# Patient Record
Sex: MALE | Race: WHITE | Employment: OTHER | URBAN - METROPOLITAN AREA
[De-identification: names, ages, dates, MRNs, and addresses within clinical notes are randomized per-mention and may not be internally consistent; named-entity substitution may affect disease eponyms.]

---

## 2019-02-04 ENCOUNTER — HOSPITAL ENCOUNTER (INPATIENT)
Age: 74
LOS: 9 days | Discharge: HOME | DRG: 057 | End: 2019-02-13
Attending: PHYSICAL MEDICINE & REHABILITATION | Admitting: PHYSICAL MEDICINE & REHABILITATION
Payer: MEDICARE

## 2019-02-04 DIAGNOSIS — R26.9 GAIT DIFFICULTY: ICD-10-CM

## 2019-02-04 DIAGNOSIS — R44.9 SENSORY DEFICIT, LEFT: ICD-10-CM

## 2019-02-04 DIAGNOSIS — G81.94 LEFT HEMIPARESIS (HCC): ICD-10-CM

## 2019-02-04 DIAGNOSIS — I63.9 CEREBROVASCULAR ACCIDENT (CVA), UNSPECIFIED MECHANISM (HCC): ICD-10-CM

## 2019-02-04 PROCEDURE — 97161 PT EVAL LOW COMPLEX 20 MIN: CPT

## 2019-02-04 PROCEDURE — 74011250637 HC RX REV CODE- 250/637: Performed by: PHYSICAL MEDICINE & REHABILITATION

## 2019-02-04 PROCEDURE — 99223 1ST HOSP IP/OBS HIGH 75: CPT | Performed by: PHYSICAL MEDICINE & REHABILITATION

## 2019-02-04 PROCEDURE — 97116 GAIT TRAINING THERAPY: CPT

## 2019-02-04 PROCEDURE — 97530 THERAPEUTIC ACTIVITIES: CPT

## 2019-02-04 PROCEDURE — 65310000000 HC RM PRIVATE REHAB

## 2019-02-04 RX ORDER — AMLODIPINE BESYLATE 5 MG/1
5 TABLET ORAL DAILY
Status: DISCONTINUED | OUTPATIENT
Start: 2019-02-05 | End: 2019-02-13 | Stop reason: HOSPADM

## 2019-02-04 RX ORDER — PREDNISONE 5 MG/1
5 TABLET ORAL DAILY
COMMUNITY
End: 2019-02-13

## 2019-02-04 RX ORDER — ASPIRIN 81 MG/1
81 TABLET ORAL DAILY
Status: DISCONTINUED | OUTPATIENT
Start: 2019-02-05 | End: 2019-02-13 | Stop reason: HOSPADM

## 2019-02-04 RX ORDER — ATORVASTATIN CALCIUM 40 MG/1
80 TABLET, FILM COATED ORAL
Status: DISCONTINUED | OUTPATIENT
Start: 2019-02-04 | End: 2019-02-13 | Stop reason: HOSPADM

## 2019-02-04 RX ORDER — IBUPROFEN 200 MG
1 TABLET ORAL DAILY
Status: DISCONTINUED | OUTPATIENT
Start: 2019-02-05 | End: 2019-02-13 | Stop reason: HOSPADM

## 2019-02-04 RX ADMIN — ATORVASTATIN CALCIUM 80 MG: 40 TABLET, FILM COATED ORAL at 21:19

## 2019-02-04 NOTE — PROGRESS NOTES
02/04/19 1315 Dual Skin Pressure Injury Assessment Dual Skin Pressure Injury Assessment WDL Second Care Provider (Based on [address] [place] [address]) [place], RN Skin Integumentary Skin Integumentary (WDL) WDL Primary Nurse [address] [place] [address], RN performed a dual skin assessment on this patient No impairment noted. Oriented to room and call light. Instructed to call for assistance. No needs stated at this time. Will continue to monitor. Eduardo score is 18.

## 2019-02-04 NOTE — PROGRESS NOTES
Hourly rounds completed this shift. Patient resting in wheelchair with wife. No needs stated at this time. Will continue to monitor and give report to oncoming RN.

## 2019-02-04 NOTE — PROGRESS NOTES
PHYSICAL THERAPY EXAMINATION 
TIME IN 32 61 16 TIME OUT 1620 Patient Name: Subhash Vasquez Patient Age: 68 y.o. Past Medical History: No past medical history on file. Medical Diagnosis:  CVA (cerebral vascular accident) ([facility] [address] [I63.9] Left hemiparesis ([facility] [address] [G81.94] Sensory deficit, left [R41.89] Gait difficulty [R26.9] <principal problem not specified> Precautions at Admission: Other (comment)(fall precautions) Therapy Diagnosis:  
Difficulty with bed mobility  [x] Difficulty with functional transfers  [x] Difficulty with ambulation  [x] Difficulty with stair negotiations  [x] Problem List:   
Decreased strength B LE  [x] Decreased strength trunk/core  [x] Decreased AROM   [x] Decreased PROM  [] Decreased endurance  [x] Decreased balance sitting  [] Decreased balance standing  [x] Pain   [] Slow ambulation velocity  [x] Decreased coordination  [x] Decreased safety awareness  [] Functional Limitations:  
Decreased independence with bed mobility  [x] Decreased independence with functional transfers  [x] Decreased independence with ambulation  [x] Decreased independence with stair negotiation  [x] Previous Functional Level: Patient reporting he was functionally independent prior to CVA. Independent with ADLs and ambulation without a device inside and outside home. Driving Home Environment: Home Environment: Private residence # Steps to Enter: 1 Rails to Enter: No 
Wheelchair Ramp: No 
One/Two Story Residence: One story Living Alone: No 
Support Systems: Spouse/Significant Other/Partner Patient Expects to be Discharged to[de-identified] Private residence Current DME Used/Available at Home: None Tub or Shower Type: Shower(shower door, no grab bars) Outcome Measures: Vital Signs: 
Patient Vitals for the past 12 hrs: 
 Temp Pulse Resp BP SpO2  
02/04/19 1522 98.2 °F (36.8 °C) 89 16 126/80 100 % 19 1315 98.7 °F (37.1 °C) 79 17 121/73 99 % Pain level:No c/o pain during treatment Pain location:NA Pain interventions:NA Patient education:PT POC and goals, Bed mobility training,transfer training, gait training, fall precautions, activity pacing, body mechanics, w/c mobility and parts management, Patient verbalizing understanding and demonstrating partial understanding of patient education. Recommend follow up education. Interdisciplinary Communication:spoke with RN regarding functional status Cognition: Orientation:A+O x 4 Communication:able to express needs verbally, able to follow multi step commands Social Interaction:cooperating, appropriate with PT, participating, motivated to improve Problem Solving:difficulty with managing body mechanics during functional mobility secondary to left hemiparesis Memory:able to recall name, , address, PMH 
 
 
 MMT Initial Asssessment Right Lower Extremity Left Lower Extremity Hip Flexion 5 4 Knee Extension 5 5 Knee Flexion 5 4 Ankle Dorsiflexion 5 4-  
0/5 No palpable muscle contraction 1/5 Palpable muscle contraction, no joint movement 2-/5 Less than full range of motion in gravity eliminated position 2/5 Able to complete full range of motion in gravity eliminated position 2+/5 Able to initiate movement against gravity 3-/5 More than half but not full range of motion against gravity 3/5 Able to complete full range of motion against gravity 3+/5 Completes full range of motion against gravity with minimal resistance 4-/5 Completes full range of motion against gravity with minimal-moderate resistance 4/5 Completes full range of motion against gravity with moderate resistance 4+/5 Completes full range of motion against gravity with moderate-maximum resistance 5/5 Completes full range of motion against gravity with maximum resistance  AROM: RLE WFL, LLE decreased 10% with impaired coordination, flexor synergy with hyperreflexi FIM SCORES Initial Assessment Bed/Chair/Wheelchair Transfers 4 Wheelchair Mobility 3 Walking [place] 3 Steps/Stairs 2 PRIMARY MODE OF LOCOMOTION: ambulation Please see IRC Interdisciplinary Eval: Coordination/Balance Section for details regarding FIM score description. BED/CHAIR/WHEELCHAIR TRANSFERS Initial Assessment Rolling Right 6 (Modified independent) Rolling Left 6 (Modified independent) Supine to Sit 5 (Supervision) Sit to Stand Contact guard assistance Sit to Supine 6 (Modified independent) Transfer Assist Score 4 Transfer Type SPT without device Comments Increased time and effort to complete bed mobility and transfers with tendency to neglect left UE during both. Decreased coordination of LLE during transfers and bed mobility Car Transfer Not tested Car Type WHEELCHAIR MOBILITY/MANAGEMENT Initial Assessment Able to Propel 150 feet(using RUE and RLE) Functional Level 3 Curbs/ramps assistance required 0 (Not tested) Wheelchair set up assistance required 3 (Moderate assistance) Wheelchair management Manages left brake, Manages right brake WALKING INDEPENDENCE Initial Assessment Assistive device Gait belt Ambulation assistance - level surface 3 (Moderate assistance) Distance 150 Feet (ft) Functional Level 3 Comments slow cont ataxic gait with fluctuating width of base of support, occasional flexion synergy noted LLE with marching type step, occasional scissoring of LLE during swing phase and occasional decreased step clearance LLE with loss of balance x 4 and min to mod assist to regain balance Ambulation assistance - unlevel surface 0 (Not tested) STEPS/STAIRS Initial Assessment Steps/Stairs ambulated 4 Rail Use Both Functional Level 2 Comments slow reciprocating pattern going up step with decreased step clearance LLE and excessive hip and knee flexion LLE, single step at a time going down steps with impaired left foot placement on step Curbs/Ramps 0 (Not tested) QUALITY INDICATOR ASSIST COMMENTS Walk 10 feet mod Suing gait belt for all gait training Walk 50 feet with 2 turns mod Walk 150 feet mod Walk 10 feet on uneven  NT To be assessed 1 step/curb min With hand rails 4 steps min With hand rails 12 steps unable Due to left hemiparesis  object min Wheel 50' w/2 turns mod Wheel 150' mod PHYSICAL THERAPY PLAN OF CARE Therapy Diagnosis:   Please see table above Order received from MD for physical therapy services and chart reviewed. Pt to be seen at least 5 times per week for at least 1.5 hours of physical therapy per day for 2 weeks. Thank you for the referral. 
 
LTGs: LTG 1. Patient independent with bed mobility in 5 days STG 2. Patient transfer sit<>stand and perform stand pivot transfer with SBA assist in 1 week LTG 2. Patient transfer sit<>stand and perform stand pivot transfer with modified independence in 2 weeks STG 3. Patient ambulate 200 feet with LRAD and CGA  in 1 week LTG 3. Patient ambulate 400  feet with LRAD and supervision assist in 2 weeks STG 4. Patient ambulate up/down 4 steps with hand rails and SBA in 1 week LTG 4. Patient ambulate up/down 12 steps with hand rails and supervision assist in 2 weeks STG 5. Patient ambulate up/down 6 inch step with LRAD and min assist in 1 week LTG 5. Patient ambulate up/down 6 inch step with LRAD and SBA in 2 weeks Pt would benefit from skilled physical therapy in order to improve independent functional mobility within the home. Interventions may include range of motion (AROM, PROM B LE/trunk), motor function (B LE/trunk strengthening/coordination), activity tolerance (vitals, oxygen saturation levels), bed mobility training, balance activities, gait training (progressive ambulation program), and functional transfer training. Please see IRC; Interdisciplinary Eval, Care Plan, and Patient Education for further information regarding physical therapy examination and plan of care. Patient returned to room at end of treatment and remained up in recliner with LEs elevated and with needs in reach. Iliana Abraham, PT 
2/4/2019

## 2019-02-04 NOTE — H&P
HISTORY AND PHYSICAL C Admit Date: 2/4/2019 Primary Care Physician: Unknown, Provider Specialty Group: PMR Chief Complaint : Gait dysfunction secondary to below. Admit Diagnosis: CVA (cerebral vascular accident) ([facility] [address] [I63.9]; Left hemiparesis ([facility] [address] [G81.94]; Sensory deficit, left [R41.89]; Gait difficulty [R26.9] CVA 
left  hemiparesis Weakness Hypertension 
hypercholesterolemia Pain DVT risk Acute Rehab Dx: Left hemiparesis 
left steve sensory deficit 
ataxia Gait dysfunction Mobility and ambulation deficits Self Care/ADL deficits Date of Evaluation:  February 4, 2019 HPI: Edna Botello is a 68 y.o. male patient at [address] [place] [address] Road who was admitted on 2/4/2019  by Saida Guillermo MD with below mentioned medical history ,is being seen for Physical Medicine and Rehabilitation. Patient presented to [facility] January 28, 2019 with acute onset left-sided weakness difficulty standing. You also complained of mild numbness to his left side. He had no head injury fever and chills shortness of breath chest pain dysuria. He did not have any vision changes alterations in speech, Voice, or swallowing. Patient has history of hypertension hypercholesterolemia, Wegeners granulomatosis. Head CT was negative for acute hemorrhage. Tell her neurologist recommended TPA and was given in the ER. Patient was admitted And manage for acute CVA and secondary prevention. MRI confirmed a small acute early subacute infarction in the right thalamus. CTA showed moderate to significant stenosis noted in the right internal carotid siphon. Diseases also noted in the V segment of the right vertebral artery with occlusive to near occlusive narrowing. Physical therapy was initiated there and patient was starting to mobilize. However, Patient shows significant functional deficits due to left sided weakness and sensory deficit.   
Our service was consulted for rehab needs and we recommended inpatient hospital rehabilitation is reasonable and necessary due to ongoing acute medical issues which have not changed since initial pre-admission evaluation. and rehab needs still likely best managed in IRU setting. The patient was evaluated by [facility] admissions coordinators. I reviewed the preadmission screening and have approved for admission to the [facility]. The patient was cleared for transfer to rehab today. Patient continues to have ongoing  medical issues outlined above requiring physician medical supervision and functional deficits which would benefit from continued intensive therapies. Current Level of Function: 
Patient has mild left sided weakness, but more signifcantly poor control shania the left side. This is major barreir that needs to be addresses per therapy.  
  (evaluated by acute therapy staff, with bed mobility, transfers, balance personally observed post-admission in the IRF setting minutes prior to submission of document) bathing - mod A, lower body dressing - max A, toileting - max A, bed mobility - CGA, transfers- min A, poor balance , ambulation- short distances with RW , mod assist. Patient has slow cont ataxic gait with fluctuating width of base of support, occasional flexion synergy noted LLE with marching type step, occasional scissoring of LLE during swing phase and occasional decreased step clearance LLE with loss of balance x 4 and min to mod assist to regain balance. Prior Level of Function/Home Situation:  
 , lives with spouse in a two story home with 1 step to enter. atient normally ambulated with cane at modified independent level, without any other debilities. Allergies Allergen Reactions  Penicillins Not Reported This Time No family history on file. Social History Tobacco Use  Smoking status: Not on file Substance Use Topics  Alcohol use: Not on file Current Facility-Administered Medications Medication Dose Route Frequency  [START ON 2/5/2019] amLODIPine (NORVASC) tablet 5 mg  5 mg Oral DAILY  [START ON 2/5/2019] aspirin delayed-release tablet 81 mg  81 mg Oral DAILY  atorvastatin (LIPITOR) tablet 80 mg  80 mg Oral QHS  [START ON 2/5/2019] nicotine (NICODERM CQ) 21 mg/24 hr patch 1 Patch  1 Patch TransDERmal DAILY Review of Systems: 
Denies: fevers, chills, sweats, fatigue, malaise, anorexia, weight loss Denies: blurry vision, loss of vision, eye pain, photophobia Denies: hearing loss, ringing in the ears, earache, epistaxis Denies: chest pain, palpitations, syncope, orthopnea, paroxysmal nocturnal dyspnea, claudication Denies: dysphagia, odynophagia, nausea, vomiting, diarrhea, constipation, abdominal pain, jaundice, melena Denies: frequency, dysuria, nocturia, urinary incontinence, stones, hematuria Denies: polydipsia/polyuria, skin changes, temperature intolerance, unexpected weight gain Denies: back pain, joint pain, joint swelling, muscle pain Denies: bleeding problems, blood transfusions, bruising, pallor, swollen lymph nodes Denies: headache, dysarthria, blurred vision, diplopia,seizure Objective:  
 
Visit Vitals /73 Pulse 79 Temp 98.7 °F (37.1 °C) Resp 17 SpO2 99% Intake and Output: 
No intake/output data recorded. Physical Exam: Psych: Patient was oriented to person, place, and time. Without hallucinations, abnormal affect or abnormal behaviors during the examination. Patient is not suicidal.  
General:   Alert, appears stated age, No acute distress. HEENT:  Normocephalic, EOMI, facial symmetry  Intact. Oral mucosa pink and moist. No nasal discharge. Lungs:  CTA Bilaterally,Respiration even and unlabored No apparent use of accessory muscles for respiration. No nasal alar flaring. Heart:  Regular rate and rhythm, S1, S2, No obvious murmur, click, rub or gallop. Genitourinary: defered Abdomen:  Soft, non-tender to palpation in all four quadrants. Bowel sounds present. No obvious masses palpated. Neuromuscular:  PERRL, EOMI 
LUE     Shoulder abduction   4/5 Elbow flexion:  4 /5 Wrist extension:  4 /5 Finger flexion;  4 /5 
 ADMQ:   4/5 RUE    Shoulder abduction:  5/5 Elbow flexion:  5 /5 Wrist extension: 5 /5 Finger flexion:   5/5 
 ADMQ:  5 /5 LLE     Hip flexion:   4/5 Knee extension:  4 /5 Ankle dorsiflexion: 4  /5 Ankle plantarflexion:  4 /5       
RLE     Hip flexion:  5 /5 Knee extension:   5/5 Ankle dorsiflexion: 5  /5 Ankle plantarflexion:  5 /5 Sensory - decreased sensation to soft touch, pin prock, proprioception Left side. LUE>LLE limb ataxia. Skin:  Intact, dry, good skin turgor, age related changes present Edema: none Lab Review:  No results found for this or any previous visit (from the past 72 hour(s)). Condition on Admission: Good Assessment: The Post Assessment Physician Evaluation (SOHAN) found the current functional status to be comparable with the Pre-admission Screening. The Patient is a good candidate for acute inpatient rehabilitation. Nothing since the Pre-admission screen has changed that determination. Rehabilitation Plan The patient has shown the ability to tolerate and benefit from 3 hours of therapy daily and is being admitted to a comprehensive acute inpatient rehabilitation program consisting of at least 3 hours of combined physical and occupational therapies. Begin intensive Physical Therapy for a minimum of 1.5 hours a day, at least 5 out of 7 days per week to address bed mobility, transfers, ambulation, strengthening, balance, and endurance. Begin intensive Occupational Therapy for a minimum of 1.5 hours a day, at least 5 out of 7 days per week to address ADL ( bathing, LE dressing, toileting) and adaptive equipment as needed. - focus on left sided weakness, control deficit. The patient will also require 24-hour skilled rehabilitation nursing for bowel and bladder management, skin care for decubitus ulcer prevention , pain management and ongoing medication administration The patient may benefit from a psychology consult for depression, adjustment disorder. Continue ST for: dysarthria, impaired communication skills. Given the patient's complex neurologic/medical condition and the risk of further medical complications including: DVT, PE, skin breakdown, pneumonia due to decreased mobility, recurrent CVA, progression of CVA. MI, cardiac arrhythmias due to HTN, increased risk of thromboembolism could potentially impact the IRF program . For these ongoing medical issues, rehabilitation services could not be safely provided at a lower level of care such as a skilled nursing facility or nursing home. Patient has shown the ability to tolerate and benefit from 3 hours of therapy daily and is being admitted to a comprehensive acute inpatient rehabilitation program consisting of at least 3 hours of combined physical and occupational therapies. planning intensive Physical Therapy for a minimum of 1.5 hours a day, at least 5 out of 7 days per week to address bed mobility, transfers, ambulation, strengthening, balance, and endurance. Planning intensive Occupational Therapy for a minimum of 1.5 hours a day, at least 5 out of 7 days per week to address ADL ( bathing, LE dressing, toileting) and adaptive equipment as needed. 
  
Patient was independent at baseline with mobility and ADLs.   
Expect patient will improve to mod I level with mobility and gairt as pain and RLE strength improve. [id number] length of stay and patient is expected to return home with wife and continued rehabilitation with home health therapy, out pt PT. Continue daily physician medical management: CVA  - small acute early subacute infarction in the right thalamus. CTA  
- left  Hemiparesis 
- continue secondary prevention management. - aspirin, statin BP  Control, risk factor reduction. Hypertension - BP uncontrolled, fluctuating, managed medically. - norvasc 5 daily Hypercholesterolemia - lipitor 80 Smoker - continue nicoderm. - counceling. Pneumonia prophylaxis- Insentive spirometer every hour while awake DVT risk / DVT Prophylaxis- Will require daily physician exam to assess for signs and symptoms as patient is at increased risk for of thromboembolism. Mobilization as tolerated. Intermittent pneumatic compression devices when in bed Thigh-high or knee-high thromboembolic deterrent hose when out of bed. Potential urinary retention/ neurogenic bladder - schedule voids q6-8 hrs. Check post-void residual every shift; In and Out catheterize if post-void residual is more than 400 cc. 
 
bowel program - monitor.  
  
 
 
Signed By: Guerda Saleh MD   
 February 4, 2019

## 2019-02-05 LAB
ANION GAP SERPL CALC-SCNC: 7 MMOL/L (ref 7–16)
BASOPHILS # BLD: 0.1 K/UL (ref 0–0.2)
BASOPHILS NFR BLD: 1 % (ref 0–2)
BUN SERPL-MCNC: 13 MG/DL (ref 8–23)
CALCIUM SERPL-MCNC: 8.4 MG/DL (ref 8.3–10.4)
CHLORIDE SERPL-SCNC: 109 MMOL/L (ref 98–107)
CO2 SERPL-SCNC: 25 MMOL/L (ref 21–32)
CREAT SERPL-MCNC: 0.63 MG/DL (ref 0.8–1.5)
DIFFERENTIAL METHOD BLD: ABNORMAL
EOSINOPHIL # BLD: 0.3 K/UL (ref 0–0.8)
EOSINOPHIL NFR BLD: 5 % (ref 0.5–7.8)
ERYTHROCYTE [DISTWIDTH] IN BLOOD BY AUTOMATED COUNT: 15.9 % (ref 11.9–14.6)
GLUCOSE SERPL-MCNC: 86 MG/DL (ref 65–100)
HCT VFR BLD AUTO: 40.3 % (ref 41.1–50.3)
HGB BLD-MCNC: 12.4 G/DL (ref 13.6–17.2)
IMM GRANULOCYTES # BLD AUTO: 0 K/UL (ref 0–0.5)
IMM GRANULOCYTES NFR BLD AUTO: 0 % (ref 0–5)
LYMPHOCYTES # BLD: 1.8 K/UL (ref 0.5–4.6)
LYMPHOCYTES NFR BLD: 31 % (ref 13–44)
MAGNESIUM SERPL-MCNC: 2.1 MG/DL (ref 1.8–2.4)
MCH RBC QN AUTO: 26.8 PG (ref 26.1–32.9)
MCHC RBC AUTO-ENTMCNC: 30.8 G/DL (ref 31.4–35)
MCV RBC AUTO: 87 FL (ref 79.6–97.8)
MONOCYTES # BLD: 0.6 K/UL (ref 0.1–1.3)
MONOCYTES NFR BLD: 11 % (ref 4–12)
NEUTS SEG # BLD: 2.9 K/UL (ref 1.7–8.2)
NEUTS SEG NFR BLD: 51 % (ref 43–78)
NRBC # BLD: 0 K/UL (ref 0–0.2)
PLATELET # BLD AUTO: 230 K/UL (ref 150–450)
PMV BLD AUTO: 11.8 FL (ref 9.4–12.3)
POTASSIUM SERPL-SCNC: 3.6 MMOL/L (ref 3.5–5.1)
RBC # BLD AUTO: 4.63 M/UL (ref 4.23–5.6)
SODIUM SERPL-SCNC: 141 MMOL/L (ref 136–145)
WBC # BLD AUTO: 5.7 K/UL (ref 4.3–11.1)

## 2019-02-05 PROCEDURE — 65310000000 HC RM PRIVATE REHAB

## 2019-02-05 PROCEDURE — 97110 THERAPEUTIC EXERCISES: CPT

## 2019-02-05 PROCEDURE — 97530 THERAPEUTIC ACTIVITIES: CPT

## 2019-02-05 PROCEDURE — 97165 OT EVAL LOW COMPLEX 30 MIN: CPT

## 2019-02-05 PROCEDURE — 97116 GAIT TRAINING THERAPY: CPT

## 2019-02-05 PROCEDURE — 83735 ASSAY OF MAGNESIUM: CPT

## 2019-02-05 PROCEDURE — 92523 SPEECH SOUND LANG COMPREHEN: CPT

## 2019-02-05 PROCEDURE — 97112 NEUROMUSCULAR REEDUCATION: CPT

## 2019-02-05 PROCEDURE — 80048 BASIC METABOLIC PNL TOTAL CA: CPT

## 2019-02-05 PROCEDURE — 85025 COMPLETE CBC W/AUTO DIFF WBC: CPT

## 2019-02-05 PROCEDURE — 96150 PR HEAL & BEHAV ASSESS,EA 15 MIN,INIT: CPT | Performed by: PSYCHOLOGIST

## 2019-02-05 PROCEDURE — 36415 COLL VENOUS BLD VENIPUNCTURE: CPT

## 2019-02-05 PROCEDURE — 74011250637 HC RX REV CODE- 250/637: Performed by: PHYSICAL MEDICINE & REHABILITATION

## 2019-02-05 PROCEDURE — 99232 SBSQ HOSP IP/OBS MODERATE 35: CPT | Performed by: PHYSICAL MEDICINE & REHABILITATION

## 2019-02-05 PROCEDURE — 97535 SELF CARE MNGMENT TRAINING: CPT

## 2019-02-05 RX ADMIN — ATORVASTATIN CALCIUM 80 MG: 40 TABLET, FILM COATED ORAL at 21:16

## 2019-02-05 RX ADMIN — ASPIRIN 81 MG: 81 TABLET, COATED ORAL at 08:42

## 2019-02-05 RX ADMIN — AMLODIPINE BESYLATE 5 MG: 5 TABLET ORAL at 08:42

## 2019-02-05 NOTE — PROGRESS NOTES
Problem: Falls - Risk of 
Goal: *Absence of Falls Document Francesca Hallman Fall Risk and appropriate interventions in the flowsheet. Outcome: Progressing Towards Goal 
Fall Risk Interventions: 
Mobility Interventions: Utilize walker, cane, or other assistive device Medication Interventions: Patient to call before getting OOB Elimination Interventions: Bed/chair exit alarm, Call light in reach Problem: Pressure Injury - Risk of 
Goal: *Prevention of pressure injury Document Eduardo Scale and appropriate interventions in the flowsheet. Pressure Injury Interventions: 
Sensory Interventions: Assess changes in LOC Moisture Interventions: Apply protective barrier, creams and emollients Activity Interventions: Increase time out of bed Mobility Interventions: HOB 30 degrees or less Nutrition Interventions: Document food/fluid/supplement intake Friction and Shear Interventions: HOB 30 degrees or less

## 2019-02-05 NOTE — PROGRESS NOTES
OT Daily Note Time In 1300 Time Out 1353 Precautions: L Joesph; falls Home: one story house with spouse; walk-in shower Left Right Modified Box and Blocks 2/5/19 18 blocks/min 35 blocks/min  
 
9 Hole Peg Test Left Right  
2/5/19 101.72 seconds 37.65 seconds Pain: Patient had no complaint of pain. Functional Mobility Pt managed WB breaks with I. Pt ambulated 5 ft from [facility] to recliner with mod A for steadying after LOB. Neuro-Muscular Re-Education Pt was educated about the following exercises to address LUE function: 
 
Exercise Sets Reps Scapular Elevation/Depression 2 10 Protraction/Retraction 2 10 Abduction 2 10 Pt demonstrated good performance with these exercises and needed cues to take rest breaks. Neuro-Muscular Re-Education Pt completed prolonged shoulder stabilization task to promote UB strength and activity tolerance for integration into ADL. Pt used LUE to move 15 objects laterally and upward at multiple heights. Pt responded to verbal cues to take a rest break then repeated activity moving objects laterally and downward. Pt demonstrated good [address] and appropriate pincer grasp to accurately manipulate objects. The quality of pt's performance decreased as activity progressed and he became more fatigued. Education CIMT, safety awareness, energy conservation, expectations for recovery Interdisciplinary Communication: Team conference about POC. Plan: Continue with POC. Pt was left in recliner with all needs within reach. Sofia Zarate OTS 
2/5/2019

## 2019-02-05 NOTE — PROGRESS NOTES
Interdisciplinary Conference Notes Interdisciplinary conference completed to discuss plan of care with: Rafaela Lubin MD, Joaquin Gayle RN, Vamsi Alexander PT, Chanell Mazariegos PTA, Julia Blackburn OTR/L, Carlos Martinez OTR/L, Justin Moran CCC-SLP and Cristiana ESPINOZA Estimated D/C Date: 02/13/2019 Recommended Equipment:  
 
Recommended Follow-Up Therapy:    
 
Communication with family/caregivers:

## 2019-02-05 NOTE — PROGRESS NOTES
Met with patient,  [email or web address] answers all questions appropriately. Lives with his wife Kyle Araujo ([phone]) in a house single level with 1 CHANDANA, and walk in shower. Patient had a CVA with Left Hemiparesis and Left Sensory deficits. Patient was performing ADL's independently prior to admission, will be discharged with wife at home is the plan for the patient. No living will. Patient saw  2 months ago. [place]:  [place], does not utilize [employer] DME:  None Care Management Interventions PCP Verified by CM: Yes() Last Visit to PCP: 12/05/18 Mode of Transport at Discharge: Self Transition of Care Consult (CM Consult): Discharge Planning Physical Therapy Consult: Yes Occupational Therapy Consult: Yes Speech Therapy Consult: Yes Current Support Network: Lives with Spouse, Own Home(Lives in own home with wife Kyle Araujo) Confirm Follow Up Transport: Family Plan discussed with Pt/Family/Caregiver: Yes Freedom of Choice Offered: Yes [employer] Information Provided?: (Patient is a [place], does not utilize the [place] benefits) Discharge Location Discharge Placement: Unable to determine at this time(Patient discharge plan is to go home with wife)

## 2019-02-05 NOTE — PROGRESS NOTES
SFD PROGRESS NOTE Yadira Cha Admit Date: 2/4/2019 Admit Diagnosis: CVA (cerebral vascular accident) ([facility] [address] [I63.9]; Left hemiparesis ([facility] [address] [G81.94]; Sensory deficit, left [R41.89]; Gait difficulty [R26.9] Subjective Patient seen and examined. Vss. No acute complaints. PT, OT well tolerated. Steady gains made. No new barrier to progress noted. Objective:  
 
Current Facility-Administered Medications Medication Dose Route Frequency  amLODIPine (NORVASC) tablet 5 mg  5 mg Oral DAILY  aspirin delayed-release tablet 81 mg  81 mg Oral DAILY  atorvastatin (LIPITOR) tablet 80 mg  80 mg Oral QHS  nicotine (NICODERM CQ) 21 mg/24 hr patch 1 Patch  1 Patch TransDERmal DAILY Review of Systems:Denies chest pain, shortness of breath, cough, headache, visual problems, abdominal pain, dysurea, calf pain. Pertinent positives are as noted in the medical records and unremarkable otherwise. Visit Vitals /78 Pulse 78 Temp 97.8 °F (36.6 °C) Resp 18 SpO2 97% Physical Exam: Psych: Patient was oriented to person, place, and time. Without hallucinations, abnormal affect or abnormal behaviors during the examination. Patient is not suicidal.  
General:   Alert, appears stated age, No acute distress. HEENT:  Normocephalic, EOMI, facial symmetry  Intact. No JVD sitting. Lungs:  CTA Bilaterally,Respiration even and unlabored No apparent use of accessory muscles for respiration. No nasal alar flaring. Heart:  Regular rate and rhythm, S1, S2, No obvious murmur Genitourinary: defered Abdomen:  Soft, non-tender to palpation in all four quadrants. Bowel sounds present. Neuromuscular:  PERRL, EOMI 
LUE     Shoulder abduction   4/5 Elbow flexion:  4 /5 Wrist extension:  4 /5 Finger flexion;  4 /5 
            ADMQ:   4/5 RUE    Shoulder abduction:  5/5 Elbow flexion:  5 /5             Wrist extension: 5 /5 
 Finger flexion:   5/5 
            ADMQ:  5 /5 LLE     Hip flexion:   4/5 Knee extension:  4 /5 Ankle dorsiflexion: 4  /5 Ankle plantarflexion:  4 /5                                    
RLE     Hip flexion:  5 /5 Knee extension:   5/5 Ankle dorsiflexion: 5  /5 Ankle plantarflexion:  5 /5 Sensory - decreased sensation to soft touch, pin prock, proprioception Left side. LUE>LLE limb ataxia.  
   
Skin:  Intact, dry, good skin turgor, age related changes present Edema: none Functional Assessment: 
   
   
Balance Sitting - Static: Good (unsupported) (02/05/19 1000) Sitting - Dynamic: Fair (occasional) (02/05/19 1000) Standing - Static: Good (02/05/19 1000) Francheska Talbert Fall Risk Assessment: 
David Stanley Risk Mobility: Ambulates or transfers with assist devices or assistance (02/06/19 0720) Mobility Interventions: Communicate number of staff needed for ambulation/transfer;OT consult for ADLs; Patient to call before getting OOB;PT Consult for mobility concerns;PT Consult for assist device competence;Strengthening exercises (ROM-active/passive); Utilize walker, cane, or other assistive device (02/05/19 2205) Mentation: Alert, oriented x 3 (02/06/19 0720) Medication: Patient receiving anticonvulsants, sedatives(tranquilizers), psychotropics or hypnotics, hypoglycemics, narcotics, sleep aids, antihypertensives, laxatives, or diuretics (02/06/19 0720) Medication Interventions: Assess postural VS orthostatic hypotension; Evaluate medications/consider consulting pharmacy; Patient to call before getting OOB; Teach patient to arise slowly (02/05/19 2205) Elimination: Needs assistance with toileting (02/06/19 0720) Elimination Interventions: Call light in reach; Patient to call for help with toileting needs;Urinal in reach (02/05/19 2205) Prior Fall History: No (02/06/19 0720) Total Score: 3 (02/06/19 0720) Standard Fall Precautions: Yes (02/06/19 0720) High Fall Risk: Yes (02/06/19 0720) Speech Assessment: 
    
 
Ambulation: 
Gait Distance (ft): 100 Feet (ft) (02/05/19 1646) Assistive Device: Gait belt;Cane, straight (02/05/19 1646) Rail Use: Both (02/04/19 1700) Labs/Studies: 
Recent Results (from the past 72 hour(s)) CBC WITH AUTOMATED DIFF Collection Time: 02/05/19  5:42 AM  
Result Value Ref Range WBC 5.7 4.3 - 11.1 K/uL  
 RBC 4.63 4.23 - 5.6 M/uL  
 HGB 12.4 (L) 13.6 - 17.2 g/dL HCT 40.3 (L) 41.1 - 50.3 % MCV 87.0 79.6 - 97.8 FL  
 MCH 26.8 26.1 - 32.9 PG  
 MCHC 30.8 (L) 31.4 - 35.0 g/dL  
 RDW 15.9 (H) 11.9 - 14.6 % PLATELET 085 686 - 278 K/uL MPV 11.8 9.4 - 12.3 FL ABSOLUTE NRBC 0.00 0.0 - 0.2 K/uL  
 DF AUTOMATED NEUTROPHILS 51 43 - 78 % LYMPHOCYTES 31 13 - 44 % MONOCYTES 11 4.0 - 12.0 % EOSINOPHILS 5 0.5 - 7.8 % BASOPHILS 1 0.0 - 2.0 % IMMATURE GRANULOCYTES 0 0.0 - 5.0 %  
 ABS. NEUTROPHILS 2.9 1.7 - 8.2 K/UL  
 ABS. LYMPHOCYTES 1.8 0.5 - 4.6 K/UL  
 ABS. MONOCYTES 0.6 0.1 - 1.3 K/UL  
 ABS. EOSINOPHILS 0.3 0.0 - 0.8 K/UL  
 ABS. BASOPHILS 0.1 0.0 - 0.2 K/UL  
 ABS. IMM. GRANS. 0.0 0.0 - 0.5 K/UL METABOLIC PANEL, BASIC Collection Time: 02/05/19  5:42 AM  
Result Value Ref Range Sodium 141 136 - 145 mmol/L Potassium 3.6 3.5 - 5.1 mmol/L Chloride 109 (H) 98 - 107 mmol/L  
 CO2 25 21 - 32 mmol/L Anion gap 7 7 - 16 mmol/L Glucose 86 65 - 100 mg/dL BUN 13 8 - 23 MG/DL Creatinine 0.63 (L) 0.8 - 1.5 MG/DL  
 GFR est AA >60 >60 ml/min/1.73m2 GFR est non-AA >60 >60 ml/min/1.73m2 Calcium 8.4 8.3 - 10.4 MG/DL MAGNESIUM Collection Time: 02/05/19  5:42 AM  
Result Value Ref Range Magnesium 2.1 1.8 - 2.4 mg/dL Assessment:  
 
Problem List as of 2/6/2019 Never Reviewed Codes Class Noted - Resolved Gait difficulty ICD-10-CM: R26.9 ICD-9-CM: 781.2  2/4/2019 - Present CVA (cerebral vascular accident) [facility]) ICD-10-CM: I63.9 ICD-9-CM: 434.91  2/4/2019 - Present Left hemiparesis ([address] ICD-10-CM: G81.94 
ICD-9-CM: 342.90  2/4/2019 - Present Sensory deficit, left ICD-10-CM: R41.89 ICD-9-CM: 780.99  2/4/2019 - Present Assessment/plan:  
 
Continue intensive Physical Therapy for a minimum of 1.5 hours a day, at least 5 out of 7 days per week to address bed mobility, transfers, ambulation, strengthening, balance, and endurance. Begin intensive Occupational Therapy for a minimum of 1.5 hours a day, at least 5 out of 7 days per week to address ADL ( bathing, LE dressing, toileting) and adaptive equipment as needed. - focus on left sided weakness, control deficit.  
  
The patient will also require 24-hour skilled rehabilitation nursing for bowel and bladder management, skin care for decubitus ulcer prevention , pain management and ongoing medication administration Continue daily physician medical management: CVA  - small acute early subacute infarction in the right thalamus. CTA  
- left  Hemiparesis 
- continue secondary prevention management. - aspirin, statin BP  Control, risk factor reduction. - 2/5 LUE motor strength is fairly good, however he has poor control of his arm, more distally. PT using DF assist to improve RLE clearance, gait pattern.  
  
  
Hypertension - - BP fairly controlled. No new changes. Continue norvasc 5 daily 
  
Hypercholesterolemia - lipitor 80 
  
Smoker - continue nicoderm. - counceling.  
  
Pneumonia prophylaxis- Insentive spirometer every hour while awake 
  
DVT risk / DVT Prophylaxis- Will require daily physician exam to assess for signs and symptoms as patient is at increased risk for of thromboembolism. Mobilization as tolerated.  Intermittent pneumatic compression devices when in bed Thigh-high or knee-high thromboembolic deterrent hose when out of bed.   
- mobility is good. contiue SCDs,  
  
Potential urinary retention/ neurogenic bladder - schedule voids q6-8 hrs. Check post-void residual every shift; In and Out catheterize if post-void residual is more than 400 cc. 
  
bowel program - monitor.  
  
 
 
Time spent was 25 minutes with over 1/2 in direct patient care/examination, consultation and coordination of care. Signed By: Magy Heck MD   
 February 6, 2019

## 2019-02-05 NOTE — PROGRESS NOTES
Subjective \"I want to work as hard as I can to get back to normal.\"  
Activity Evaluation Strength/Endurance Patient with decreased coordination and strength with his LUE. .  
Balance Transfer assist:  Min (A) with RW  
Social Interaction Patient was friendly and conversational with this therapist during the session. Cognitive A&O X4 Comments Patient was assisted to his room and into the recliner. He was left with his lunch tray and all needs within reach. Patient needed a reminder to ask for help when getting up. He stood up and headed over to his recliner chair without this therapist by his side. He apologized. Patient appears motivated and willing to participate with recreational therapy. Patient's Recreational Therapy evaluation completed under the [facility] navigation tool on 2/5/19. Please see for specifics regarding plan of care and goals.  Patient prefers to be called \"Bryon.\" Thank you for the referral. 
Chey Us, CTRS

## 2019-02-05 NOTE — PROGRESS NOTES
Problem: Falls - Risk of 
Goal: *Absence of Falls Document Whitney Rank Fall Risk and appropriate interventions in the flowsheet. Outcome: Progressing Towards Goal 
Fall Risk Interventions: 
Mobility Interventions: Bed/chair exit alarm, Patient to call before getting OOB, Utilize walker, cane, or other assistive device Medication Interventions: Patient to call before getting OOB, Teach patient to arise slowly Elimination Interventions: Call light in reach, Patient to call for help with toileting needs, Toileting schedule/hourly rounds, Urinal in reach Problem: Pressure Injury - Risk of 
Goal: *Prevention of pressure injury Document Eduardo Scale and appropriate interventions in the flowsheet. Outcome: Progressing Towards Goal 
Pressure Injury Interventions: 
Sensory Interventions: Assess changes in LOC Moisture Interventions: Absorbent underpads Activity Interventions: Increase time out of bed, Chair cushion, Pressure redistribution bed/mattress(bed type) Mobility Interventions: Chair cushion, Pressure redistribution bed/mattress (bed type), HOB 30 degrees or less Nutrition Interventions: Offer support with meals,snacks and hydration Friction and Shear Interventions: Lift sheet

## 2019-02-05 NOTE — PROGRESS NOTES
PHYSICAL THERAPY DAILY NOTE Time In: 1519 Time Out: 4843 Patient Seen For: PM;Other (see progress notes); Transfer training;Gait training; Therapeutic exercise Subjective: \" I think I'm doing good now. \" 
 
  
 
Objective: Discussed with patient concerning his driving. He has asked multiple times about driving . I reported that would be up to his physician but it would be awhile. Other (comment)(fall precautions) GROSS ASSESSMENT Daily Assessment COGNITION Daily Assessment BED/MAT MOBILITY Daily Assessment Supine to Sit : 7 (Independent) Sit to Supine : 7 (Independent) TRANSFERS Daily Assessment Transfer Type: SPT without device Transfer Assistance : 4 (Minimal assistance) Sit to Stand Assistance: Contact guard assistance Car Transfers: Not tested GAIT Daily Assessment Left ankle turning at times with gait. Wrapped with ace wrap for stability. Amount of Assistance: 4 (Minimal assistance)(with ace wrap for df assist on left) Distance (ft): 100 Feet (ft) Assistive Device: Gait belt;Cane, straight STEPS or STAIRS Daily Assessment Level of Assist : 0 (Not tested) BALANCE Daily Assessment Sitting - Static: Good (unsupported) Sitting - Dynamic: Fair (occasional) Standing - Static: Good WHEELCHAIR MOBILITY Daily Assessment LOWER EXTREMITY EXERCISES Daily Assessment Extremity: Both Exercise Type #1: Other (comment)(standing  exs moving LE and UEs at the same time) Sets Performed: 5 Reps Performed: 0 Level of Assist: Moderate assistance Assessment: Patient making progress but still requires assistance for mobility. Left ankle turning at times with gait. Plan of Care: Continue with plan of care. Will start with quadriped exs in AM for balance. Kinsey Billings, PTA 
2/5/2019

## 2019-02-05 NOTE — PROGRESS NOTES
PHYSICAL THERAPY DAILY NOTE Time In: 7937 Time Out: 1118 Patient Seen For: AM;Other (see progress notes); Transfer training;Gait training Subjective: Patient had no complaints. Objective: No pain noted. Other (comment)(fall precautions) GROSS ASSESSMENT Daily Assessment COGNITION Daily Assessment BED/MAT MOBILITY Daily Assessment Supine to Sit : 7 (Independent) Sit to Supine : 7 (Independent) TRANSFERS Daily Assessment Transfer Type: SPT without device Transfer Assistance : 4 (Minimal assistance) Sit to Stand Assistance: Contact guard assistance Car Transfers: Not tested GAIT Daily Assessment Amount of Assistance: 4 (Minimal assistance)(used ace wrap for DF on left) Distance (ft): 100 Feet (ft) Assistive Device: Gait belt; Other (comment)(no device) STEPS or STAIRS Daily Assessment Level of Assist : 0 (Not tested) BALANCE Daily Assessment Sitting - Static: Good (unsupported) Sitting - Dynamic: Fair (occasional) Standing - Static: Good WHEELCHAIR MOBILITY Daily Assessment LOWER EXTREMITY EXERCISES Daily Assessment Extremity: Both Exercise Type #1: Other (comment)(nustep x 10 minutes) Sets Performed: 1 Reps Performed: 10 Level of Assist: Supervision Assessment: Patient making great progress and seems to understand his deficits. Plan of Care: Continue with plan of care to reach PT goals. To recreational therapy after treatment. Jozef Ndiaye, PTA 
2/5/2019

## 2019-02-05 NOTE — PROGRESS NOTES
Attempted to call wife Ashvin Kohler without success, unable to verify it was her phone. Left no message. Spoke with the patient aware of possible discharge date of 02/13/2019. Verbalized understanding.

## 2019-02-05 NOTE — PROGRESS NOTES
02/05/19 1002 Time Spent With Patient Time In 6646 Time Out 1000 Mental Status Neurologic State Alert Orientation Level Oriented X4 Cognition Follows commands; Appropriate for age attention/concentration Perception Appears intact Perseveration No perseveration noted Safety/Judgement Awareness of environment Psychosocial  
Patient Behaviors Calm; Cooperative Purposeful Interaction Yes Reading Comprehension Visual Impairment Glasses/contacts Pre-Morbid Reading Status Literate Scanning/Tracking  No impairment Sentence No Impairment Written Expression Pre-Morbid Dominant Hand Right Legibility  No impairment Formulation No impairment 02/05/19 1003 Verbal Expression Primary Mode of Expression Verbal  
Conversation No impairment FIM Score (Verbal Expression) 7 Oral-Motor Structure/Motor Speech Intelligibility No impairment Neuro-Linguistics/Cognitive Function FIM Score (Problem Solving) 6 FIM Score (Memory) 6 Pragmatics FIM Score (Pragmatics/Social Interaction) 7 Patient participated with cognitive assessment. Scored 27/30 on Abiel Cognitive Assessment placing him within normal limits. Patient recalls recent events in detail, names of staff he has worked with, and daily routines. Attests to no changes in cognitive function. Reports minimal long-term changes in short-term memory since \"retiring and getting older\". Patient was an  at [employer] and retired 9 years ago. He lives at home with his wife and is independent. Tolerating regular textures/thin liquids. No further ST appears indicated at this time.  
 
 
Ham Gastelum MS, CCC-SLP

## 2019-02-05 NOTE — PROGRESS NOTES
[facility] OCCUPATIONAL THERAPY INITIAL EVALUATION Time In: 3107 Time Out: 3222 Precautions: Falls Pain: Patient had no complaint of pain. History of Presenting Illness (per previous reports): HPI: Chris Zamudio is a 68 y.o. male patient at [address] [place] [address] Road who was admitted on 2/4/2019  by Scott Mccall MD with below mentioned medical history ,is being seen for Physical Medicine and Rehabilitation.   
  
Patient presented to [facility] January 28, 2019 with acute onset left-sided weakness difficulty standing. You also complained of mild numbness to his left side. He had no head injury fever and chills shortness of breath chest pain dysuria. He did not have any vision changes alterations in speech, Voice, or swallowing. Patient has history of hypertension hypercholesterolemia, Wegeners granulomatosis. Head CT was negative for acute hemorrhage. Tell her neurologist recommended TPA and was given in the ER. Patient was admitted And manage for acute CVA and secondary prevention. MRI confirmed a small acute early subacute infarction in the right thalamus. CTA showed moderate to significant stenosis noted in the right internal carotid siphon. Diseases also noted in the V segment of the right vertebral artery with occlusive to near occlusive narrowing. Past Medical History/ Co-morbidities: No past medical history on file. Patient's Goal: To get back to where I was Previous Level of Function: I with ADL and self care; Driving Home Situation Environment House Situation Private residence Lives Alone Yes Support Systems Spouse/Significant Other/Partner Shower Situation Shower Current DME None Stairs to Enter 1 W/C Ramp No  
Functional Mobility Performance Sitting: Static Good (unsupported) Sitting: Dynamic Fair (occasional) Standing: Static Good Standing: Dynamic Poor (constant support) Supine to Sit 5 (Supervision) Sit to Stand Assistance Contact guard assistance Transfer Assist Score 4 Transfer Type SPT without device Cognition Performance Comments Orientation Level Oriented X4 Comprehension Level 7 Mode: Auditory Hearing Aid:None Glasses:Reading glasses Expression (Native Language) 7 Mode: Verbal 
No issues noted Social Interaction/Pragmatics 7 Appropriate Problem Solving 7 No issues noted Memory 7 No issues noted Activities of Daily Living  Score Comments Grooming 5 Tasks completed by patient: Brushed hair, Brushed teeth, Washed face, Shaved/applied makeup (optional), Washed hands S/U Bathing 4 Body Parts Bathe: Abdomen, Arm, left, Arm, right, Buttocks, Chest, Lower leg and foot, left, Lower leg and foot, right, Samantha area, Thigh, left, Thigh, right A for steadying and correcting LOB when reaching Tub/Shower Transfer [place] 4 Type of Shower: Shower Adaptive  Equipment:Tub transfer bench Comments: Steadying A, grab bars Upper Body Dressing/Undressing 4 Items Applied:Pullover (4 steps), Button shirt (4 steps) S/U; A for one button Lower Body Dressing/Undressing 4 Items Applied:Elastic waist pants (3 steps), Fasten shoe (1 step), Shoe, left (1 step), Shoe, right (1 step), Sock, left (1 step), Sock, right (1 step), Underpants (3 steps) Steadying A when standing to pull up pants Toileting 5 SBA Toilet Transfer 4 Steadying A, grab bars; gait belt Vision/Perception: No deficits noted. Session: Pt was in bed and agreeable to tx. Pt was open to answering questions and engaged appropriately. Pt was I with ADL prior to CVA. Pt expressed awareness of LUE deficits emphasizing poor dexterity and difficulty grading pressure. Pt's performance with ADL is reflected in above chart. Pt stood from EOB with CGA and ambulated to toilet with Min A for steadying. Pt transferred onto toilet with steadying A and use of grab bars. Pt had 2 instances of LOB during shower when reaching down to grab soap and required therapist intervention to provide steadying A. Pt required steadying A when standing to don pants. Pt completed box and blocks assessment in the gym with results below. Box and Blocks Assessment UE Blocks Right 35 Left 18 Pt was left in R [facility] 23 in room with all needs within reach. Interdisciplinary Communication: RN Yanelis Priest on med management. Patient/Family Education: Patient was/were educated On the role of OT, On POC and On IRC expectations. Problem List: [facility], Activity Tolerance, Safety Awareness, Strength, Sitting Balance and Standing Balance Functional Limitations: ADL, IADL, Functional Transfers and Functional Mobility Goals: Please see Care Plan OT order received and chart reviewed. OT orders have been acknowledged. Patient will benefit from skilled OT services to address ADL, functional transfers, UE strength, UE coordination, balance and activity tolerance to maximize functional performance with daily self-care tasks and functional mobility. Treatment is likely to include ADL, Balance, Strength, Activity tolerance, Neuro-muscular re-education, Family  and Safety awareness training to increase independence with self-care. Patient will be seen for 1.5-2 hours of skilled OT services 5-6 days a week as appropriate. Initate POC. Sofia MAGANA 
2/5/2019

## 2019-02-05 NOTE — PROGRESS NOTES
PSYCHOLOGY PROGRESS NOTE Name:  Bright Hernandez Date of Service: 2/5/2019 Location of Service:   908/01 Type of Service: Health & Behavior - Assessment -03938 Duration:  15 Primary Diagnosis: None Summary of Service:  Engaged in screening with patient. Introduced psychological services in rehab, and, provided overview of the rehab process. Patient does not endorse any symptoms consistent with a clinically severe mood episode. Patient does not verbalize  need for psychological services at this time. Jihan Pena Psy.D.  
Psychologist

## 2019-02-06 PROCEDURE — 97112 NEUROMUSCULAR REEDUCATION: CPT

## 2019-02-06 PROCEDURE — 99232 SBSQ HOSP IP/OBS MODERATE 35: CPT | Performed by: PHYSICAL MEDICINE & REHABILITATION

## 2019-02-06 PROCEDURE — 97116 GAIT TRAINING THERAPY: CPT

## 2019-02-06 PROCEDURE — 97530 THERAPEUTIC ACTIVITIES: CPT

## 2019-02-06 PROCEDURE — 65310000000 HC RM PRIVATE REHAB

## 2019-02-06 PROCEDURE — 74011250637 HC RX REV CODE- 250/637: Performed by: PHYSICAL MEDICINE & REHABILITATION

## 2019-02-06 PROCEDURE — 97110 THERAPEUTIC EXERCISES: CPT

## 2019-02-06 PROCEDURE — 77030032490 HC SLV COMPR SCD KNE COVD -B

## 2019-02-06 RX ADMIN — AMLODIPINE BESYLATE 5 MG: 5 TABLET ORAL at 08:38

## 2019-02-06 RX ADMIN — ASPIRIN 81 MG: 81 TABLET, COATED ORAL at 08:37

## 2019-02-06 RX ADMIN — ATORVASTATIN CALCIUM 80 MG: 40 TABLET, FILM COATED ORAL at 21:00

## 2019-02-06 NOTE — PROGRESS NOTES
Problem: Falls - Risk of 
Goal: *Absence of Falls Document Sujatha Landaverde Fall Risk and appropriate interventions in the flowsheet. Outcome: Progressing Towards Goal 
Fall Risk Interventions: 
Mobility Interventions: Communicate number of staff needed for ambulation/transfer, OT consult for ADLs, Patient to call before getting OOB, PT Consult for mobility concerns, PT Consult for assist device competence, Strengthening exercises (ROM-active/passive), Utilize walker, cane, or other assistive device Medication Interventions: Assess postural VS orthostatic hypotension, Evaluate medications/consider consulting pharmacy, Patient to call before getting OOB, Teach patient to arise slowly Elimination Interventions: Call light in reach, Patient to call for help with toileting needs, Urinal in reach

## 2019-02-06 NOTE — PROGRESS NOTES
Spoke with wife Sonia Memory her phone number changed new number [phone]. She requested a referral for a new physician because his is in [place].   Sent email to Allison Rincon for referral.

## 2019-02-06 NOTE — PROGRESS NOTES
Spoke with wife Larkin Heimlich, and gave her the information on a new primary care physician when he is discharged. Dr Bola Medel internal medicine. Verbalized understanding.

## 2019-02-06 NOTE — PROGRESS NOTES
OT Daily Note Time In 1300 Time Out 1356 Subjective: \"You know, when I was out there I didn't even realize I had it on me. \" [referring to 2 pound weight] Pain: none reported Education: LUE coordination tasks, benefits of rehab, transfer techniques Interdisciplinary Communication: up at OT table by rehab tech Precautions: Other (comment)(falls) Location on arrival: up at OT table Neuro-Muscular Re-Education Daily Assessment Patient completed bilateral coordination and proprioception task using medium density theraputty to remove 20 beads using LUE, with 2 pound weight donned on LUE. Patient dropped 4/20 beads using LUE. Patient rolled putty out using BUE with focus on weightbearing through BUE. Patient then replaced all small beads using LUE with beads handed to patient, with focus on tip to tip grasp pattern. Would benefit from further neuro re-education efforts for LUE. Transfers Daily Assessment Patient transferred [facility] to [facility] using SPT with RW with min A and with one LOB to anterior when L foot dragged on floor, requiring min A to correct. Remains a high fall risk due to L foot dragging during mobility. Education Daily Assessment Patient reporting desire to return to driving soon; discussed driving rehab program at [facility] and waiting 3 months post stroke before attempting driving test. Patient reporting he drives with his right leg and can use his right arm; discussed safety implications of driving one handed and implications LUE ataxia with regarding to steering wheel management. Patient verbalized understanding. Would benefit from further education. Cognition Daily Assessment Orientation: alert and oriented x 4 Expression: able to express needs verbally Comprehension: understands basic instructions, no assist with complex instructions Social Interaction: cooperating, appropriate with OT, participating, motivated to improve Problem Solving: solves routine problems, minimal assist with complex problems Memory: recalls people frequently seen, able to complete 3/3 step commands Patient was left seated up in recliner with call light/all needs in reach and in no distress. Assessment: Making good progress. Remains with ataxia LUE. Plan: Continue OT POC with focus on ADL/IADL skills, functional transfers, neuro re-education, safety training, functional mobility, coordination, strength, static and dynamic balance, and activity tolerance to maximize safety and independence with ADLs and functional transfers. Radha Nugyen MS, OTR/L 
2/6/2019 Note may contain the following abbreviations:  
Abbreviation Explanation AROM Active range of motion PROM Passive range of motion SPT Stand pivot transfer LPT Lateral pivot transfer WC wheelchair RW Rolling walker BUE Bilateral upper extremities BLE Bilateral lower extremities WBAT Weight bearing as tolerated TTWB Toe-touch weight bearing AD Adaptive device AE Adaptive equipment NMES Neuro muscular electrical stimulation UBE Upper body ergometer

## 2019-02-06 NOTE — PROGRESS NOTES
PHYSICAL THERAPY DAILY NOTE Time In: 6745 Time Out: 6452 Patient Seen For: AM;Other (see progress notes); Transfer training;Gait training; Therapeutic exercise Subjective: Patient had no complaints. \" I feel like i'm getting stronger. \" 
 
  
 
Objective: No pain noted. Other (comment)(fall precautions) GROSS ASSESSMENT Daily Assessment COGNITION Daily Assessment BED/MAT MOBILITY Daily Assessment Rolling Right : 0 (Not tested) Rolling Left : 0 (Not tested) Supine to Sit : 0 (Not tested) Sit to Supine : 0 (Not tested) TRANSFERS Daily Assessment Transfer Type: SPT without device Transfer Assistance : 4 (Minimal assistance) Sit to Stand Assistance: Contact guard assistance Car Transfers: Not tested GAIT Daily Assessment Amount of Assistance: 4 (Contact guard assistance) Distance (ft): 120 Feet (ft) Assistive Device: Gait belt; Other (comment)(ace wrap to assist DF during gait) STEPS or STAIRS Daily Assessment Level of Assist : 0 (Not tested) BALANCE Daily Assessment WHEELCHAIR MOBILITY Daily Assessment LOWER EXTREMITY EXERCISES Daily Assessment Extremity: Both Exercise Type #1: Other (comment)(balance e xs for step length and gait sequence) Sets Performed: 5 Reps Performed: 0 Level of Assist: Contact guard assistance Assessment: Patient at times is impulsive and makes excuses for stumbling. He doesn't realize all his deficits from stroke. Plan of Care: Continue with plan of care to reach PT goals. Will work on stairs this PM and valentin Rowland, PTA 
2/6/2019

## 2019-02-06 NOTE — PROGRESS NOTES
OT Daily Note Time In 1000 Time Out 1100 Subjective: Patient stated that using L UE wrist weight helps decrease the shakiness of his L UE with tasks. Pain: No complaints of pain Education: Pt educated on technique with Neuro Re-education tasks Interdisciplinary Communication: PT 
Precautions: Other (comment)(falls) Neuro-Muscular Re-Education Patient performed reaching activity using different vertical heights and small rings with L UE at a time while donning 2lb pound wrist weights with fair quality and extra time, working on L UE proprioception, L UE reach/endurance/coordination, cognition, and visual motor skills. Patient performed reaching activity using different vertical heights placing wooden board with hole onto dowel with L UE at a time while donning 2 pound wrist weights with fair quality and extra time, working on L UE proprioception, L UE reach/endurance/coordination, cognition, and visual motor skills. Patient performed Rice Activity, finding and removing 18 small plastic animal objects and 18 marbles with L UE while donning 2 pound wrist weight, working on L UE coordination/endurance/reach, cognition, visual motor skills, and L UE proprioception. Assessment: The 2 lb wrist weight on L UE seemed to decrease L UE ataxia with tasks. Supplied patient with 2 lb wrist weight to use in his room with daily tasks. Plan: Cont OT per tx plan

## 2019-02-06 NOTE — PROGRESS NOTES
PHYSICAL THERAPY DAILY NOTE Time In: 2164 Time Out: 1610 Patient Seen For: PM;Other (see progress notes); Transfer training;Gait training; Therapeutic exercise Subjective: \" Im ready to work. \" 
 
  
 
Objective: No pain noted. Other (comment)(fall precautions) GROSS ASSESSMENT Daily Assessment COGNITION Daily Assessment  
 intact BED/MAT MOBILITY Daily Assessment Rolling Right : 7 (Independent) Rolling Left : 7 (Independent) Supine to Sit : 7 (Independent) Sit to Supine : 7 (Independent) TRANSFERS Daily Assessment Transfer Type: SPT without device Transfer Assistance : 4 (Minimal assistance) Sit to Stand Assistance: Contact guard assistance Car Transfers: Not tested GAIT Daily Assessment Ambulated 10 feet without ace wrap a. Left ankle rotated into excessive inverting at times. Patient will need a device to assist left ankle for safer gait. Amount of Assistance: 4 (Contact guard assistance) Distance (ft): 120 Feet (ft) Assistive Device: Gait belt;Cane, straight;Other (comment)(Ace wrap to assist DF on left) STEPS or STAIRS Daily Assessment Level of Assist : 0 (Not tested) BALANCE Daily Assessment WHEELCHAIR MOBILITY Daily Assessment LOWER EXTREMITY EXERCISES Daily Assessment Extremity: Both Exercise Type #1: Other (comment)(quadriped and kneeling exs for balance and coordination for gait pattern) Sets Performed: 2 Reps Performed: 0 Level of Assist: Minimal assistance Exercise Type #2: Other (comment)(standing balanace exs using ladder for step length and gait pattern) Sets Performed: 4 Reps Performed: 0 Level of Assist: Minimal assistance Assessment: Patient is very motivated but is impulsive. Plan of Care: Continue with plan of care to reach PT goals. Will work on stairs in [address] [place] [address] 
2/6/2019

## 2019-02-06 NOTE — PROGRESS NOTES
[facility] PROGRESS NOTE Hermelindo Woods Admit Date: 2/4/2019 Admit Diagnosis: CVA (cerebral vascular accident) ([facility] [address] [I63.9]; Left hemiparesis ([facility] [address] [G81.94]; Sensory deficit, left [R41.89]; Gait difficulty [R26.9] Subjective  
 
Vss. Afebrile. Po intake is good without signs of aspiration. Had a BM. PT/OT tolerated well. Requires minimum assistance for gait and transfers. Objective:  
 
Current Facility-Administered Medications Medication Dose Route Frequency  amLODIPine (NORVASC) tablet 5 mg  5 mg Oral DAILY  aspirin delayed-release tablet 81 mg  81 mg Oral DAILY  atorvastatin (LIPITOR) tablet 80 mg  80 mg Oral QHS  nicotine (NICODERM CQ) 21 mg/24 hr patch 1 Patch  1 Patch TransDERmal DAILY Review of Systems:Denies chest pain, shortness of breath, cough, headache, visual problems, abdominal pain, dysurea, calf pain. Pertinent positives are as noted in the medical records and unremarkable otherwise. Visit Vitals /78 Pulse 78 Temp 97.8 °F (36.6 °C) Resp 18 SpO2 97% Physical Exam: Psych: Patient was oriented to person, place, and time. Without hallucinations, abnormal affect or abnormal behaviors during the examination. General:   Alert, appears stated age, No acute distress. HEENT:  Normocephalic, EOMI, facial symmetry  Intact. No JVD sitting. Lungs:  CTA Bilaterally,Respiration even and unlabored No apparent use of accessory muscles for respiration. No nasal alar flaring. Heart:  Regular rate and rhythm, S1, S2, No obvious murmur Genitourinary: defered Abdomen:  Soft, non-tender to palpation in all four quadrants. Bowel sounds present. Neuromuscular:  PERRL, EOMI 
LUE     Shoulder abduction   4/5 Elbow flexion:  4 /5 Wrist extension:  4 /5 Finger flexion;  4 /5 
            ADMQ:   4/5 RUE    Shoulder abduction:  5/5 Elbow flexion:  5 /5             Wrist extension: 5 /5 
 Finger flexion:   5/5 
            ADMQ:  5 /5 LLE     Hip flexion:   4/5 Knee extension:  4 /5 Ankle dorsiflexion: 4  /5 Ankle plantarflexion:  4 /5                                    
RLE     Hip flexion:  5 /5 Knee extension:   5/5 Ankle dorsiflexion: 5  /5 Ankle plantarflexion:  5 /5 Sensory - decreased sensation to soft touch, pin prock, proprioception Left side. LUE>LLE limb ataxia.  
   
Skin:  Intact, dry, good skin turgor, age related changes present Edema: none Functional Assessment: 
   
   
Balance Sitting - Static: Good (unsupported) (02/05/19 1000) Sitting - Dynamic: Fair (occasional) (02/05/19 1000) Standing - Static: Good (02/05/19 1000) Jaqueline Merino Fall Risk Assessment: 
[facility] Mobility: Ambulates or transfers with assist devices or assistance (02/06/19 0720) Mobility Interventions: Communicate number of staff needed for ambulation/transfer;OT consult for ADLs; Patient to call before getting OOB;PT Consult for mobility concerns;PT Consult for assist device competence;Strengthening exercises (ROM-active/passive); Utilize walker, cane, or other assistive device (02/05/19 2205) Mentation: Alert, oriented x 3 (02/06/19 0720) Medication: Patient receiving anticonvulsants, sedatives(tranquilizers), psychotropics or hypnotics, hypoglycemics, narcotics, sleep aids, antihypertensives, laxatives, or diuretics (02/06/19 0720) Medication Interventions: Assess postural VS orthostatic hypotension; Evaluate medications/consider consulting pharmacy; Patient to call before getting OOB; Teach patient to arise slowly (02/05/19 2205) Elimination: Needs assistance with toileting (02/06/19 0720) Elimination Interventions: Call light in reach; Patient to call for help with toileting needs;Urinal in reach (02/05/19 2205) Prior Fall History: No (02/06/19 0720) Total Score: 3 (02/06/19 0720) Standard Fall Precautions: Yes (02/06/19 0720) High Fall Risk: Yes (02/06/19 0720) Speech Assessment: 
    
 
Ambulation: 
Gait Distance (ft): 120 Feet (ft) (02/06/19 1242) Assistive Device: Gait belt; Other (comment)(ace wrap to assist DF during gait) (02/06/19 1242) Rail Use: Both (02/04/19 1700) Labs/Studies: 
Recent Results (from the past 72 hour(s)) CBC WITH AUTOMATED DIFF Collection Time: 02/05/19  5:42 AM  
Result Value Ref Range WBC 5.7 4.3 - 11.1 K/uL  
 RBC 4.63 4.23 - 5.6 M/uL  
 HGB 12.4 (L) 13.6 - 17.2 g/dL HCT 40.3 (L) 41.1 - 50.3 % MCV 87.0 79.6 - 97.8 FL  
 MCH 26.8 26.1 - 32.9 PG  
 MCHC 30.8 (L) 31.4 - 35.0 g/dL  
 RDW 15.9 (H) 11.9 - 14.6 % PLATELET 375 352 - 157 K/uL MPV 11.8 9.4 - 12.3 FL ABSOLUTE NRBC 0.00 0.0 - 0.2 K/uL  
 DF AUTOMATED NEUTROPHILS 51 43 - 78 % LYMPHOCYTES 31 13 - 44 % MONOCYTES 11 4.0 - 12.0 % EOSINOPHILS 5 0.5 - 7.8 % BASOPHILS 1 0.0 - 2.0 % IMMATURE GRANULOCYTES 0 0.0 - 5.0 %  
 ABS. NEUTROPHILS 2.9 1.7 - 8.2 K/UL  
 ABS. LYMPHOCYTES 1.8 0.5 - 4.6 K/UL  
 ABS. MONOCYTES 0.6 0.1 - 1.3 K/UL  
 ABS. EOSINOPHILS 0.3 0.0 - 0.8 K/UL  
 ABS. BASOPHILS 0.1 0.0 - 0.2 K/UL  
 ABS. IMM. GRANS. 0.0 0.0 - 0.5 K/UL METABOLIC PANEL, BASIC Collection Time: 02/05/19  5:42 AM  
Result Value Ref Range Sodium 141 136 - 145 mmol/L Potassium 3.6 3.5 - 5.1 mmol/L Chloride 109 (H) 98 - 107 mmol/L  
 CO2 25 21 - 32 mmol/L Anion gap 7 7 - 16 mmol/L Glucose 86 65 - 100 mg/dL BUN 13 8 - 23 MG/DL Creatinine 0.63 (L) 0.8 - 1.5 MG/DL  
 GFR est AA >60 >60 ml/min/1.73m2 GFR est non-AA >60 >60 ml/min/1.73m2 Calcium 8.4 8.3 - 10.4 MG/DL MAGNESIUM Collection Time: 02/05/19  5:42 AM  
Result Value Ref Range Magnesium 2.1 1.8 - 2.4 mg/dL Assessment: Problem List as of 2/6/2019 Never Reviewed Codes Class Noted - Resolved Gait difficulty ICD-10-CM: R26.9 ICD-9-CM: 781.2  2/4/2019 - Present CVA (cerebral vascular accident) [facility]) ICD-10-CM: I63.9 ICD-9-CM: 434.91  2/4/2019 - Present Left hemiparesis ([facility] [address] ICD-10-CM: G81.94 
ICD-9-CM: 342.90  2/4/2019 - Present Sensory deficit, left ICD-10-CM: R41.89 ICD-9-CM: 780.99  2/4/2019 - Present Assessment/plan:  
 
Continue intensive Physical Therapy for a minimum of 1.5 hours a day, at least 5 out of 7 days per week to address bed mobility, transfers, ambulation, strengthening, balance, and endurance. Begin intensive Occupational Therapy for a minimum of 1.5 hours a day, at least 5 out of 7 days per week to address ADL ( bathing, LE dressing, toileting) and adaptive equipment as needed. - focus on left sided weakness, control deficit.  
  
The patient will also require 24-hour skilled rehabilitation nursing for bowel and bladder management, skin care for decubitus ulcer prevention , pain management and ongoing medication administration Continue daily physician medical management: CVA  - small acute early subacute infarction in the right thalamus. CTA  
- left  Hemiparesis 
- continue secondary prevention management. - aspirin, statin BP  Control, risk factor reduction. - 2/5 LUE motor strength is fairly good, however he has poor control of his arm, more distally. PT using DF assist to improve RLE clearance, gait pattern. 2/6 - continue PT/OT efforts. No new barriers. LUE ataxia, decerased proprioception, LLE decreased proprioception are major barriers.  
  
  
Hypertension - - BP fairly controlled. No new changes. Continue norvasc 5 daily 2/6 - -140s. Acceptable on current therapy. 
  
Hypercholesterolemia - lipitor 80 
  
Smoker - continue nicoderm.   
- counceling.  
  
Pneumonia prophylaxis- Insentive spirometer every hour while awake 
  
 DVT risk / DVT Prophylaxis- Will require daily physician exam to assess for signs and symptoms as patient is at increased risk for of thromboembolism. Mobilization as tolerated. Intermittent pneumatic compression devices when in bed Thigh-high or knee-high thromboembolic deterrent hose when out of bed.   
- mobility is good. contiue SCDs 
- no s/s of DVT.   
Potential urinary retention/ neurogenic bladder - schedule voids q6-8 hrs. Check post-void residual every shift; In and Out catheterize if post-void residual is more than 400 cc. 
- continent 
  
bowel program - monitor.  
- + BM. Continent. Time spent was 25 minutes with over 1/2 in direct patient care/examination, consultation and coordination of care. Signed By: Lissette Cruz MD   
 February 6, 2019

## 2019-02-07 PROCEDURE — 97535 SELF CARE MNGMENT TRAINING: CPT

## 2019-02-07 PROCEDURE — 74011250637 HC RX REV CODE- 250/637: Performed by: PHYSICAL MEDICINE & REHABILITATION

## 2019-02-07 PROCEDURE — 97530 THERAPEUTIC ACTIVITIES: CPT

## 2019-02-07 PROCEDURE — 97110 THERAPEUTIC EXERCISES: CPT

## 2019-02-07 PROCEDURE — 97116 GAIT TRAINING THERAPY: CPT

## 2019-02-07 PROCEDURE — 65310000000 HC RM PRIVATE REHAB

## 2019-02-07 PROCEDURE — 99232 SBSQ HOSP IP/OBS MODERATE 35: CPT | Performed by: PHYSICAL MEDICINE & REHABILITATION

## 2019-02-07 RX ADMIN — AMLODIPINE BESYLATE 5 MG: 5 TABLET ORAL at 08:29

## 2019-02-07 RX ADMIN — ASPIRIN 81 MG: 81 TABLET, COATED ORAL at 08:29

## 2019-02-07 RX ADMIN — ATORVASTATIN CALCIUM 80 MG: 40 TABLET, FILM COATED ORAL at 20:40

## 2019-02-07 NOTE — PROGRESS NOTES
02/07/19 1255 Time Spent With Patient Time In 1255 Time Out 1340 OT Daily Note Time In 1255 Time Out 1340 Subjective:\"I am ready. \" Agreeable to therapy. Pain:denied during session. Education:On LUE neuro recovery. Interdisciplinary Communication: Collaborated with PTA, Carlos Parent and patient is making progress towards goals. Precautions: Other (comment)(falls) Balance/functional mobility Daily Assessment Able to transfer from recliner to w/c with CGA. [facility] LUE Daily Assessment [facility]  pegs used for 25 minutes to increase LUE sensation, coordination, and functional use, this was challenging for patient, but good patience. Coins used with 2 point pincer LUE to transport and push through slot, completed 40 times in 10 minutes. Ended session:In w/c in therapy gym with PTA, Carlos Parent Dorian Orozco OTR/CHIRAG

## 2019-02-07 NOTE — PROGRESS NOTES
SFD PROGRESS NOTE Pawan Limon Admit Date: 2/4/2019 Admit Diagnosis: CVA (cerebral vascular accident) ([facility] [address] [I63.9]; Left hemiparesis ([facility] [address] [G81.94]; Sensory deficit, left [R41.89]; Gait difficulty [R26.9] Subjective  
 
Vss. Afebrile. Still demonstrates significant LUE, LLE dysmetria. L foot excessive inverting at times. Patient will need a device to assist left ankle for safer gait. Objective:  
 
Current Facility-Administered Medications Medication Dose Route Frequency  amLODIPine (NORVASC) tablet 5 mg  5 mg Oral DAILY  aspirin delayed-release tablet 81 mg  81 mg Oral DAILY  atorvastatin (LIPITOR) tablet 80 mg  80 mg Oral QHS  nicotine (NICODERM CQ) 21 mg/24 hr patch 1 Patch  1 Patch TransDERmal DAILY Review of Systems:Denies chest pain, shortness of breath, cough, headache, visual problems, abdominal pain, dysurea, calf pain. Pertinent positives are as noted in the medical records and unremarkable otherwise. Visit Vitals /76 (BP 1 Location: Right arm, BP Patient Position: At rest) Pulse 84 Temp 98.1 °F (36.7 °C) Resp 17 SpO2 97% Physical Exam:  
    
General:   Alert, appears stated age. HEENT:  Normocephalic, EOMI, facial symmetry  Intact. No JVD sitting. Lungs:  CTA Bilaterally, no wheezing. Heart:  Regular rate and rhythm, S1, S2, No obvious murmur Genitourinary: defered Abdomen:  Soft, non-tender to palpation in all four quadrants. Neuromuscular:  PERRL, EOMI 
LUE     Shoulder abduction   4/5 Elbow flexion:  4 /5 Wrist extension:  4 /5 Finger flexion;  4 /5 
            ADMQ:   4/5 RUE    Shoulder abduction:  5/5 Elbow flexion:  5 /5 Wrist extension: 5 /5 Finger flexion:   5/5 
            ADMQ:  5 /5 LLE     Hip flexion:   4/5 Knee extension:  4 /5             Ankle dorsiflexion: 4  /5 
 Ankle plantarflexion:  4 /5                                    
RLE     Hip flexion:  5 /5 Knee extension:   5/5 Ankle dorsiflexion: 5  /5 Ankle plantarflexion:  5 /5 Sensory - decreased sensation to soft touch, pin prock, proprioception Left side. LUE>LLE limb ataxia.  
   
Skin:  Intact, dry, good skin turgor, age related changes present Edema: none Functional Assessment: 
   
   
Balance Sitting - Static: Good (unsupported) (02/05/19 1000) Sitting - Dynamic: Fair (occasional) (02/05/19 1000) Standing - Static: Good (02/05/19 1000) Em End Fall Risk Assessment: 
Stephanie Trimble Risk Mobility: Ambulates or transfers with assist devices or assistance (02/07/19 0720) Mobility Interventions: Communicate number of staff needed for ambulation/transfer;OT consult for ADLs; Patient to call before getting OOB;PT Consult for mobility concerns;PT Consult for assist device competence;Strengthening exercises (ROM-active/passive); Utilize walker, cane, or other assistive device (02/06/19 2202) Mentation: Alert, oriented x 3 (02/07/19 0720) Medication: Patient receiving anticonvulsants, sedatives(tranquilizers), psychotropics or hypnotics, hypoglycemics, narcotics, sleep aids, antihypertensives, laxatives, or diuretics (02/07/19 0720) Medication Interventions: Assess postural VS orthostatic hypotension; Evaluate medications/consider consulting pharmacy; Patient to call before getting OOB; Teach patient to arise slowly (02/06/19 2202) Elimination: Needs assistance with toileting (02/07/19 0720) Elimination Interventions: Call light in reach;Urinal in reach; Patient to call for help with toileting needs (02/06/19 2202) Prior Fall History: No (02/07/19 0720) Total Score: 3 (02/07/19 0720) Standard Fall Precautions: Yes (02/07/19 0720) High Fall Risk: Yes (02/07/19 0799) Speech Assessment: 
    
 
Ambulation: 
Gait Distance (ft): 120 Feet (ft) (02/06/19 1639) Assistive Device: Gait belt;Cane, straight;Other (comment)(Ace wrap to assist DF on left) (02/06/19 1639) Rail Use: Both (02/04/19 1700) Labs/Studies: 
Recent Results (from the past 72 hour(s)) CBC WITH AUTOMATED DIFF Collection Time: 02/05/19  5:42 AM  
Result Value Ref Range WBC 5.7 4.3 - 11.1 K/uL  
 RBC 4.63 4.23 - 5.6 M/uL  
 HGB 12.4 (L) 13.6 - 17.2 g/dL HCT 40.3 (L) 41.1 - 50.3 % MCV 87.0 79.6 - 97.8 FL  
 MCH 26.8 26.1 - 32.9 PG  
 MCHC 30.8 (L) 31.4 - 35.0 g/dL  
 RDW 15.9 (H) 11.9 - 14.6 % PLATELET 080 411 - 836 K/uL MPV 11.8 9.4 - 12.3 FL ABSOLUTE NRBC 0.00 0.0 - 0.2 K/uL  
 DF AUTOMATED NEUTROPHILS 51 43 - 78 % LYMPHOCYTES 31 13 - 44 % MONOCYTES 11 4.0 - 12.0 % EOSINOPHILS 5 0.5 - 7.8 % BASOPHILS 1 0.0 - 2.0 % IMMATURE GRANULOCYTES 0 0.0 - 5.0 %  
 ABS. NEUTROPHILS 2.9 1.7 - 8.2 K/UL  
 ABS. LYMPHOCYTES 1.8 0.5 - 4.6 K/UL  
 ABS. MONOCYTES 0.6 0.1 - 1.3 K/UL  
 ABS. EOSINOPHILS 0.3 0.0 - 0.8 K/UL  
 ABS. BASOPHILS 0.1 0.0 - 0.2 K/UL  
 ABS. IMM. GRANS. 0.0 0.0 - 0.5 K/UL METABOLIC PANEL, BASIC Collection Time: 02/05/19  5:42 AM  
Result Value Ref Range Sodium 141 136 - 145 mmol/L Potassium 3.6 3.5 - 5.1 mmol/L Chloride 109 (H) 98 - 107 mmol/L  
 CO2 25 21 - 32 mmol/L Anion gap 7 7 - 16 mmol/L Glucose 86 65 - 100 mg/dL BUN 13 8 - 23 MG/DL Creatinine 0.63 (L) 0.8 - 1.5 MG/DL  
 GFR est AA >60 >60 ml/min/1.73m2 GFR est non-AA >60 >60 ml/min/1.73m2 Calcium 8.4 8.3 - 10.4 MG/DL MAGNESIUM Collection Time: 02/05/19  5:42 AM  
Result Value Ref Range Magnesium 2.1 1.8 - 2.4 mg/dL Assessment:  
 
Problem List as of 2/7/2019 Never Reviewed Codes Class Noted - Resolved Gait difficulty ICD-10-CM: R26.9 ICD-9-CM: 781.2  2/4/2019 - Present CVA (cerebral vascular accident) [facility]) ICD-10-CM: I63.9 ICD-9-CM: 434.91  2/4/2019 - Present Left hemiparesis ([address] ICD-10-CM: G81.94 
ICD-9-CM: 342.90  2/4/2019 - Present Sensory deficit, left ICD-10-CM: R41.89 ICD-9-CM: 780.99  2/4/2019 - Present Assessment/plan:  
 
Continue intensive Physical Therapy for a minimum of 1.5 hours a day, at least 5 out of 7 days per week to address bed mobility, transfers, ambulation, strengthening, balance, and endurance. Begin intensive Occupational Therapy for a minimum of 1.5 hours a day, at least 5 out of 7 days per week to address ADL ( bathing, LE dressing, toileting) and adaptive equipment as needed. - focus on left sided weakness, control deficit. - patient is fairly strong, but more limited by LUE, LLE dysmetria.  
  
The patient will also require 24-hour skilled rehabilitation nursing for bowel and bladder management, skin care for decubitus ulcer prevention , pain management and ongoing medication administration Continue daily physician medical management: CVA  - small acute early subacute infarction in the right thalamus. CTA  
- left  Hemiparesis 
- aspirin, statin BP  Control, risk factor reduction. - 2/5 LUE motor strength is fairly good, however he has poor control of his arm, more distally. PT using DF assist to improve RLE clearance, gait pattern. 2/6 - continue PT/OT efforts. No new barriers. LUE ataxia, decerased proprioception, LLE decreased proprioception are major barriers.  
 2/7 - L foot excessive inverting at times. Patient will need a device to assist left ankle for safer gait. continue medical management for CVA, secondary prevention. 
  
Hypertension - - BP fairly controlled. No new changes. Continue norvasc 5 daily 2/6 - -140s. Acceptable on current therapy. 2/7 - continue norvasc 5.  
  
Hypercholesterolemia - lipitor 80 
  
Smoker - continue nicoderm.   
- counceling.  
  
 Pneumonia prophylaxis- Insentive spirometer every hour while awake 
  
DVT risk / DVT Prophylaxis-   
- mobility is good. contiue SCDs 
- no s/s of DVT.   
Potential urinary retention/ neurogenic bladder - schedule voids q6-8 hrs. Check post-void residual every shift; In and Out catheterize if post-void residual is more than 400 cc. 
-2/7  continent. No signs of retention.  
  
bowel program - monitor.  
- + BM. Continent. Time spent was 25 minutes with over 1/2 in direct patient care/examination, consultation and coordination of care. Signed By: Christina Pickens MD   
 February 7, 2019

## 2019-02-07 NOTE — PROGRESS NOTES
PHYSICAL THERAPY DAILY NOTE Time In: 8576 Time Out: 1004 Patient Seen For: AM;Other (see progress notes); Transfer training;Gait training; Therapeutic exercise Subjective: Patient had no complaints. Objective: No pain noted. Other (comment)(fall precautions) GROSS ASSESSMENT Daily Assessment COGNITION Daily Assessment BED/MAT MOBILITY Daily Assessment Rolling Right : 0 (Not tested) Rolling Left : 0 (Not tested) Supine to Sit : 0 (Not tested) Sit to Supine : 0 (Not tested) TRANSFERS Daily Assessment Transfer Type: SPT without device Other: SPT with straight cane Transfer Assistance : 4 (Contact guard assistance) Sit to Stand Assistance: Contact guard assistance Car Transfers: Not tested GAIT Daily Assessment Used ace wrap to assist DF. Constant cues to slow down for correct gait sequence. Amount of Assistance: 4 (Contact guard assistance) Distance (ft): 100 Feet (ft) Assistive Device: Cane, straight;Other (comment)(ace wrap to assist DF) STEPS or STAIRS Daily Assessment Level of Assist : 0 (Not tested) BALANCE Daily Assessment WHEELCHAIR MOBILITY Daily Assessment LOWER EXTREMITY EXERCISES Daily Assessment Extremity: Both Exercise Type #1: Other (comment)(motomed x 10 minutes) Sets Performed: 1 Reps Performed: 10 Level of Assist: Supervision Exercise Type #2: Other (comment)(standing balance exs) Sets Performed: 4 Reps Performed: 0 Level of Assist: Minimal assistance Assessment: Patient is very motivated . Plan of Care: Continue with plan of care . Will try lite gait this PM.  
 
Ghazala Shore, PTA 
2/7/2019

## 2019-02-07 NOTE — PROGRESS NOTES
OT Daily Note Time In 1113 Time Out 1203 Precautions: L Joesph; falls Home: one story house with spouse; walk-in shower 
 
 Modified Box and Blocks Left Right  
2/5/19 18 blocks/min 35 blocks/min  
  
9 Hole Peg Test Left Right  
2/5/19 101.72 seconds 37.65 seconds  
  
Pain: Patient had no complaint of pain. Functional Mobility Pt ambulated down the hallway 200 ft with gait belt and CGA. Activity Tolerance Pt completed the following exercises with his LUE to promote AROM, activity tolerance, and shoulder stabilization for integration into functional tasks involving LUE. Exercise Sets Reps Comments Scapular Elevation/Depression 1 10 Scapular Protraction/Retraction 1 10 Shoulder abduction with elbow flexed 1 10 Uses R hand to hold L hand in place on chest  
 
Pt completed 4 minutes on the ergometer frontwards with mod resistance and 4 minutes backwards with min resistance in order to increase UB strength and activity tolerance for integration into ADL. Neuro-Muscular Re-Education Pt stood at standing frame for 10 min using his RUE to WB and his LUE to complete activity. Pt used LUE to reach forward and upward in the anterior plane to obtain various bolts in order to place them in in a board. Pt placed 10 bolts in the board with LUE. Pt removed bolts using LUE to push them up and RUE to grab them and place them in a box. Pt demonstrated good quality during this activity and maintained his dynamic standing balance with S. Pt sat down and took a rest break after completing task. Pt completed another activity for 10 min while standing which addressed activity tolerance as well as [address], dexterity, and in-hand manipulation of LUE. Pt used RUE to stabilize pieces and used LUE to snap matching pieces into place. At the beginning of this activity, pt balanced with 1.5 inch standing board under his LUE using his RLE to WB and shift weight.  After 5 min, the standing board was placed under his RUE and pt shifted weight to LLE. Pt required min verbal cues to straighten hips to correct body position but overall maintained good dynamic standing balance. Pt used a turkey baster to transfer water from one container to another. This activity targeted his light touch sensation and ability to grade pressure while completing FM activities. Pt verbalized he found the sensory input from the activity \"helpful\" and he expressed difficulty with dropping objects after holding them for short periods. Will plan to target light touch sensation in future sessions. Education CIMT, expectations for recovery, prosthetics/AE Interdisciplinary Communication: Discussed AE with Prosthetics Representative. Plan: Continue with POC. Pt was left in recliner with all needs within reach. Sofia Zarate OTS 
2/7/2019

## 2019-02-07 NOTE — PROGRESS NOTES
PHYSICAL THERAPY DAILY NOTE Time In: 1347 Time Out: 1434 Patient Seen For: PM;Other (see progress notes); Transfer training; Therapeutic exercise Subjective: Patient had no complaints. Objective: No pain noted. Other (comment)(fall precautions) GROSS ASSESSMENT Daily Assessment COGNITION Daily Assessment BED/MAT MOBILITY Daily Assessment Rolling Right : 0 (Not tested) Rolling Left : 0 (Not tested) Supine to Sit : 0 (Not tested) Sit to Supine : 0 (Not tested) TRANSFERS Daily Assessment Transfer Type: SPT without device Other: SPT with straight cane Transfer Assistance : 4 (Contact guard assistance) Sit to Stand Assistance: Contact guard assistance Car Transfers: Not tested GAIT Daily Assessment Amount of Assistance: 0 (Not tested) Distance (ft): 100 Feet (ft) Assistive Device: Cane, straight;Other (comment)(ace wrap to assist DF) STEPS or STAIRS Daily Assessment Level of Assist : 0 (Not tested) BALANCE Daily Assessment WHEELCHAIR MOBILITY Daily Assessment LOWER EXTREMITY EXERCISES Daily Assessment Worked on lite gait using mirror and standing on trampoline. Patient pushing with strong side leaning to the left. With mirror in front of patient really helped him see how balance was a big barrier for mobility. Extremity: Both Exercise Type #1: Other (comment)(lite gait on trampoline working on balance ) Sets Performed: 1 Reps Performed: 0 Level of Assist: Total assistance(used ace wrpa to assist DF on left) Exercise Type #2: Other (comment)(standing balance exs) Sets Performed: 4 Reps Performed: 0 Level of Assist: Minimal assistance Assessment: Patient doesn't realize all his deficits and needs to be reminded not to get up by himself . Plan of Care: Continue with plan of care to continue to work on balance to make him more independent with transfers and gait. Alcides Dickerson, PTA 
2/7/2019

## 2019-02-07 NOTE — PROGRESS NOTES
Time In 40-45-11-94 Time Out 3255 Mobility Score Comments Supine to Sit 6 (Modified independent) Hospital Bed Transfer Assist 4 Transfer Type: SPT without device Comments: Steadying A Activities of Daily Living  Score Comments Grooming 5 Tasks completed by patient: Brushed hair, Brushed teeth, Washed face, Washed hands Comments: S while standing to brush teeth Bathing 5 Body Parts Bathe: Abdomen, Arm, left, Buttocks, Arm, right, Chest, Lower leg and foot, left, Lower leg and foot, right, Samantha area, Thigh, right, Thigh, left Comments: CGA Tub/Shower Transfer [place] 4 Type of Shower: Shower Adaptive  Equipment:Tub transfer bench and Grab bars Comments: Steadying A and verbal cues for safety when sitting Upper Body Dressing/ 
Undressing 5 Items Applied:Pullover (4 steps) Comments: S/U Lower Body Dressing/ 
Undressing 5 Items Applied:Elastic waist pants (3 steps), Fasten shoe (1 step), Shoe, right (1 step), Shoe, left (1 step), Sock, left (1 step), Sock, right (1 step), Underpants (3 steps) Adaptive Equipment: SP [id number] [facility] [place] Comments: S/U Education  Safety awareness, hand placement during transfers, CIMT Precautions: L Joesph; falls Home: one story house with spouse; walk-in shower 
 
 Modified Box and Blocks Left Right  
2/5/19 18 blocks/min 35 blocks/min  
  
9 Hole Peg Test Left Right  
2/5/19 101.72 seconds 37.65 seconds  
  
Pt was in bed and agreeable to tx. Pt's performance with ADL is reflected in above chart. Pt ambulated 10' from EOB to shower bench with CGA. When transferring to the shower, pt required Steadying A and verbal redirection because pt initially sat on edge of bench. Pt corrected body position and showered with S. Pt reported using a SP Cane yesterday so one was obtained and used to ambulate from shower to bed. Pt stood at sink and completed grooming tasks with CGA and using LUE to WB. Pt was left in recliner with SEA Jj in room. Continue with POC. Sofia Zarate [facility] 
2/7/2019

## 2019-02-08 PROCEDURE — 97112 NEUROMUSCULAR REEDUCATION: CPT

## 2019-02-08 PROCEDURE — 99232 SBSQ HOSP IP/OBS MODERATE 35: CPT | Performed by: PHYSICAL MEDICINE & REHABILITATION

## 2019-02-08 PROCEDURE — 65310000000 HC RM PRIVATE REHAB

## 2019-02-08 PROCEDURE — 97116 GAIT TRAINING THERAPY: CPT

## 2019-02-08 PROCEDURE — 97110 THERAPEUTIC EXERCISES: CPT

## 2019-02-08 PROCEDURE — 97535 SELF CARE MNGMENT TRAINING: CPT

## 2019-02-08 PROCEDURE — 74011250637 HC RX REV CODE- 250/637: Performed by: PHYSICAL MEDICINE & REHABILITATION

## 2019-02-08 RX ADMIN — ASPIRIN 81 MG: 81 TABLET, COATED ORAL at 09:35

## 2019-02-08 RX ADMIN — AMLODIPINE BESYLATE 5 MG: 5 TABLET ORAL at 09:35

## 2019-02-08 RX ADMIN — ATORVASTATIN CALCIUM 80 MG: 40 TABLET, FILM COATED ORAL at 20:41

## 2019-02-08 NOTE — PROGRESS NOTES
SFD PROGRESS NOTE Ligia Jang Admit Date: 2/4/2019 Admit Diagnosis: CVA (cerebral vascular accident) ([facility] [address] [I63.9]; Left hemiparesis ([facility] [address] [G81.94]; Sensory deficit, left [R41.89]; Gait difficulty [R26.9] Subjective  
 
Vss. Afebrile. Progressing well in PT/OT. No new barriers noted. Objective:  
 
Current Facility-Administered Medications Medication Dose Route Frequency  amLODIPine (NORVASC) tablet 5 mg  5 mg Oral DAILY  aspirin delayed-release tablet 81 mg  81 mg Oral DAILY  atorvastatin (LIPITOR) tablet 80 mg  80 mg Oral QHS  nicotine (NICODERM CQ) 21 mg/24 hr patch 1 Patch  1 Patch TransDERmal DAILY Review of Systems:   Denies chest pain, shortness of breath, cough, headache, visual problems, abdominal pain, dysurea, calf pain. Pertinent positives are as noted in the medical records and unremarkable otherwise. Visit Vitals /77 Pulse 90 Temp 98.5 °F (36.9 °C) Resp 18 SpO2 97% Physical Exam:  
    
General:   Alert, appears stated age. NAD. HEENT:  Normocephalic No JVD sitting. Lungs:  CTA Bilaterally, no wheezing. Heart:  Regular rate and rhythm, S1, S2, No murmur Genitourinary: defered Abdomen:  Soft, non-tender to palpation in all four quadrants. Neuromuscular:  PERRL, EOMI 
LUE     Shoulder abduction   4+/5 Elbow flexion:  4 +/5 Wrist extension:  4+/5 Finger flexion;  4 /5 
            ADMQ:   4/5 RUE    Shoulder abduction:  5/5 Elbow flexion:  5 /5 Wrist extension: 5 /5 Finger flexion:   5/5 
            ADMQ:  5 /5 LLE     Hip flexion:   4+/5 Knee extension:  4+ /5 Ankle dorsiflexion: 4+  /5 Ankle plantarflexion:  4+/5 RLE     Hip flexion:  5 /5 Knee extension:   5/5 Ankle dorsiflexion: 5  /5             Ankle plantarflexion:  5 /5 
 Sensory - decreased sensation to soft touch, pin prock, proprioception Left side. LUE>LLE limb ataxia.  
   
Skin:  Intact, dry, good skin turgor, age related changes present Edema: none Functional Assessment: 
   
   
Balance Sitting - Static: Good (unsupported) (02/05/19 1000) Sitting - Dynamic: Fair (occasional) (02/05/19 1000) Standing - Static: Good (02/05/19 1000) Cherie Deluca Fall Risk Assessment: 
Kathie Esparzanell Risk Mobility: Ambulates or transfers with assist devices or assistance (02/08/19 0720) Mobility Interventions: Communicate number of staff needed for ambulation/transfer;OT consult for ADLs; Patient to call before getting OOB;PT Consult for mobility concerns;PT Consult for assist device competence;Utilize walker, cane, or other assistive device;Strengthening exercises (ROM-active/passive) (02/07/19 2128) Mentation: Alert, oriented x 3 (02/08/19 0720) Medication: Patient receiving anticonvulsants, sedatives(tranquilizers), psychotropics or hypnotics, hypoglycemics, narcotics, sleep aids, antihypertensives, laxatives, or diuretics (02/08/19 0720) Medication Interventions: Evaluate medications/consider consulting pharmacy; Patient to call before getting OOB; Teach patient to arise slowly (02/07/19 2128) Elimination: Needs assistance with toileting (02/08/19 0720) Elimination Interventions: Call light in reach; Patient to call for help with toileting needs;Urinal in reach (02/07/19 2128) Prior Fall History: No (02/08/19 0720) Total Score: 3 (02/08/19 0720) Standard Fall Precautions: Yes (02/08/19 0720) High Fall Risk: Yes (02/08/19 0720) Speech Assessment: 
    
 
Ambulation: 
Gait Distance (ft): 100 Feet (ft) (02/07/19 1111) Assistive Device: Cane, straight;Other (comment)(ace wrap to assist DF) (02/07/19 1111) Rail Use: Both (02/04/19 1700) Labs/Studies: No results found for this or any previous visit (from the past 72 hour(s)). Assessment:  
 
Problem List as of 2/8/2019 Never Reviewed Codes Class Noted - Resolved Gait difficulty ICD-10-CM: R26.9 ICD-9-CM: 781.2  2/4/2019 - Present CVA (cerebral vascular accident) [facility]) ICD-10-CM: I63.9 ICD-9-CM: 434.91  2/4/2019 - Present Left hemiparesis ([facility] [address] ICD-10-CM: G81.94 
ICD-9-CM: 342.90  2/4/2019 - Present Sensory deficit, left ICD-10-CM: R41.89 ICD-9-CM: 780.99  2/4/2019 - Present Assessment/plan:  
 
Continue intensive Physical Therapy for a minimum of 1.5 hours a day, at least 5 out of 7 days per week to address bed mobility, transfers, ambulation, strengthening, balance, and endurance. Begin intensive Occupational Therapy for a minimum of 1.5 hours a day, at least 5 out of 7 days per week to address ADL ( bathing, LE dressing, toileting) and adaptive equipment as needed. - focus on left sided weakness, control deficit. - patient is fairly strong, but more limited by LUE, LLE dysmetria.  
  
The patient will also require 24-hour skilled rehabilitation nursing for bowel and bladder management, skin care for decubitus ulcer prevention , pain management and ongoing medication administration Continue daily physician medical management: CVA  - small acute early subacute infarction in the right thalamus. CTA  
- left  Hemiparesis 
- aspirin, statin BP  Control, risk factor reduction. - 2/5 LUE motor strength is fairly good, however he has poor control of his arm, more distally. PT using DF assist to improve RLE clearance, gait pattern. 2/6 - continue PT/OT efforts. No new barriers. LUE ataxia, decerased proprioception, LLE decreased proprioception are major barriers.  
 2/7 - L foot excessive inverting at times. Patient will need a device to assist left ankle for safer gait. continue medical management for CVA, secondary prevention. 2/8 - feels stronger per pt. Still significant LUE dysmetria, sensory deficit. Continue aspirin.  
  
Hypertension -  Acceptable control.  
-  Continue norvasc 5 daily 
  
  
Hypercholesterolemia - lipitor 80 
  
Smoker - continue nicoderm. - counceling.  
  
Pneumonia prophylaxis- Insentive spirometer every hour while awake 
  
DVT risk / DVT Prophylaxis-   
- no s/s of DVT. Contiue SCDs 
  
Potential urinary retention/ neurogenic bladder -   
-  continent. No signs of retention.  
  
bowel program - monitor.  
- + BM. Continent. Time spent was 25 minutes with over 1/2 in direct patient care/examination, consultation and coordination of care. Signed By: Herb Thornton MD   
 February 8, 2019

## 2019-02-08 NOTE — PROGRESS NOTES
PHYSICAL THERAPY DAILY NOTE Time In: 5406 Time Out: 1115 Patient Seen For: AM;Balance activities;Gait training;Patient education; Therapeutic exercise;Transfer training; Other (see progress notes) Subjective: patient reporting he feels better ambulating without the cane. Reports he knows he needs to control how fast he does things but has tendency to get in a hurry. Reports he was having problems \"dragging\" his left toes before his CVA Objective:Vital Signs: 
Patient Vitals for the past 12 hrs: 
 Temp Pulse Resp BP SpO2  
02/08/19 0740 98.5 °F (36.9 °C) 90 18 125/77 97 % Pain level: No c/o pain during treatment Pain location:NA Pain interventions:NA Patient education:Balance training,transfer training, gait training, fall precautions, activity pacing, body mechanics,Patient verbalizing understanding and demonstrating partial understanding of patient education. Recommend follow up education. Interdisciplinary Communication:NA Other (comment)(fall precautions) GROSS ASSESSMENT Daily Assessment NA  
 
 
COGNITION Daily Assessment Orientation:A+O x 4 Communication:able to express needs verbally, able to follow multi step commands Social Interaction:cooperating, appropriate with PT, participating, motivated to improve Problem Solving:posey alarm with decreased safety awareness during functional mobility, increased risk for falling Memory:difficulty recalling safe body mechanics during transfers BED/MAT MOBILITY Daily Assessment Rolling Right : 0 (Not tested) Rolling Left : 0 (Not tested) Supine to Sit : 0 (Not tested) Sit to Supine : 0 (Not tested) TRANSFERS Daily Assessment Increased time and effort to complete with cues for body mechanics and to control speed of movement Transfer Type: SPT without device Transfer Assistance : 5 (Stand-by assistance) Sit to Stand Assistance: Stand-by assistance Car Transfers: Not tested GAIT Daily Assessment Gait training with cues for improved ankle DF from midswing to initial contact with improved knee ext at initial contact. Cues for symmetrical step length and cues to control gait speed Amount of Assistance: 4 (Minimal assistance) Distance (ft): 200 Feet (ft)(200ft x 2) Assistive Device: (Ace wrap for DF assist and to inhibit ankle inversion, assessed strap system for DF assist and to inhibit ankle inversion issued by orthotist. Improved DF assist with strap system but limited ability to control excessive ankle inversion Gait training in parallel bars x 40 ft x 3 facilitating cont step through gait with symmetrical step length, improved ankle DF and knee ext at initial contact with SBA and cues, single UE support on rail with RUE 
STEPS or STAIRS Daily Assessment Steps/Stairs Ambulated (#): 0 Level of Assist : 0 (Not tested) BALANCE Daily Assessment Static standing on 3 inch airex foam facilitating ankle strategies for balance control with min assist and cues Sitting - Static: Good (unsupported) Sitting - Dynamic: Good (unsupported) Standing - Static: Fair Standing - Dynamic : Impaired WHEELCHAIR MOBILITY Daily Assessment NA  
 
 
LOWER EXTREMITY EXERCISES Daily Assessment Cues for upright posture and symmetrical weight bearing through LEs inhibiting wt shift towards fore foot Extremity: Both Exercise Type #1: Standing lower extremity strengthening(Mini squats on 3 inch Airex foam) Sets Performed: 3 Reps Performed: 10 Level of Assist: Minimal assistance Assessment: patient progressing towards goals. Cont to require cues for safety during transfers and gait secondary to tendency to be impulsive with increased risk for falling during both activities. Able to control balance and body mechanics during transfers and gait better by decreasing speed of activity Patient to recreational therapy at end of treatment Plan of Care:Continue with POC and progress as tolerated. Rahul Pace, PT 
2/8/2019

## 2019-02-08 NOTE — PROGRESS NOTES
Subjective \"This left hand is weak but getting better. Thank you for helping me. \" Activity Standing corn hole toss. Strength/Endurance Patient with decreased left sided coordination and , dropping the bean bag 2 times when participating. He was without c/o tiredness or fatigue and eager to participate. Balance Sit<->stand:  CGA without AD Social Interaction Patient was friendly and conversational during the session. Cognitive A&O X4 Comments Patient was assisted to his room and into his recliner chair. He was left with all needs within reach.   
 
Myrna Barton, WENDYS

## 2019-02-08 NOTE — PROGRESS NOTES
Time In 7631 Time Out 1002 Precautions: L Joesph; falls Home: one story house with spouse; walk-in shower 
 
 Modified Box and Blocks Left Right  
2/5/19 18 blocks/min 35 blocks/min  
  
9 Hole Peg Test Left Right  
2/5/19 101.72 seconds 37.65 seconds  
  
Mobility Score Comments Supine to Sit 6 (Modified independent) Hospital Bed Transfer Assist 4 Transfer Type: SPT with Angie Shanda Comments: Steadying A Activities of Daily Living  Score Comments Grooming 5 Tasks completed by patient: Brushed hair, Brushed teeth, Washed face, Washed hands Comments: S/U Bathing 4 Body Parts Bathe: Abdomen, Arm, left, Arm, right, Buttocks, Chest, Lower leg and foot, right, Lower leg and foot, left, Samantha area, Thigh, left, Thigh, right Comments: A for Steadying and correcting LOB when standing Tub/Shower Transfer Centre 4 Type of Shower: Shower Adaptive  Equipment:Tub transfer bench, Grab bars and Angie Shanda Comments: CGA; Steadying A Upper Body Dressing/ 
Undressing 5 Items Applied:Pullover (4 steps) Comments: S/U Lower Body Dressing/ 
Undressing 5 Items Applied:Elastic waist pants (3 steps), Fasten shoe (1 step), Shoe, left (1 step), Shoe, right (1 step), Sock, left (1 step), Underpants (3 steps), Sock, right (1 step) Adaptive Equipment: N/A Comments: S/U; Min verbal cues to tie R shoe Education  Safety awareness, hand placement during transfers,  Stroke education about risk factors. Pt identified smoking and drinking as behaviors to be addressed. He expressed he hasn't smoked in 2 weeks and plans to decrease his daily alcohol consumption upon return home. Pain: Pt had no c/o pain. Pt was in bed and agreeable to tx. Pt's performance with ADL is reflected in above chart. Pt used single point cane to ambulate to shower with CGA. Pt showered with S and required Min A for steadying when standing to dry off.  Pt was encouraged to sit down to dry feet and responded appropriately to cues. Pt ambulated back to EOB with cane and CGA. Pt required min verbal cues to fasten R shoe. He was successful on 4th trial. Pt used LUE to WB on sink when brushing teeth. Pt was brought to gym in [place] [address]. Functional Mobility Pt stood at a raised table for 16 min and completed [employer] tree shoulder stabilization task. During this activity, pt used LUE to WB for a majority of the time but intermittently would engage with B hands. Pt stood with 1 inch block under his RLE to practice weight shifts and promote weightbearing onto LLE. Pt required mod verbal cues to correct hip alignment and distribute weight onto L side. Pt verbalized that shifting weight to his LLE felt \"uncomfortable and awkward\". Cues were given to keep L knee straight when standing. Neuro-Muscular Re-Education Pt completed the pipe tree activity in standing to promote LUE GMC, activity tolerance, and address ataxia for integration into ADL. Pt demonstrated good quality during this activity and completed 2 trials of task with 100% accuracy. Pt maintained his dynamic standing balance with Min A for steadying. Pt sat down and took a rest break after completing task. Pt completed the following exercises with his LUE to promote AROM, activity tolerance, and shoulder stabilization for integration into functional tasks involving LUE. Pt demonstrated good performance for all exercises. Exercise Sets Reps Comments Scapular Elevation/Depression 1 10    
Scapular Protraction/Retraction 1 10    
Shoulder abduction with elbow flexed 1 10 Uses R hand to hold L hand in place on chest  
 
Pt engaged in lacing activity to address [address], dexterity, and light touch sensation. Pt used LUE to remove lace from holes and RUE to stabilize. Pt demonstrated good performance during this activity. [facility] [place] [address] [facility]. Plan: Continue with POC. Pt was left in recliner with all needs within reach. Sofia Zarate OTS 2/8/2019

## 2019-02-08 NOTE — PROGRESS NOTES
PHYSICAL THERAPY DAILY NOTE Time In: 0661 Time Out: 1510 Patient Seen For: PM;Balance activities;Gait training;Patient education; Therapeutic exercise;Transfer training; Other (see progress notes) Subjective: patient reporting he feels like his walking is better this AM. Reports he is able to control his balance better when he slows down Objective:Vital Signs: 
Patient Vitals for the past 12 hrs: 
 Temp Pulse Resp BP SpO2  
02/08/19 0740 98.5 °F (36.9 °C) 90 18 125/77 97 % Pain level:No c/o pain during treatment Pain location:NA Pain interventions:NA Patient education:Balance training,transfer training, gait training, fall precautions, activity pacing, body mechanics, Patient verbalizing understanding and demonstrating partial understanding of patient education. Recommend follow up education. Interdisciplinary Communication:NA Other (comment)(fall precautions) GROSS ASSESSMENT Daily Assessment NA  
 
 
COGNITION Daily Assessment No change from AM  
 
 
BED/MAT MOBILITY Daily Assessment Rolling Right : 0 (Not tested) Rolling Left : 0 (Not tested) Supine to Sit : 0 (Not tested) Sit to Supine : 0 (Not tested) TRANSFERS Daily Assessment Increased time and effort to complete with cues for body mechanics and to control speed of movemtn Transfer Type: SPT without device Transfer Assistance : 5 (Stand-by assistance) Sit to Stand Assistance: Stand-by assistance Car Transfers: Not tested GAIT Daily Assessment Gait training with cues for improved ankle DF from midswing to initial contact with improved knee ext at initial contact. Cues for symmetrical step length and cues to control gait speed Amount of Assistance: 4 (Minimal assistance) Distance (ft): 400 Feet (ft) Assistive Device: (strap system for LLE DF assist and inhibit ankle inversion) Gait training x 20 ft x 4 in figure 8 pattern around bolsters with SBA to min assist for balance while making multiple turns. Gait training x 10 ft forward followed by 10 ft backward with SBA to min assist for balance with cues for improved width of base of support and step placement LLE when stepping bakcwards STEPS or STAIRS Daily Assessment Slow reciprocating pattern going up/down steps with cues for improved left foot clearance going up steps and improved left foot placement going down steps Steps/Stairs Ambulated (#): 4(4 steps x 3 with 1 min sitting rest break between) Level of Assist : 4 (Contact guard assistance) Rail Use: Right BALANCE Daily Assessment Sitting - Static: Good (unsupported) Sitting - Dynamic: Good (unsupported) Standing - Static: Fair Standing - Dynamic : Impaired WHEELCHAIR MOBILITY Daily Assessment NA  
 
 
LOWER EXTREMITY EXERCISES Daily Assessment Extremity: Left Exercise Type #1: Other (comment)(Standing LLE coordination exercises facilitating specific left foot placement on roverto on the floor in 3 different positions Sets Performed: 5 Reps Performed: 5 Level of Assist: Minimal assistance(for balance, cues for LLE foot placement) Assessment: Progressing towards goals. Improved gait pattern this PM with strap system for LLE. Improved ability to control gait pattern and balance during transfers and gait when controlling speed of movement Patient returned to room at end of treatment and remained up in recliner with LEs elevated and with needs in reach. Plan of Care: Continue with POC and progress as tolerated. Rafi Shaffer, PT 
2/8/2019

## 2019-02-08 NOTE — PROGRESS NOTES
Pt resting quietly without complaints of pain, respirations are even no distress observed. No change in assessment no needs voiced. Family at bedside.

## 2019-02-09 PROCEDURE — 97530 THERAPEUTIC ACTIVITIES: CPT

## 2019-02-09 PROCEDURE — 65310000000 HC RM PRIVATE REHAB

## 2019-02-09 PROCEDURE — 97112 NEUROMUSCULAR REEDUCATION: CPT

## 2019-02-09 PROCEDURE — 97110 THERAPEUTIC EXERCISES: CPT

## 2019-02-09 PROCEDURE — 74011250637 HC RX REV CODE- 250/637: Performed by: PHYSICAL MEDICINE & REHABILITATION

## 2019-02-09 PROCEDURE — 97116 GAIT TRAINING THERAPY: CPT

## 2019-02-09 RX ADMIN — ATORVASTATIN CALCIUM 80 MG: 40 TABLET, FILM COATED ORAL at 21:58

## 2019-02-09 RX ADMIN — ASPIRIN 81 MG: 81 TABLET, COATED ORAL at 08:24

## 2019-02-09 RX ADMIN — AMLODIPINE BESYLATE 5 MG: 5 TABLET ORAL at 08:24

## 2019-02-09 NOTE — PROGRESS NOTES
PHYSICAL THERAPY DAILY NOTE Time In: 0825 Time Out: 0774 Patient Seen For: AM;Balance activities;Gait training;Patient education; Therapeutic exercise;Transfer training; Wheelchair mobility Subjective: Patient agreeable to PT treatment today. States no C/O pain or discomfort at this time. Medications taken about 1 hour ago per RN. Last PT treatment went well. No new complaints at this time. Objective:  Patient up sitting in wc. SPT from  to [facility] with RW and CGA/SBA. Orientation Assessment :   Alert and age appropriately oriented. Affect/Behavior:   Appropriate and Cooperative. Basic Command Following:   intact. Safety/Judgment:   Delayed responses. Pain Present:   No. 
 
Other (comment)(fall precautions) GROSS ASSESSMENT Daily Assessment Measurements assessed during gait training. AROM: Generally decreased, functional 
Strength: Generally decreased, functional 
Coordination: Generally decreased, functional  
 
 
BED/MAT MOBILITY Daily Assessment Rolling Right : 6 (Modified independent) Rolling Left : 6 (Modified independent) Supine to Sit : 5 (Supervision) Sit to Supine : 6 (Modified independent) TRANSFERS Daily Assessment Verbal cues needed. Transfer Type: SPT without device Transfer Assistance : 5 (Stand-by assistance) Sit to Stand Assistance: Stand-by assistance Car Transfers: Not tested GAIT Daily Assessment Base of Support: Narrowed; Center of Gravity altered. Speed/Little: Fast pace; Fluctuations; delayed with distractions. Step Length: Right shortened;Left shortened. Gait Abnormalities: Trunk sway increased; Decreased step clearance LLE; Path deviations; Step thru gait. Distance (ft): 400 Feet (ft). Assistive Device: Gait belt; straight cane. Ambulation - Level of assistance: CGA/SBA. Interventions: Safety awareness training; Tactile cues;  Verbal cues Amount of Assistance: 4 (Contact guard assistance)(Numerous verbal cues for pace and balance) Distance (ft): 400 Feet (ft)(with activities along route) Assistive Device: Cane, straight;Orthotic device(AFO) COGNITION Daily Assessment Communication: extra time needed to understand requests. Social Interaction: patient presents appropriate, cooperating and participates in treatment. Problem Solving: Verbal cues to teach techniques for completing tasks. Memory: Executing requests without distractions. Communication: 
Social Interaction: 
Problem Solving: 
Memory: STEPS or STAIRS Daily Assessment Extra time for instructions and procedure. Steps/Stairs Ambulated (#): 4 Level of Assist : 4 (Contact guard assistance) Rail Use: Both  
 
 
BALANCE Daily Assessment Standing balance activities of various stance positions, eyes open/closed, 15 seconds each with CGA. Sitting - Static: Good (unsupported) Sitting - Dynamic: Good (unsupported) Standing - Static: Fair; Other (comment)(with SBA/CGA) Standing - Dynamic : Impaired WHEELCHAIR MOBILITY Daily Assessment Able to Propel (ft): 150 feet Functional Level: 3 Wheelchair Setup Assist Required : 4 (Minimal assistance) Wheelchair Management: Manages left brake;Manages right brake LOWER EXTREMITY EXERCISES Daily Assessment Extremity: Left Exercise Type #1: Standing lower extremity strengthening Sets Performed: 1 Reps Performed: 15 Level of Assist: Stand-by assistance(RW support) Exercise Type #2: Other (comment)(Standing balance) Sets Performed: 3 Reps Performed: (15 seconds) Level of Assist: Stand-by assistance Exercise Type #3: Other (comment)(Nustep) Sets Performed: 1 Reps Performed: (10 minutes level 3) Level of Assist: Completely independent Assessment: Education:  Teaching Method:   Demonstration, Explanation.   PT Impairments or Limitations:   Ambulation deficits with coordination, Balance deficits, Endurance deficits, Strength deficits, Transfer deficits. Rehab Potential Physical Therapy:   Good. No rashes, no erythema. No calf tenderness BLE. Patient performed all given exercises listed. Patient was taken back to room. Left in sitting position in wc, call bell and necessities in reach. Nurse notified of completion of treatment. Plan of Care: The patient has shown the ability to tolerate and benefit from physical therapy daily in a comprehensive inpatient rehabilitation program.  Continue intensive Physical Therapy  to address bed mobility, transfers, ambulation, strengthening, balance, and endurance. Continue with plan of care and focus on goals. Jenna Joe, PTA 
2/9/2019

## 2019-02-09 NOTE — PROGRESS NOTES
[facility] PROGRESS NOTE Yadira Cha Admit Date: 2/4/2019 Admit Diagnosis: CVA (cerebral vascular accident) ([facility] [address] [I63.9]; Left hemiparesis ([facility] [address] [G81.94]; Sensory deficit, left [R41.89]; Gait difficulty [R26.9] Subjective  
 
Vss. Afebrile. Progressing well in PT/OT. No new barriers noted. Patient seen and examined. No pain complaints. Denies dizziness, palpations, SOB, nausea or constipation. Participating in therapy. Objective:  
 
Current Facility-Administered Medications Medication Dose Route Frequency  amLODIPine (NORVASC) tablet 5 mg  5 mg Oral DAILY  aspirin delayed-release tablet 81 mg  81 mg Oral DAILY  atorvastatin (LIPITOR) tablet 80 mg  80 mg Oral QHS  nicotine (NICODERM CQ) 21 mg/24 hr patch 1 Patch  1 Patch TransDERmal DAILY Review of Systems:   Denies chest pain, shortness of breath, cough, headache, visual problems, abdominal pain, dysurea, calf pain. Pertinent positives are as noted in the medical records and unremarkable otherwise. Visit Vitals /78 (BP 1 Location: Left arm, BP Patient Position: At rest) Pulse 72 Temp 98.3 °F (36.8 °C) Resp 17 SpO2 96% Physical Exam:  
    
General:   Alert, appears stated age. NAD. HEENT:  Normocephalic No JVD sitting. Lungs:  CTA Bilaterally, no wheezing. Heart:  Regular rate and rhythm, S1, S2, No murmur Genitourinary: defered Abdomen:  Soft, non-tender to palpation in all four quadrants. Neuromuscular:  LUE     Shoulder abduction   4+/5 Elbow flexion:  4 +/5 Wrist extension:  4+/5 Finger flexion;  4 /5 
            ADMQ:   4/5 RUE    Shoulder abduction:  5/5 Elbow flexion:  5 /5 Wrist extension: 5 /5 Finger flexion:   5/5 
            ADMQ:  5 /5 LLE     Hip flexion:   4+/5 Knee extension:  4+ /5             Ankle dorsiflexion: 4+  /5 
 Ankle plantarflexion:  4+/5 RLE     Hip flexion:  5 /5 Knee extension:   5/5 Ankle dorsiflexion: 5  /5 Ankle plantarflexion:  5 /5 Sensory - decreased sensation to soft touch, pin prock, proprioception Left side. LUE>LLE limb ataxia.  
   
Skin:  Intact, dry, good skin turgor, age related changes present Edema: none Functional Assessment: 
   
   
Balance Sitting - Static: Good (unsupported) (02/08/19 1500) Sitting - Dynamic: Good (unsupported) (02/08/19 1500) Standing - Static: Fair (02/08/19 1500) Standing - Dynamic : Impaired (02/08/19 1500) [place] Fall Risk Assessment: 
[facility] Mobility: Ambulates or transfers with assist devices or assistance (02/09/19 0729) Mobility Interventions: Patient to call before getting OOB (02/09/19 0729) Mentation: Alert, oriented x 3 (02/09/19 0729) Medication: Patient receiving anticonvulsants, sedatives(tranquilizers), psychotropics or hypnotics, hypoglycemics, narcotics, sleep aids, antihypertensives, laxatives, or diuretics (02/09/19 0729) Medication Interventions: Evaluate medications/consider consulting pharmacy (02/09/19 5582) Elimination: Needs assistance with toileting (02/09/19 0729) Elimination Interventions: Call light in reach (02/09/19 0729) Prior Fall History: No (02/09/19 0729) Total Score: 3 (02/09/19 0729) Standard Fall Precautions: Yes (02/08/19 0720) High Fall Risk: Yes (02/09/19 0729) Speech Assessment: 
    
 
Ambulation: 
Gait Distance (ft): 400 Feet (ft) (02/08/19 1500) Assistive Device: (strap system for LLE DF assist and inhibit ankle inversion) (02/08/19 1500) Rail Use: Right  (02/08/19 1500) Labs/Studies: No results found for this or any previous visit (from the past 72 hour(s)). Assessment:  
 
Problem List as of 2/9/2019 Never Reviewed Codes Class Noted - Resolved Gait difficulty ICD-10-CM: R26.9 ICD-9-CM: 781.2  2/4/2019 - Present CVA (cerebral vascular accident) [facility]) ICD-10-CM: I63.9 ICD-9-CM: 434.91  2/4/2019 - Present Left hemiparesis ([address] ICD-10-CM: G81.94 
ICD-9-CM: 342.90  2/4/2019 - Present Sensory deficit, left ICD-10-CM: R41.89 ICD-9-CM: 780.99  2/4/2019 - Present Assessment/plan:  
 
Continue intensive Physical Therapy for a minimum of 1.5 hours a day, at least 5 out of 7 days per week to address bed mobility, transfers, ambulation, strengthening, balance, and endurance. Begin intensive Occupational Therapy for a minimum of 1.5 hours a day, at least 5 out of 7 days per week to address ADL ( bathing, LE dressing, toileting) and adaptive equipment as needed. - focus on left sided weakness, control deficit. - patient is fairly strong, but more limited by LUE, LLE dysmetria.  
  
The patient will also require 24-hour skilled rehabilitation nursing for bowel and bladder management, skin care for decubitus ulcer prevention , pain management and ongoing medication administration Continue daily physician medical management: CVA  - small acute early subacute infarction in the right thalamus. CTA  
- left  Hemiparesis 
- aspirin, statin BP  Control, risk factor reduction. - 2/5 LUE motor strength is fairly good, however he has poor control of his arm, more distally. PT using DF assist to improve RLE clearance, gait pattern. 2/6 - continue PT/OT efforts. No new barriers. LUE ataxia, decerased proprioception, LLE decreased proprioception are major barriers.  
 2/7 - L foot excessive inverting at times. Patient will need a device to assist left ankle for safer gait. continue medical management for CVA, secondary prevention. 2/8 - feels stronger per pt. Still significant LUE dysmetria, sensory deficit. Continue aspirin.  
  
Hypertension -  Acceptable control. -  Continue norvasc 5 daily - 2/9 SBP - 156, allow for permissive HTN 
   
Hypercholesterolemia - lipitor 80 
  
Smoker - continue nicoderm. - counseling.  
  
Pneumonia prophylaxis- Insentive spirometer every hour while awake 
  
DVT risk / DVT Prophylaxis-   
- no s/s of DVT. Contiue SCDs 
  
Potential urinary retention/ neurogenic bladder -   
-  continent. No signs of retention.  
  
bowel program - monitor.  
- + BM. Continent. Making good functional gains. Patient reports d/c home next week. Time spent was 15 minutes with over 1/2 in direct patient care/examination, consultation and coordination of care. Signed By: Shila Holstein, MD   
 February 9, 2019

## 2019-02-09 NOTE — PROGRESS NOTES
02/09/19 1200 Time Spent With Patient Time In 1030 Time Out 1125 Patient Seen For: AM;Other (see progress notes) Patient received from physical therapy. Patient completed UE bicycle x 5 minutes forward and 5 minutes backwards. Did not lose  with left UE. Needed several minutes break upon completion. Rice container completed for UE FM and sensation activity. Able to grasp items using pincer grasp x 50% of time. Stood at standing table x 12 minutes and x9 minutes while completing clothespin reaching activity. Lost balance x 2 when dropped clothespin. Needed reminder to lock brakes x 2 and use w/c to stand up safely. Patient was pleasant and cooperative. Demonstrating good memory and social appropriate conversation throughout. Continue POC. Zac Alarcon 2/9/2019

## 2019-02-09 NOTE — PROGRESS NOTES
Problem: Falls - Risk of 
Goal: *Absence of Falls Document Jaqueline Merino Fall Risk and appropriate interventions in the flowsheet. Outcome: Progressing Towards Goal 
Fall Risk Interventions: 
Mobility Interventions: Patient to call before getting OOB Medication Interventions: Evaluate medications/consider consulting pharmacy Elimination Interventions: Call light in reach Problem: Pressure Injury - Risk of 
Goal: *Prevention of pressure injury Document Eduardo Scale and appropriate interventions in the flowsheet. Outcome: Progressing Towards Goal 
Pressure Injury Interventions: 
Sensory Interventions: Assess changes in LOC Moisture Interventions: Absorbent underpads Activity Interventions: Increase time out of bed Mobility Interventions: HOB 30 degrees or less Nutrition Interventions: Document food/fluid/supplement intake Friction and Shear Interventions: HOB 30 degrees or less

## 2019-02-10 PROCEDURE — 65310000000 HC RM PRIVATE REHAB

## 2019-02-10 PROCEDURE — 74011250637 HC RX REV CODE- 250/637: Performed by: PHYSICAL MEDICINE & REHABILITATION

## 2019-02-10 RX ADMIN — ASPIRIN 81 MG: 81 TABLET, COATED ORAL at 09:02

## 2019-02-10 RX ADMIN — AMLODIPINE BESYLATE 5 MG: 5 TABLET ORAL at 09:02

## 2019-02-10 RX ADMIN — ATORVASTATIN CALCIUM 80 MG: 40 TABLET, FILM COATED ORAL at 22:14

## 2019-02-10 NOTE — PROGRESS NOTES
[facility] PROGRESS NOTE Chris Ross Admit Date: 2/4/2019 Admit Diagnosis: CVA (cerebral vascular accident) ([facility] [address] [I63.9]; Left hemiparesis ([facility] [address] [G81.94]; Sensory deficit, left [R41.89]; Gait difficulty [R26.9] Subjective  
 
Vss. Afebrile. Progressing well in PT/OT. No new barriers noted. Patient seen and examined. Good po intake. Denies pain, dizziness, palpations, SOB or nausea. Participating in therapy. Objective:  
 
Current Facility-Administered Medications Medication Dose Route Frequency  amLODIPine (NORVASC) tablet 5 mg  5 mg Oral DAILY  aspirin delayed-release tablet 81 mg  81 mg Oral DAILY  atorvastatin (LIPITOR) tablet 80 mg  80 mg Oral QHS  nicotine (NICODERM CQ) 21 mg/24 hr patch 1 Patch  1 Patch TransDERmal DAILY Review of Systems:   Denies chest pain, shortness of breath, cough, headache, visual problems, abdominal pain, dysurea, calf pain. Pertinent positives are as noted in the medical records and unremarkable otherwise. Visit Vitals /67 (BP 1 Location: Left arm, BP Patient Position: At rest) Pulse 86 Temp 98 °F (36.7 °C) Resp 18 SpO2 98% Physical Exam:  
    
General:   Alert, appears stated age. NAD. HEENT:  Normocephalic No JVD sitting. Lungs:  CTA Bilaterally, no wheezing. Heart:  Regular rate and rhythm, S1, S2, No murmur Abdomen:  Soft, non-tender to palpation in all four quadrants. Neuromuscular:  LUE     Shoulder abduction   4+/5 Elbow flexion:  4 +/5 Wrist extension:  4+/5 Finger flexion;  4 /5 
            ADMQ:   4/5 RUE    Shoulder abduction:  5/5 Elbow flexion:  5 /5 Wrist extension: 5 /5 Finger flexion:   5/5 
            ADMQ:  5 /5 LLE     Hip flexion:   4+/5 Knee extension:  4+ /5 Ankle dorsiflexion: 4+  /5 Ankle plantarflexion:  4+/5 RLE     Hip flexion:  5 /5 Knee extension:   5/5 Ankle dorsiflexion: 5  /5 Ankle plantarflexion:  5 /5 Sensory - decreased sensation to soft touch, pin prock, proprioception Left side. LUE>LLE limb ataxia.  
   
Skin:  Intact, dry, good skin turgor, age related changes present Edema: none Functional Assessment: 
Gross Assessment AROM: Generally decreased, functional (02/09/19 1300) Strength: Generally decreased, functional (02/09/19 1300) Coordination: Generally decreased, functional (02/09/19 1300) Balance Sitting - Static: Good (unsupported) (02/09/19 1300) Sitting - Dynamic: Good (unsupported) (02/09/19 1300) Standing - Static: Fair; Other (comment)(with SBA/CGA) (02/09/19 1300) Standing - Dynamic : Impaired (02/09/19 1300) Alyssa Herrera Fall Risk Assessment: 
[facility] Mobility: Ambulates or transfers with assist devices or assistance (02/09/19 2024) Mobility Interventions: Patient to call before getting OOB (02/09/19 2024) Mentation: Alert, oriented x 3 (02/09/19 2024) Medication: Patient receiving anticonvulsants, sedatives(tranquilizers), psychotropics or hypnotics, hypoglycemics, narcotics, sleep aids, antihypertensives, laxatives, or diuretics (02/09/19 2024) Medication Interventions: Evaluate medications/consider consulting pharmacy (02/09/19 2024) Elimination: Needs assistance with toileting (02/09/19 2024) Elimination Interventions: Call light in reach (02/09/19 2024) Prior Fall History: No (02/09/19 2024) Total Score: 3 (02/09/19 2024) Standard Fall Precautions: Yes (02/08/19 0720) High Fall Risk: Yes (02/09/19 2024) Speech Assessment: 
    
 
Ambulation: 
Gait Distance (ft): 400 Feet (ft)(with activities along route) (02/09/19 1300) Assistive Device: Cane, straight;Orthotic device(AFO) (02/09/19 1300) Rail Use: Both (02/09/19 1300) Labs/Studies: No results found for this or any previous visit (from the past 72 hour(s)). Assessment:  
 
Problem List as of 2/10/2019 Never Reviewed Codes Class Noted - Resolved Gait difficulty ICD-10-CM: R26.9 ICD-9-CM: 781.2  2/4/2019 - Present CVA (cerebral vascular accident) [facility]) ICD-10-CM: I63.9 ICD-9-CM: 434.91  2/4/2019 - Present Left hemiparesis ([address] ICD-10-CM: G81.94 
ICD-9-CM: 342.90  2/4/2019 - Present Sensory deficit, left ICD-10-CM: R41.89 ICD-9-CM: 780.99  2/4/2019 - Present Assessment/plan:  
 
Continue intensive Physical Therapy for a minimum of 1.5 hours a day, at least 5 out of 7 days per week to address bed mobility, transfers, ambulation, strengthening, balance, and endurance. Continue intensive Occupational Therapy for a minimum of 1.5 hours a day, at least 5 out of 7 days per week to address ADL ( bathing, LE dressing, toileting) and adaptive equipment as needed. - focus on left sided weakness, control deficit. - patient is fairly strong, but more limited by LUE, LLE dysmetria.  
  
Continue 24-hour skilled rehabilitation nursing for bowel and bladder management, skin care for decubitus ulcer prevention , pain management and ongoing medication administration Continue daily physician medical management: CVA  - small acute early subacute infarction in the right thalamus. CTA  
- left  Hemiparesis 
- aspirin, statin BP  Control, risk factor reduction. - 2/5 LUE motor strength is fairly good, however he has poor control of his arm, more distally. PT using DF assist to improve RLE clearance, gait pattern. 2/6 - continue PT/OT efforts. No new barriers. LUE ataxia, decerased proprioception, LLE decreased proprioception are major barriers.  
 2/7 - L foot excessive inverting at times. Patient will need a device to assist left ankle for safer gait. continue medical management for CVA, secondary prevention. 2/8 - feels stronger per pt. Still significant LUE dysmetria, sensory deficit. Continue aspirin.  
  
Hypertension -  Acceptable control.  
-  Continue norvasc 5 daily - 2/10 HR and BP acceptable  
   
Hypercholesterolemia - lipitor 80 
  
Smoker - continue nicoderm. - counseling.  
  
Pneumonia prophylaxis- Insentive spirometer every hour while awake 
  
DVT risk / DVT Prophylaxis-   
- no s/s of DVT. Contiue SCDs 
  
Potential urinary retention/ neurogenic bladder -   
-  continent. No signs of retention.  
  
bowel program - monitor.  
- + BM. Continent. Making good functional gains. Patient reports d/c home this week. Time spent was 15 minutes with over 1/2 in direct patient care/examination, consultation and coordination of care. Signed By: Susanne Poe MD   
 February 10, 2019

## 2019-02-11 LAB
ANION GAP SERPL CALC-SCNC: 7 MMOL/L (ref 7–16)
BUN SERPL-MCNC: 13 MG/DL (ref 8–23)
CALCIUM SERPL-MCNC: 8.7 MG/DL (ref 8.3–10.4)
CHLORIDE SERPL-SCNC: 109 MMOL/L (ref 98–107)
CO2 SERPL-SCNC: 27 MMOL/L (ref 21–32)
CREAT SERPL-MCNC: 0.77 MG/DL (ref 0.8–1.5)
GLUCOSE SERPL-MCNC: 86 MG/DL (ref 65–100)
MAGNESIUM SERPL-MCNC: 2.2 MG/DL (ref 1.8–2.4)
POTASSIUM SERPL-SCNC: 3.8 MMOL/L (ref 3.5–5.1)
SODIUM SERPL-SCNC: 143 MMOL/L (ref 136–145)

## 2019-02-11 PROCEDURE — 80048 BASIC METABOLIC PNL TOTAL CA: CPT

## 2019-02-11 PROCEDURE — 97116 GAIT TRAINING THERAPY: CPT

## 2019-02-11 PROCEDURE — 97530 THERAPEUTIC ACTIVITIES: CPT

## 2019-02-11 PROCEDURE — 97112 NEUROMUSCULAR REEDUCATION: CPT

## 2019-02-11 PROCEDURE — 65310000000 HC RM PRIVATE REHAB

## 2019-02-11 PROCEDURE — 36415 COLL VENOUS BLD VENIPUNCTURE: CPT

## 2019-02-11 PROCEDURE — 74011250637 HC RX REV CODE- 250/637: Performed by: PHYSICAL MEDICINE & REHABILITATION

## 2019-02-11 PROCEDURE — 83735 ASSAY OF MAGNESIUM: CPT

## 2019-02-11 PROCEDURE — 99232 SBSQ HOSP IP/OBS MODERATE 35: CPT | Performed by: PHYSICAL MEDICINE & REHABILITATION

## 2019-02-11 PROCEDURE — 97110 THERAPEUTIC EXERCISES: CPT

## 2019-02-11 PROCEDURE — 97535 SELF CARE MNGMENT TRAINING: CPT

## 2019-02-11 RX ADMIN — ASPIRIN 81 MG: 81 TABLET, COATED ORAL at 08:42

## 2019-02-11 RX ADMIN — AMLODIPINE BESYLATE 5 MG: 5 TABLET ORAL at 08:42

## 2019-02-11 RX ADMIN — ATORVASTATIN CALCIUM 80 MG: 40 TABLET, FILM COATED ORAL at 20:59

## 2019-02-11 NOTE — PROGRESS NOTES
Patient resting up in bed. Alert and oriented with pleasant affect. Lung sounds clear. S1S2, bowel sounds active. Continues with left sided weakness. Up to toilet with cane x 1 assist. Tolerated well. No complaint of pain or discomfort. Back to side of bed for breakfast tray. No other verbalized needs made known at present time. Assessment completed. See doc flow sheet for further assessments.

## 2019-02-11 NOTE — PROGRESS NOTES
OT Daily Note Time In 1220 Time Out 1435 Subjective: No complaints. Pain: none reported Education: benefits of rehab Interdisciplinary Communication: with PT regarding LOB earlier this morning and with functional transfer status Precautions: Other (comment)(falls) Location on arrival: handoff from PT Functional transfers Daily Assessment Patient transferred from supine to sit with modified independence and from mat to chair at the table x approximately 61' with CGA and one LOB requiring CGA to correct. Patient ambulated from gym to room x approximately 120' with CGA and one minor LOB when L toe did not clear the floor. Remains with decreased step clearance LLE which contributed to patient's anterior LOB. Neuro-Muscular Re-Education Daily Assessment Patient completed bilateral coordination task removing/replacing lids on approximately 20 containers, with focus on LUE coordination and bilateral coordination during functional task. Patient completed bilateral coordination task generating sets of nuts/bolts/washers with focus on fine motor coordination and bilateral coordination. Completed 5 sets. Would benefit from further neuro re-education efforts. Cognition Daily Assessment Orientation: alert and oriented x 4 Expression: able to express needs verbally Comprehension: understands basic instructions, no assist with complex instructions Social Interaction: cooperating, appropriate with OT, participating, motivated to improve Problem Solving: solves routine problems, minimal assist with complex problems Memory: recalls people frequently seen, able to complete 3/3 step commands Patient was left seated up in recliner with call light/all needs in reach and in no distress. Assessment: Progressing well. On track. Remains a high fall risk.   
Plan: Continue OT POC with focus on ADL/IADL skills, functional transfers, neuro re-education, cognition, functional mobility, coordination, strength, static and dynamic balance, and activity tolerance to maximize safety and independence with ADLs and functional transfers. Kristin Deutsch MS, OTR/L 
2/11/2019 Note may contain the following abbreviations:  
Abbreviation Explanation AROM Active range of motion PROM Passive range of motion SPT Stand pivot transfer LPT Lateral pivot transfer WC wheelchair RW Rolling walker BUE Bilateral upper extremities BLE Bilateral lower extremities WBAT Weight bearing as tolerated TTWB Toe-touch weight bearing AD Adaptive device AE Adaptive equipment NMES Neuro muscular electrical stimulation UBE Upper body ergometer

## 2019-02-11 NOTE — PROGRESS NOTES
SFD PROGRESS NOTE Yamilet Montemayor Admit Date: 2/4/2019 Admit Diagnosis: CVA (cerebral vascular accident) ([facility] [address] [I63.9]; Left hemiparesis ([facility] [address] [G81.94]; Sensory deficit, left [R41.89]; Gait difficulty [R26.9] Subjective  
 
Vss. Afebrile. R dorsiflex assist ankle orthosis delivered and helping to clear foot. Patient antcipating sfae discharge in2 days. Continues to make excellent progress, PT/OT. Objective:  
 
Current Facility-Administered Medications Medication Dose Route Frequency  amLODIPine (NORVASC) tablet 5 mg  5 mg Oral DAILY  aspirin delayed-release tablet 81 mg  81 mg Oral DAILY  atorvastatin (LIPITOR) tablet 80 mg  80 mg Oral QHS  nicotine (NICODERM CQ) 21 mg/24 hr patch 1 Patch  1 Patch TransDERmal DAILY Review of Systems:   Denies chest pain, shortness of breath, cough, headache, visual problems, abdominal pain, dysurea, calf pain. Pertinent positives are as noted in the medical records and unremarkable otherwise. Visit Vitals /81 (BP 1 Location: Left arm, BP Patient Position: At rest) Pulse 86 Temp 98.1 °F (36.7 °C) Resp 16 SpO2 97% Physical Exam:  
    
General:   Alert, appears stated age. NAD. HEENT:  Normocephalic No JVD sitting. Lungs:  CTA. Heart:  Regular rate and rhythm, S1, S2, No murmur Genitourinary: defered Abdomen:  Soft, non-tender to palpation in all four quadrants. Neuromuscular:  PERRL, EOMI 
LUE     Shoulder abduction   5-/5 Elbow flexion:  5-/5 Wrist extension:  4+/5 Finger flexion;  4+/5 ADMQ:   4/5 RUE    Shoulder abduction:  5/5 Elbow flexion:  5 /5 Wrist extension: 5 /5 Finger flexion:   5/5 
            ADMQ:  5 /5 LLE     Hip flexion:   4+/5 Knee extension:  5- /5 Ankle dorsiflexion: 4+  /5 Ankle plantarflexion:  4+/5 RLE     Hip flexion:  5 /5 Knee extension:   5/5 Ankle dorsiflexion: 5  /5 Ankle plantarflexion:  5 /5 Sensory - decreased sensation to soft touch, pin prock, proprioception Left side. LUE>LLE limb ataxia.  
   
Skin:  Intact, dry, good skin turgor, age related changes present Edema: none Functional Assessment: 
Gross Assessment AROM: Generally decreased, functional (02/09/19 1300) Strength: Generally decreased, functional (02/09/19 1300) Coordination: Generally decreased, functional (02/09/19 1300) Balance Sitting - Static: Good (unsupported) (02/09/19 1300) Sitting - Dynamic: Good (unsupported) (02/09/19 1300) Standing - Static: Fair; Other (comment)(with SBA/CGA) (02/09/19 1300) Standing - Dynamic : Impaired (02/09/19 1300) Gena Avina Fall Risk Assessment: 
Shea Aj Mobility: Ambulates or transfers with assist devices or assistance (02/10/19 2007) Mobility Interventions: Utilize walker, cane, or other assistive device (02/10/19 2007) Mentation: Alert, oriented x 3 (02/10/19 2007) Medication: Patient receiving anticonvulsants, sedatives(tranquilizers), psychotropics or hypnotics, hypoglycemics, narcotics, sleep aids, antihypertensives, laxatives, or diuretics (02/10/19 2007) Medication Interventions: Bed/chair exit alarm (02/10/19 2007) Elimination: Needs assistance with toileting (02/10/19 2007) Elimination Interventions: Call light in reach (02/10/19 2007) Prior Fall History: No (02/10/19 2007) Total Score: 3 (02/10/19 2007) Standard Fall Precautions: Yes (02/08/19 0720) High Fall Risk: Yes (02/10/19 2007) Speech Assessment: 
    
 
Ambulation: 
Gait Distance (ft): 400 Feet (ft)(with activities along route) (02/09/19 1300) Assistive Device: Cane, straight;Orthotic device(AFO) (02/09/19 1300) Rail Use: Both (02/09/19 1300) Labs/Studies: 
Recent Results (from the past 72 hour(s)) METABOLIC PANEL, BASIC Collection Time: 02/11/19  7:29 AM  
Result Value Ref Range Sodium 143 136 - 145 mmol/L Potassium 3.8 3.5 - 5.1 mmol/L Chloride 109 (H) 98 - 107 mmol/L  
 CO2 27 21 - 32 mmol/L Anion gap 7 7 - 16 mmol/L Glucose 86 65 - 100 mg/dL BUN 13 8 - 23 MG/DL Creatinine 0.77 (L) 0.8 - 1.5 MG/DL  
 GFR est AA >60 >60 ml/min/1.73m2 GFR est non-AA >60 >60 ml/min/1.73m2 Calcium 8.7 8.3 - 10.4 MG/DL MAGNESIUM Collection Time: 02/11/19  7:29 AM  
Result Value Ref Range Magnesium 2.2 1.8 - 2.4 mg/dL Assessment:  
 
Problem List as of 2/11/2019 Never Reviewed Codes Class Noted - Resolved Gait difficulty ICD-10-CM: R26.9 ICD-9-CM: 781.2  2/4/2019 - Present CVA (cerebral vascular accident) [facility]) ICD-10-CM: I63.9 ICD-9-CM: 434.91  2/4/2019 - Present Left hemiparesis ([facility] ICD-10-CM: G81.94 
ICD-9-CM: 342.90  2/4/2019 - Present Sensory deficit, left ICD-10-CM: R41.89 ICD-9-CM: 780.99  2/4/2019 - Present Assessment/plan:  
 
Continue intensive Physical Therapy for a minimum of 1.5 hours a day, at least 5 out of 7 days per week to address bed mobility, transfers, ambulation, strengthening, balance, and endurance. Begin intensive Occupational Therapy for a minimum of 1.5 hours a day, at least 5 out of 7 days per week to address ADL ( bathing, LE dressing, toileting) and adaptive equipment as needed. - focus on left sided weakness, control deficit. - patient is fairly strong, but more limited by LUE, LLE dysmetria.  
  
The patient will also require 24-hour skilled rehabilitation nursing for bowel and bladder management, skin care for decubitus ulcer prevention , pain management and ongoing medication administration Continue daily physician medical management: CVA  - small acute early subacute infarction in the right thalamus. CTA  
- left  Hemiparesis - aspirin, statin BP  Control, risk factor reduction. - 2/5 LUE motor strength is fairly good, however he has poor control of his arm, more distally. PT using DF assist to improve RLE clearance, gait pattern. 2/6 - continue PT/OT efforts. No new barriers. LUE ataxia, decerased proprioception, LLE decreased proprioception are major barriers.  
 2/7 - L foot excessive inverting at times. Patient will need a device to assist left ankle for safer gait. continue medical management for CVA, secondary prevention. 2/8 - feels stronger per pt. Still significant LUE dysmetria, sensory deficit. Continue aspirin. 2/11 - continue acuet stroke treatment. Secondary prevention.   
  
Hypertension -  Acceptable control.  
-  Continue norvasc 5 daily - 2/11 - -150s. Acceptable.  
  
  
Hypercholesterolemia - lipitor 80 
  
Smoker - continue nicoderm. - counceling.  
  
Pneumonia prophylaxis- Insentive spirometer every hour while awake 
  
DVT risk / DVT Prophylaxis-   
- 2/11 no s/s of DVT. - Contiue SCDs 
  
Potential urinary retention/ neurogenic bladder -   
-  Voiding well. Pt is continent. No signs of retention.  
  
bowel program - monitor.  
- + BM. Continent. Time spent was 25 minutes with over 1/2 in direct patient care/examination, consultation and coordination of care. Signed By: Randy Logan MD   
 February 11, 2019

## 2019-02-11 NOTE — PROGRESS NOTES
Problem: Falls - Risk of 
Goal: *Absence of Falls Document [place] Rank Fall Risk and appropriate interventions in the flowsheet. Outcome: Progressing Towards Goal 
Fall Risk Interventions: 
Mobility Interventions: OT consult for ADLs, Patient to call before getting OOB, PT Consult for mobility concerns, PT Consult for assist device competence, Communicate number of staff needed for ambulation/transfer, Utilize walker, cane, or other assistive device Medication Interventions: Patient to call before getting OOB, Evaluate medications/consider consulting pharmacy Elimination Interventions: Call light in reach, Patient to call for help with toileting needs

## 2019-02-11 NOTE — PROGRESS NOTES
PHYSICAL THERAPY DAILY NOTE Time In: 1300 Time Out: 5981 Patient Seen For: PM;Other (see progress notes); Transfer training;Gait training; Therapeutic exercise Subjective: Patient had no complaints. Objective: No pain noted. Other (comment)(fall precautions) GROSS ASSESSMENT Daily Assessment COGNITION Daily Assessment  
 intact BED/MAT MOBILITY Daily Assessment Supine to Sit : 5 (Supervision) Sit to Supine : 6 (Modified independent) TRANSFERS Daily Assessment Transfer Type: Other Other: SPT with straight cane Transfer Assistance : 5 (Stand-by assistance) Sit to Stand Assistance: Contact guard assistance Car Transfers: Not tested GAIT Daily Assessment Amount of Assistance: 5 (Stand-by assistance) Distance (ft): 200 Feet (ft) Assistive Device: Cane, straight;Brace/Splint(brace to assist DF) STEPS or STAIRS Daily Assessment Steps/Stairs Ambulated (#): 4 Level of Assist : 5 (Stand-by assistance) Rail Use: Right BALANCE Daily Assessment WHEELCHAIR MOBILITY Daily Assessment LOWER EXTREMITY EXERCISES Daily Assessment Extremity: Both Exercise Type #1: Supine lower extremity strengthening Sets Performed: 1 Reps Performed: 20 Level of Assist: Supervision Exercise Type #2: (obstacle course with gait) Sets Performed: 1 Reps Performed: 10 Level of Assist: Stand-by assistance Assessment: Patient making good progress. but doesn't realize all his deficits. Plan of Care: Continue with plan of care. Will work on getting up/ down floor. Jozef Ndiaye, PTA 
2/11/2019

## 2019-02-11 NOTE — PROGRESS NOTES
Problem: Falls - Risk of 
Goal: *Absence of Falls Document Genamarii Avina Fall Risk and appropriate interventions in the flowsheet. Outcome: Progressing Towards Goal 
Fall Risk Interventions: 
Mobility Interventions: Utilize walker, cane, or other assistive device Medication Interventions: Bed/chair exit alarm Elimination Interventions: Call light in reach Problem: Pressure Injury - Risk of 
Goal: *Prevention of pressure injury Document Eduardo Scale and appropriate interventions in the flowsheet. Pressure Injury Interventions: 
Sensory Interventions: Assess changes in LOC Moisture Interventions: Absorbent underpads Activity Interventions: Increase time out of bed Mobility Interventions: HOB 30 degrees or less Nutrition Interventions: Document food/fluid/supplement intake Friction and Shear Interventions: HOB 30 degrees or less

## 2019-02-11 NOTE — PROGRESS NOTES
PHYSICAL THERAPY DAILY NOTE Time In: 8405 Time Out: 1004 Patient Seen For: AM;Other (see progress notes); Transfer training;Gait training; Therapeutic exercise Subjective: Patient had no complaints. Objective: No pain noted. Other (comment)(fall precautions) GROSS ASSESSMENT Daily Assessment COGNITION Daily Assessment BED/MAT MOBILITY Daily Assessment Supine to Sit : 5 (Supervision) Sit to Supine : 6 (Modified independent) TRANSFERS Daily Assessment Transfer Type: SPT without device Transfer Assistance : 5 (Stand-by assistance) Sit to Stand Assistance: Stand-by assistance Car Transfers: Not tested GAIT Daily Assessment Amount of Assistance: 5 (Stand-by assistance) Assistive Device: Other (comment); Brace/Splint(no device wears brace to assist DF on left) STEPS or STAIRS Daily Assessment Level of Assist : 0 (Not tested) BALANCE Daily Assessment WHEELCHAIR MOBILITY Daily Assessment LOWER EXTREMITY EXERCISES Daily Assessment Extremity: Both Exercise Type #1: Other (comment)(nustep x 10 minutes) Sets Performed: 1 Reps Performed: 10 Level of Assist: Supervision Exercise Type #2: (obstacle course with gait) Sets Performed: 1 Reps Performed: 10 Level of Assist: Stand-by assistance Assessment: Patient making good progress. Plan of Care: Continue with plan of care to reach PT goals. Attempting gait with and without assistive device. Several losses of balance when turning but he recovers with verbal cues to slow down. Santos Littlejohn, PTA 
2/11/2019

## 2019-02-11 NOTE — PROGRESS NOTES
OT Daily Note Time In 1129 Time Out 1200 Subjective: \"Sure, I'll come on out. \"  
Pain: none reported Education: benefits of rehab Interdisciplinary Communication: nurse aware that patient out to/back from therapy Precautions: Other (comment)(falls) Location on arrival: up in recliner Transfers Daily Assessment Patient transferred recliner to [facility] using SPT with straight cane with LOB to R and L, requiring min A to correct. Educated patient on technique of standing up, standing still until regained balance, and then initiating stepping for impulse control and improved safety. Verbalized and demonstrated understanding. Patient completed transfer from [facility] to [facility] using technique of standing and resting first with much improved balance and CGA, with no LOB. Transfers remain impaired this date. Multiple LOB requiring CGA to min a to correct. Limited by impulsivity. Neuro-Muscular Re-Education Daily Assessment Patient completed LUE motor control exercise using different vertical heights and small rings with LUE only, with 2 pound weight donned on LUE, working on controlled arom LUE. Completed 10 x 10 sets. Progressing. Would benefit from further neuro re-education efforts. Cognition Daily Assessment Orientation: alert and oriented x 4 Expression: able to express needs verbally Comprehension: understands basic instructions, minimal assist with complex instructions Social Interaction: cooperating, appropriate with OT, participating, motivated to improve Problem Solving: solves routine problems, minimal assist with complex problems. Impulsive. Memory: recalls people frequently seen, able to complete 2/3 step commands. Impulsive. Patient was left seated up in recliner with call light/all needs in reach and in no distress. Assessment: Remains impulsive with transfers. Remains with impaired balance. Remains high fall risk. Plan: Continue OT POC with focus on ADL/IADL skills, neuro re-education, impulse control, functional transfers, functional mobility, coordination, strength, static and dynamic balance, and activity tolerance to maximize safety and independence with ADLs and functional transfers. Jimmy Gallego MS, OTR/L 
2/11/2019 Note may contain the following abbreviations:  
Abbreviation Explanation AROM Active range of motion PROM Passive range of motion SPT Stand pivot transfer LPT Lateral pivot transfer WC wheelchair RW Rolling walker BUE Bilateral upper extremities BLE Bilateral lower extremities WBAT Weight bearing as tolerated TTWB Toe-touch weight bearing AD Adaptive device AE Adaptive equipment NMES Neuro muscular electrical stimulation UBE Upper body ergometer

## 2019-02-11 NOTE — PROGRESS NOTES
Time In 0805 Time Out 0915 Mobility Score Comments Supine to Sit 5 (Supervision) Hospital Bed Transfer Assist 4 Transfer Type: Other Comments: SPT with cane Activities of Daily Living  Score Comments Grooming 5 Tasks completed by patient: Brushed hair, Brushed teeth, Washed face, Washed hands Comments: Set Up Bathing 4 Body Parts Bathe: Abdomen, Arm, right, Arm, left, Buttocks, Chest, Lower leg and foot, left, Lower leg and foot, right, Samantha area, Thigh, left, Thigh, right Comments: A for steadying when standing Tub/Shower Transfer [place] 3 Type of Shower: Shower Adaptive  Equipment:Tub transfer bench, Grab bars and [place] Comments: Ax2 for steadying Upper Body Dressing/ 
Undressing 4 Items Applied:Pullover (4 steps) Comments: Steadying A when gathering clothes Lower Body Dressing/ 
Undressing 5 Items Applied:Elastic waist pants (3 steps), Fasten shoe (1 step), Shoe, left (1 step), Shoe, right (1 step), Sock, left (1 step), Sock, right (1 step), Underpants (3 steps) Adaptive Equipment: [place] Comments: Steadying A when gathering clothes Education  Pt was educated on safety awareness during transfers. Reviewed stroke education and provided handout on stroke risks as related to alcohol consumption. Precautions: L [place]; falls Home: one story house with spouse; walk-in shower 
 
 Modified Box and Blocks Left Right  
2/5/19 18 blocks/min 35 blocks/min  
  
9 Hole Peg Test Left Right  
2/5/19 101.72 seconds 37.65 seconds  
  
Pt was in bed and agreeable to tx. Pt's performance with ADL is reflected in above chart. Pt gathered clothes using cane and steadying assist. Pt demonstrated impulsivity during transfers and throughout session, with multiple LOB requiring min to moderate assist to correct.  Pt was given moderate verbal cues to slow down and was educated on avoiding doing tasks that involve standing on one foot; however, patient persisted with attempt to stand on one foot to dry feet. Required maximum verbal and moderate tactile cues to sit to dry feet. Of note, patient lost balance every time he attempted to stand on one foot and required moderate assist to maintain balance. When discussing losses of balance, patient reported feeling \"uneasy and anxious\" this morning secondary to not resting well yesterday. Pt was educated on safety awareness and hand placement during transfers. Pt ambulated approximately 6 ft to [facility] and was taken to the gym. Pt engaged with [facility] activity that involved sorting marbles into containers of various colors. Pt demonstrated good pincer grasp, coordination, and forward reaching with LUE. Pt completed the task with good performance. During this activity, pt discussed the stroke education packet and was provided handout on stroke risks as related to alcohol consumption. He verbalized strategies to reduce his risk of another stroke. Pt also said he plans to \"stop smoking cold turkey\" and \"be more aware\" of alcohol consumption. Pt completed the following exercises with LUE to promote UB strength,  AROM, activity tolerance, and shoulder stabilization for integration into ADL: 
Exercise Sets Reps Comments Scapular Retraction/Protraction 1 10 Scapular Elevation/Depression 1 10 Abduction with Elbow Flexion 1 10 LUE hand positioned in a fist to gain more AROM Arm Circles 1 20 1/2 Clockwise, 1/2 Counterclockwise Pt was left awaiting PT. Continue with POC. Assessment: Very impulsive this session with limited insight into balance deficits. Session with multiple losses of balance that patient required therapist assist to correct. Remains a high fall risk. Sofia Zarate OTS 
2/11/2019 Addended and co-signed by Jayden Godoy MS, OTR/L 
2/11/2019

## 2019-02-12 PROCEDURE — 74011250637 HC RX REV CODE- 250/637: Performed by: PHYSICAL MEDICINE & REHABILITATION

## 2019-02-12 PROCEDURE — 97530 THERAPEUTIC ACTIVITIES: CPT

## 2019-02-12 PROCEDURE — 65310000000 HC RM PRIVATE REHAB

## 2019-02-12 PROCEDURE — 96151 PR HEAL & BEHAV ASSESS,EA 15 MIN,RE-ASSESS: CPT | Performed by: PSYCHOLOGIST

## 2019-02-12 PROCEDURE — 97116 GAIT TRAINING THERAPY: CPT

## 2019-02-12 PROCEDURE — 99232 SBSQ HOSP IP/OBS MODERATE 35: CPT | Performed by: PHYSICAL MEDICINE & REHABILITATION

## 2019-02-12 PROCEDURE — 97535 SELF CARE MNGMENT TRAINING: CPT

## 2019-02-12 PROCEDURE — 97110 THERAPEUTIC EXERCISES: CPT

## 2019-02-12 RX ADMIN — ATORVASTATIN CALCIUM 80 MG: 40 TABLET, FILM COATED ORAL at 21:05

## 2019-02-12 RX ADMIN — AMLODIPINE BESYLATE 5 MG: 5 TABLET ORAL at 08:22

## 2019-02-12 RX ADMIN — ASPIRIN 81 MG: 81 TABLET, COATED ORAL at 08:22

## 2019-02-12 NOTE — PROGRESS NOTES
PHYSICAL THERAPY DAILY NOTE Time In: 8011 Time Out: 0511 Patient Seen For: AM;Other (see progress notes); Transfer training;Gait training; Therapeutic exercise Subjective: Patient s wife present. for training. Objective: No pain noted. Wife seemed to understand the importance of being SBA level . Patient did loose his balance with wife present and received using straight cane. Other (comment)(fall precautions) GROSS ASSESSMENT Daily Assessment COGNITION Daily Assessment  
 intact BED/MAT MOBILITY Daily Assessment Supine to Sit : 5 (Supervision) Sit to Supine : 6 (Modified independent) TRANSFERS Daily Assessment Other: SPT with straight cane Transfer Assistance : 5 (Stand-by assistance) Sit to Stand Assistance: Contact guard assistance GAIT Daily Assessment Amount of Assistance: 5 (Stand-by assistance) Distance (ft): 200 Feet (ft) Assistive Device: Naomi Jointer, straight;Brace/Splint STEPS or STAIRS Daily Assessment Steps/Stairs Ambulated (#): 4 Level of Assist : 5 (Stand-by assistance) Rail Use: Right BALANCE Daily Assessment WHEELCHAIR MOBILITY Daily Assessment LOWER EXTREMITY EXERCISES Daily Assessment Worked with wife getting up/ down off floor with SBA. Assessment: Patient making progress but still doesn't realize all his deficits. Plan of Care: Continue with plan of care . Will follow up with outtpatient PT at discharge. Iman Kim, PTA 
2/12/2019

## 2019-02-12 NOTE — PROGRESS NOTES
PSYCHOLOGY PROGRESS NOTE Name:  Lay Carrington Date of Service: 2/12/2019 Location of Service:   908/01 Type of Service: Health & Behavior - Re-Assessment Duration:  15 Primary Diagnosis: 1. Cerebrovascular accident (CVA), unspecified mechanism ([facility] [address] 2. Gait difficulty 3. Left hemiparesis ([facility] [address] 4. Sensory deficit, left Summary of Service:  Engaged in re-screening with patient as well as his wife present. Reflected on progress made during rehab stay as discharge is pending. Mood bright, no difficulties. Positive outlook on discharge. Wife verbalized no difficulties. Abdiel Lal, Gonzalo.  
Psychologist

## 2019-02-12 NOTE — PROGRESS NOTES
Problem: Falls - Risk of 
Goal: *Absence of Falls Document Skye Carrizales Fall Risk and appropriate interventions in the flowsheet. Outcome: Progressing Towards Goal 
Fall Risk Interventions: 
Mobility Interventions: Patient to call before getting OOB Medication Interventions: Patient to call before getting OOB Elimination Interventions: Call light in reach

## 2019-02-12 NOTE — PROGRESS NOTES
SFD PROGRESS NOTE Mina Dakin Admit Date: 2/4/2019 Admit Diagnosis: CVA (cerebral vascular accident) ([facility] [address] [I63.9]; Left hemiparesis ([facility] [address] [G81.94]; Sensory deficit, left [R41.89]; Gait difficulty [R26.9] Subjective  
 
Vss. Afebrile. Case discussed in team conference. Still demonstrates LOB during therapy. Family training today done with wife. Continues to make steady functional gains. Anticipate safe home discharge tomorrow with [facility] PT/OT. Objective:  
 
Current Facility-Administered Medications Medication Dose Route Frequency  amLODIPine (NORVASC) tablet 5 mg  5 mg Oral DAILY  aspirin delayed-release tablet 81 mg  81 mg Oral DAILY  atorvastatin (LIPITOR) tablet 80 mg  80 mg Oral QHS  nicotine (NICODERM CQ) 21 mg/24 hr patch 1 Patch  1 Patch TransDERmal DAILY Review of Systems:   Denies chest pain, shortness of breath, cough, headache, visual problems, abdominal pain, dysurea, calf pain. Pertinent positives are as noted in the medical records and unremarkable otherwise. Visit Vitals /82 Pulse 79 Temp 98.3 °F (36.8 °C) Resp 16 SpO2 96% Physical Exam:  
    
General:   Alert, appears stated age. NAD. HEENT:  Normocephalic No JVD sitting. Lungs:  CTA. Heart:  Regular rate and rhythm, S1, S2, No murmur Genitourinary: defered Abdomen:  Soft, non-tender to palpation in all four quadrants. Neuromuscular:  PERRL, EOMI 
LUE     Shoulder abduction   5-/5 Elbow flexion:  5-/5 Wrist extension:  4+/5 Finger flexion;  4+/5 RUE    Shoulder abduction:  5/5 Elbow flexion:  5 /5 Wrist extension: 5 /5 Finger flexion:   5/5 LLE     Hip flexion:   4+/5 Knee extension:  5- /5 Ankle dorsiflexion: 4+  /5 Ankle plantarflexion:  5-+/5                                    
RLE     Hip flexion:  5 /5             Knee extension:   5/5  
 Ankle dorsiflexion: 5  /5 Ankle plantarflexion:  5 /5 Sensory - decreased sensation to soft touch, pin prock, proprioception Left side. LUE>LLE limb ataxia.  
   
Skin:  Intact, dry, good skin turgor, age related changes present Edema: none Functional Assessment: 
Gross Assessment AROM: Generally decreased, functional (02/09/19 1300) Strength: Generally decreased, functional (02/09/19 1300) Coordination: Generally decreased, functional (02/09/19 1300) Balance Sitting - Static: Good (unsupported) (02/09/19 1300) Sitting - Dynamic: Good (unsupported) (02/09/19 1300) Standing - Static: Fair; Other (comment)(with SBA/CGA) (02/09/19 1300) Standing - Dynamic : Impaired (02/09/19 1300) Arron Alexis Fall Risk Assessment: 
Kurtis Mccollum Risk Mobility: Ambulates or transfers with assist devices or assistance (02/11/19 1903) Mobility Interventions: Patient to call before getting OOB (02/11/19 1903) Mentation: Alert, oriented x 3 (02/11/19 1903) Medication: Patient receiving anticonvulsants, sedatives(tranquilizers), psychotropics or hypnotics, hypoglycemics, narcotics, sleep aids, antihypertensives, laxatives, or diuretics (02/11/19 1903) Medication Interventions: Patient to call before getting OOB (02/11/19 1903) Elimination: Needs assistance with toileting (02/11/19 1903) Elimination Interventions: Call light in reach (02/11/19 1903) Prior Fall History: No (02/11/19 1903) Total Score: 3 (02/11/19 1903) Standard Fall Precautions: Yes (02/11/19 1903) High Fall Risk: Yes (02/10/19 2007) Speech Assessment: 
    
 
Ambulation: 
Gait Distance (ft): 200 Feet (ft) (02/11/19 1559) Assistive Device: Cane, straight;Brace/Splint(brace to assist DF) (02/11/19 1559) Rail Use: Right  (02/11/19 1559) Labs/Studies: 
Recent Results (from the past 72 hour(s)) METABOLIC PANEL, BASIC  
 Collection Time: 02/11/19  7:29 AM  
Result Value Ref Range Sodium 143 136 - 145 mmol/L Potassium 3.8 3.5 - 5.1 mmol/L Chloride 109 (H) 98 - 107 mmol/L  
 CO2 27 21 - 32 mmol/L Anion gap 7 7 - 16 mmol/L Glucose 86 65 - 100 mg/dL BUN 13 8 - 23 MG/DL Creatinine 0.77 (L) 0.8 - 1.5 MG/DL  
 GFR est AA >60 >60 ml/min/1.73m2 GFR est non-AA >60 >60 ml/min/1.73m2 Calcium 8.7 8.3 - 10.4 MG/DL MAGNESIUM Collection Time: 02/11/19  7:29 AM  
Result Value Ref Range Magnesium 2.2 1.8 - 2.4 mg/dL Assessment:  
 
Problem List as of 2/12/2019 Never Reviewed Codes Class Noted - Resolved Gait difficulty ICD-10-CM: R26.9 ICD-9-CM: 781.2  2/4/2019 - Present CVA (cerebral vascular accident) [facility]) ICD-10-CM: I63.9 ICD-9-CM: 434.91  2/4/2019 - Present Left hemiparesis ([facility] [address] ICD-10-CM: G81.94 
ICD-9-CM: 342.90  2/4/2019 - Present Sensory deficit, left ICD-10-CM: R41.89 ICD-9-CM: 780.99  2/4/2019 - Present Assessment/plan:  
 
Continue intensive Physical Therapy for a minimum of 1.5 hours a day, at least 5 out of 7 days per week to address bed mobility, transfers, ambulation, strengthening, balance, and endurance. Begin intensive Occupational Therapy for a minimum of 1.5 hours a day, at least 5 out of 7 days per week to address ADL ( bathing, LE dressing, toileting) and adaptive equipment as needed. - focus on left sided weakness, control deficit. - patient is fairly strong, but more limited by LUE, LLE dysmetria.  
-+ LOB occasionally.  
  
The patient will also require 24-hour skilled rehabilitation nursing for bowel and bladder management, skin care for decubitus ulcer prevention , pain management and ongoing medication administration Continue daily physician medical management: CVA  - small acute early subacute infarction in the right thalamus. CTA  
- left  Hemiparesis 
- aspirin, statin BP  Control, risk factor reduction. - 2/5 LUE motor strength is fairly good, however he has poor control of his arm, more distally. PT using DF assist to improve RLE clearance, gait pattern. 2/6 - continue PT/OT efforts. No new barriers. LUE ataxia, decerased proprioception, LLE decreased proprioception are major barriers.  
 2/7 - L foot excessive inverting at times. Patient will need a device to assist left ankle for safer gait. continue medical management for CVA, secondary prevention. 2/8 - feels stronger per pt. Still significant LUE dysmetria, sensory deficit. Continue aspirin. 2/11 - continue acuet stroke treatment. Secondary prevention. 2/12 - pt has reached his short term rehab goals. Anticipate home discharge with wife tomorrow.  
  
Hypertension -  Acceptable control.  
-  Continue norvasc 5 daily - 2/11 - -150s. Acceptable. 2/12 - Pt will discharge on norvasc 5.  
  
  
Hypercholesterolemia - lipitor 80 
  
Smoker - continue nicoderm. - counceling.  
  
DVT risk / DVT Prophylaxis-   
- 2/12- Contiue SCDs. No s/s of DVT.   
Potential urinary retention/ neurogenic bladder -   
-  Bladder control intact.  
  
bowel program - monitor.  
-  Continent. Time spent was 25 minutes with over 1/2 in direct patient care/examination, consultation and coordination of care. Signed By: Joshua Banegas MD   
 February 12, 2019

## 2019-02-12 NOTE — PROGRESS NOTES
OT Daily Note Time In 1304 Time Out 1346 Subjective: \"I'm doing okay. \"  
Pain: none reported Education: home management techniques in kitchen, fall precautions and risks, use of LUE during functional tasks Interdisciplinary Communication: with PT and physician Dr. Radha Jade regarding recommendation of NO driving until cleared by driving rehab therapist  
Precautions: Other (comment)(falls) Location on arrival: up at OT table Self-Care/IADL Daily Assessment Patient completed simulated kitchen tasks in practice kitchen with CGA to SBA, with focus on reaching to low and high surfaces, stabilizing on solid surfaces, and technique for managing cane while also carrying an object. Patient was able to retrieve a cup using LUE and make a cup of ice water with SBA using BUE. Patient completed simulation for managing dishes into  with verbal cueing for stabilizing on counter with LUE. Educated patient in using solid surfaces to stabilize when reaching to low surfaces with demonstrated understanding. Patient retrieved an item from the fridge and was able to manage the microwave after 1 verbal cue. Patient completed functional mobility and reaching task in [employer] using single point cane for part of session and without AE for part of session, with focus on following a written grocery list to retrieve items. Patient retrieved 95% of items using LUE and did use RUE for one heavy item as instructed by therapist. Patient experienced one LOB to anterior requiring min A to correct when patient was attempting to retrieve an item and therapist spoke to patient. Patient totaled the items on the list (approximately 20 items) with one error that patient self corrected. Balance is more impaired and fall risk is higher when patient is attempted to divide attention.  Continue to recommend SBA during functional mobility at discharge, especially in smaller spaces and when patient is attempting to do a task at the same time as ambulation. Cognition Daily Assessment Orientation: alert and oriented x 4 Expression: able to express needs verbally Comprehension: understands basic instructions, minimal assist with complex instructions Social Interaction: cooperating, appropriate with OT, participating, motivated to improve Problem Solving: solves routine problems, minimal assist with complex problems Memory: recalls people frequently seen, able to complete 3/3 step commands  Impaired divided attention contributes to fall risk. Handoff to PT. Assessment: Continue to recommend NO driving until cleared by driving rehab specialist.  
Plan: DC to home tomorrow with supervision/assist from wife. Kristin Deutsch MS, OTR/L 
2/12/2019 Note may contain the following abbreviations:  
Abbreviation Explanation AROM Active range of motion PROM Passive range of motion SPT Stand pivot transfer LPT Lateral pivot transfer WC wheelchair RW Rolling walker BUE Bilateral upper extremities BLE Bilateral lower extremities WBAT Weight bearing as tolerated TTWB Toe-touch weight bearing AD Adaptive device AE Adaptive equipment NMES Neuro muscular electrical stimulation UBE Upper body ergometer

## 2019-02-12 NOTE — PROGRESS NOTES
Problem: Falls - Risk of 
Goal: *Absence of Falls Document Salena Okeefe Fall Risk and appropriate interventions in the flowsheet. Outcome: Progressing Towards Goal 
Fall Risk Interventions: 
Mobility Interventions: Patient to call before getting OOB Medication Interventions: Patient to call before getting OOB Elimination Interventions: Patient to call for help with toileting needs

## 2019-02-12 NOTE — PROGRESS NOTES
OT WEEKLY PROGRESS NOTE Time In: 0830 Time Out: 0781 COMPREHENSION MODE Initial Assessment Weekly Progress Assessment 2/12/2019 Score 7 7 EXPRESSION Initial Assessment Weekly Progress Assessment 2/12/2019 Primary Mode of Expression Verbal    
Score 7 7 Comments No issues noted SOCIAL INTERACTION/ PRAGMATICS Initial Assessment Weekly Progress Assessment 2/12/2019 Score 7 7 Comments Appropriate very pleasant and cooperative PROBLEM SOLVING Initial Assessment Weekly Progress Assessment 2/12/2019 Score 7 6 Comments No issues noted solves routine problems; impulsive MEMORY Initial Assessment Weekly Progress Assessment 2/12/2019 Score 7 6 Comments No issues noted recalls people frequently seen; impulsive EATING Initial Assessment Weekly Progress Assessment 2/12/2019 Functional Level 6 6 Comments GROOMING Initial Assessment Weekly Progress Assessment 2/12/2019 Functional Level 5 6 Tasks completed by patient Brushed hair, Brushed teeth, Washed face, Shaved/applied makeup (optional), Washed hands Brushed hair; Washed face; Washed hands;Brushed teeth Comments S/U    
 
BATHING Initial Assessment Weekly Progress Assessment 2/12/2019 Functional Level 4 5 Body parts patient bathed Abdomen, Arm, left, Arm, right, Buttocks, Chest, Lower leg and foot, left, Lower leg and foot, right, Jame area, Thigh, left, Thigh, right Abdomen;Arm, left;Arm, right;Buttocks; Chest;Lower leg and foot, left; Lower leg and foot, right;Jame area; Thigh, left; Thigh, right Comments A for steadying and correcting LOB when reaching seated on tub transfer bench; did not stand for jame care TUB/SHOWER TRANSFER INDEPENDENCE Initial Assessment Weekly Progress Assessment 2/12/2019 Score 4 4 Comments Steadying A, grab bars CGA; LOB to L requiring CGA to correct UPPER BODY DRESSING/UNDRESSING Initial Assessment Weekly Progress Assessment 2/12/2019 Functional Level 4 5 Items applied/Steps completed Pullover (4 steps), Button shirt (4 steps) Pullover (4 steps) Comments S/U; A for one button setup assist   
 
LOWER BODY DRESSING/UNDRESSING Initial Assessment Weekly Progress Assessment 2/12/2019 Functional Level 4 5 Items applied/Steps completed Elastic waist pants (3 steps), Fasten shoe (1 step), Shoe, left (1 step), Shoe, right (1 step), Sock, left (1 step), Sock, right (1 step), Underpants (3 steps) Elastic waist pants (3 steps); Sock, left (1 step); Sock, right (1 step); Underpants (3 steps); Shoe, left (1 step); Fasten shoe (1 step); Shoe, right (1 step) Comments Steadying A when standing to pull up pants setup assist   
 
TOILETING Initial Assessment Weekly Progress Assessment 2/12/2019 Functional Level 5 Comments SBA did not toilet at progress note TOILET TRANSFER INDEPENDENCE Initial Assessment Weekly Progress Assessment 2/12/2019 Transfer score 4 Comments Steadying A, grab bars did not toilet at progress note Plan of Care: Please see Care Plan for updated LTGs. Family Training:  completed today 2/12/2019 with wife; wife verbalized/demonstrated understanding of all education and recommendations. Discussed fall precautions, high fall risk, providing SBA to CGA when ambulating during ADL tasks (especially in the morning), use of urinal for urination at night vs ambulating to the toilet, use of a chair in the shower, sitting to thread legs in/out of pants, non-skid mat in shower, and hand held shower head for safety with bathing. Summary of Progress: Patient has made excellent progress with ADL skills, functional mobility, use of LUE, attention to L side, and activity tolerance. Remains limited by impulsivity and impaired safety awareness. Summary of Session: Patient seated up at edge of bed on arrival to room. Agreeable to OT.  Completed ADL and family training with wife present; see above for details. Patient continues to demonstrate impaired safety awareness (attempted ambulating in socks, required moderate cues to sit to doff pants) and impulsivity with mobility. Continues to experience LOB during functional mobility when ambulating without shoes on during ADL tasks. Continue OT POC with focus on ADL/IADL skills, functional transfers, functional mobility, neuro re-education, cognition, attention, coordination, strength, static and dynamic balance, and activity tolerance to maximize safety and independence with ADLs and functional transfers. Abbey Murrell MS, OTR/L 
2/12/2019

## 2019-02-12 NOTE — PROGRESS NOTES
PT WEEKLY PROGRESS NOTE Time In: 1120 Time Out: 1158 Subjective: \"It feels good to get some fresh air. \" 
 
  
 
Objective: Other (comment)(fall precautions) Outcome Measures: Vital Signs: /82   Pulse 79   Temp 98.3 °F (36.8 °C)   Resp 16   SpO2 96% Pain level: no c/o pain Patient education: pt. Educated on need to exercise caution in crowded areas due to balance impairments - ongoing education needed. Interdisciplinary Communication: collaborated w/ OT regarding pt's progress Cognition: Pt. W/ decreased memory, needing cues to navigate back to room, decrease safety awareness/insight w/ some impulsivity & decreased self-correction noted. AROM: [facility] 
 
FIM SCORES Initial Assessment Weekly Progress Assessment 2/12/2019 Bed/Chair/Wheelchair Transfers 4 5 Wheelchair Mobility 3 NT Walking [place] 3 5 Steps/Stairs 2 2 Please see IRC Interdisciplinary Eval: Coordination/Balance Section for details regarding FIM score description. BED/CHAIR/WHEELCHAIR TRANSFERS Initial Assessment Weekly Progress Assessment 2/12/2019 Rolling Right 6 (Modified independent) 0 (Not tested) Rolling Left 6 (Modified independent) 0 (Not tested) Supine to Sit 5 (Supervision) 0 (Not tested) Sit to Stand Contact guard assistance Supervision Sit to Supine 6 (Modified independent) 0 (Not tested) Transfer Type SPT without device Other(SPT w/ SPC) Comments Increased time and effort to complete bed mobility and transfers with tendency to neglect left UE during both. Decreased coordination of LLE during transfers and bed mobility Supervision for safety w/ balance secondary to decreased balance & impulsivity Car Transfer Not tested Not tested Car Type GROSS ASSESSMENT Weekly Progress Assessment 2/12/2019 AROM Within functional limits Strength Generally decreased, functional  
Coordination Generally decreased, functional  
Tone NT Sensation NT  
PROM [facility] POSTURE Weekly Progress Assessment 2/12/2019 Posture (WDL) Exceptions to [facility] Posture Assessment Trunk flexion;Rounded shoulders [facility] MOBILITY/MANAGEMENT Initial Assessment Weekly Progress Assessment 2/12/2019 Able to Propel 150 feet(using RUE and RLE) NT  
Curbs/ramps assistance required 0 (Not tested) NT Wheelchair set up assistance required 3 (Moderate assistance) NT Wheelchair management Manages left brake, Manages right brake NT  
 
WALKING INDEPENDENCE Initial Assessment Weekly Progress Assessment 2/12/2019 Assistive device Gait belt Cane, straight;Brace/Splint Ambulation assistance - level surface 3 (Moderate assistance) 4 (CGA) Distance 150 Feet (ft) 500 Feet (ft)(x2, 200'x1) Comments slow cont ataxic gait with fluctuating width of base of support, occasional flexion synergy noted LLE with marching type step, occasional scissoring of LLE during swing phase and occasional decreased step clearance LLE with loss of balance x 4 and min to mod assist to regain balance Worked on ambulation in hospital lobby, including managing elevator, doors, & crowded hallways. Pt. Required CGA for occasional balance assist.  
 
 
STEPS/STAIRS Initial Assessment Weekly Progress Assessment 2/12/2019 Steps/Stairs ambulated 4 NT Rail Use Both NT Comments slow reciprocating pattern going up step with decreased step clearance LLE and excessive hip and knee flexion LLE, single step at a time going down steps with impaired left foot placement on step NT  
Curbs/Ramps 0 (Not tested) NT  
 
Pt. Left in recliner in room in NAD, call bell in reach. Assessment: Pt. Making progress towards goals, meeting 4/5 STGs @ this time. However, pt. Remains impulsive w/ decreased insight, increasing his fall risk. Therefore, anticipate pt. Will require supervision @ home, especially when ambulating in areas of high distraction. Benefits from cont. PT services to address. Plan of Care: Please see Care Plan for updated LTGs. Family Training: Has taken place Ed Osorio, PT 
2/12/2019

## 2019-02-12 NOTE — PROGRESS NOTES
PHYSICAL THERAPY DAILY NOTE Time In: 1347 Time Out: 8676 Patient Seen For: PM;Other (see progress notes); Transfer training; Therapeutic exercise;Gait training Subjective: \" I'm a little down on what I can do and what I can't. \" 
 
  
 
Objective: No pain noted. Other (comment)(fall precautions) GROSS ASSESSMENT Daily Assessment AROM: Within functional limits Strength: Generally decreased, functional 
Coordination: Generally decreased, functional  
 
 
COGNITION Daily Assessment BED/MAT MOBILITY Daily Assessment Rolling Right : 0 (Not tested) Rolling Left : 0 (Not tested) Supine to Sit : 0 (Not tested) Sit to Supine : 0 (Not tested) TRANSFERS Daily Assessment Transfer Type: Other(SPT w/ SPC) Other: SPT with straight cane Transfer Assistance : 5 (Supervision/setup) Sit to Stand Assistance: Supervision Car Transfers: Not tested GAIT Daily Assessment Amount of Assistance: 4 (Contact guard assistance) Distance (ft): 200 Feet (ft) Assistive Device: Cane, straight;Brace/Splint(to assist DF) STEPS or STAIRS Daily Assessment Steps/Stairs Ambulated (#): 4 Level of Assist : 0 (Not tested) Rail Use: Right BALANCE Daily Assessment Sitting - Static: Good (unsupported) Sitting - Dynamic: Good (unsupported) Standing - Static: Good Standing - Dynamic : Impaired WHEELCHAIR MOBILITY Daily Assessment LOWER EXTREMITY EXERCISES Daily Assessment Tanisha nixon x 12 minutes Extremity: Both Exercise Type #1: Supine lower extremity strengthening Sets Performed: 1 Reps Performed: 20 Level of Assist: Supervision Assessment: Patient making good progress but at times gets distracted and has loss of balance. Plan of Care: Continue with plan of care . Will follow outpatient PT at discharge. Miles Murillo, GRACE 
2/12/2019

## 2019-02-12 NOTE — PROGRESS NOTES
Pt D/C summary completed on 2/12/19. Please see for specifics in [address] Patient expected to be d/c'd to home on 2/13/19.   Sophia Rivera, CTRS

## 2019-02-12 NOTE — PROGRESS NOTES
Interdisciplinary Conference Notes Interdisciplinary conference completed to discuss plan of care with: Shalom Schafer MD, Davi Ferguson RN, Yue Keys PT, Alaina Lobo PTA, Deanna Perez OTR/L, Carolina Lockhart OTR/L, [facility] Dean CCC-SLP, Lala Morrell [facility] Clint Mina. Triston Lisa RN Estimated D/C Date: 02/13/2019 Recommended Equipment:    
 
Recommended Follow-Up Therapy: Outpatient  PT and OT Communication with family/caregivers: Spoke with Lalo Kessler regarding discharge. Patient to be discharged tomorrow with [address] [place] [address] Patient Rehabilitation.

## 2019-02-13 VITALS
TEMPERATURE: 97.5 F | OXYGEN SATURATION: 98 % | DIASTOLIC BLOOD PRESSURE: 82 MMHG | SYSTOLIC BLOOD PRESSURE: 140 MMHG | HEART RATE: 81 BPM | RESPIRATION RATE: 18 BRPM

## 2019-02-13 PROCEDURE — 74011250637 HC RX REV CODE- 250/637: Performed by: PHYSICAL MEDICINE & REHABILITATION

## 2019-02-13 PROCEDURE — 97116 GAIT TRAINING THERAPY: CPT

## 2019-02-13 PROCEDURE — 97535 SELF CARE MNGMENT TRAINING: CPT

## 2019-02-13 PROCEDURE — 97110 THERAPEUTIC EXERCISES: CPT

## 2019-02-13 PROCEDURE — 99239 HOSP IP/OBS DSCHRG MGMT >30: CPT | Performed by: PHYSICAL MEDICINE & REHABILITATION

## 2019-02-13 PROCEDURE — 97530 THERAPEUTIC ACTIVITIES: CPT

## 2019-02-13 RX ORDER — ATORVASTATIN CALCIUM 80 MG/1
80 TABLET, FILM COATED ORAL
Qty: 30 TAB | Refills: 2 | Status: SHIPPED | OUTPATIENT
Start: 2019-02-13 | End: 2019-02-20 | Stop reason: SDUPTHER

## 2019-02-13 RX ORDER — AMLODIPINE BESYLATE 5 MG/1
5 TABLET ORAL DAILY
Qty: 30 TAB | Refills: 2 | Status: SHIPPED | OUTPATIENT
Start: 2019-02-13 | End: 2019-02-20 | Stop reason: SDUPTHER

## 2019-02-13 RX ORDER — ASPIRIN 81 MG/1
81 TABLET ORAL DAILY
Qty: 30 TAB | Refills: 0 | Status: SHIPPED | OUTPATIENT
Start: 2019-02-13

## 2019-02-13 RX ORDER — IBUPROFEN 200 MG
1 TABLET ORAL DAILY
Qty: 30 PATCH | Refills: 0 | Status: SHIPPED | OUTPATIENT
Start: 2019-02-13 | End: 2019-03-15

## 2019-02-13 RX ADMIN — AMLODIPINE BESYLATE 5 MG: 5 TABLET ORAL at 10:04

## 2019-02-13 RX ADMIN — ASPIRIN 81 MG: 81 TABLET, COATED ORAL at 10:04

## 2019-02-13 NOTE — PROGRESS NOTES
Problem: Mobility Impaired (Adult and Pediatric) Goal: *Therapy Goal (Edit Goal, Insert Text) STG 5. Patient ambulate up/down 6 inch step with LRAD and min assist in 1 week (2/12/19 MET) LTG 5. Patient ambulate up/down 6 inch step with LRAD and SBA in 2 weeks Outcome: Not Met 
Variance: Patient slowly responding Comments: Pt. Required CGA for safety w/ balance

## 2019-02-13 NOTE — PROGRESS NOTES
Problem: Mobility Impaired (Adult and Pediatric) Goal: *Therapy Goal (Edit Goal, Insert Text) STG 2. Patient transfer sit<>stand and perform stand pivot transfer with SBA assist in 1 week (2/12/19 MET) LTG 2. Patient transfer sit<>stand and perform stand pivot transfer with modified independence in 2 weeks Outcome: Not Met 
Variance: Patient slowly responding Comments: Pt. Cont to require supervision secondary to decreased balance & impulsivity

## 2019-02-13 NOTE — PROGRESS NOTES
CASE MANAGEMENT DISCHARGE NOTE Discharge Destination:  Home with wife with outpatient rehab orders Discharge Date:  02/13/2019 DME: [facility] Agency: None Out Patient Facility:  [address] [place] [address] Patient Rehab at [address] STR: none

## 2019-02-13 NOTE — PROGRESS NOTES
Problem: Falls - Risk of 
Goal: *Absence of Falls Document Kenyetta Baron Fall Risk and appropriate interventions in the flowsheet. Outcome: Progressing Towards Goal 
Fall Risk Interventions: 
Mobility Interventions: Bed/chair exit alarm, Utilize walker, cane, or other assistive device Medication Interventions: Patient to call before getting OOB Elimination Interventions: Call light in reach Problem: Pressure Injury - Risk of 
Goal: *Prevention of pressure injury Document Eduardo Scale and appropriate interventions in the flowsheet. Pressure Injury Interventions: 
Sensory Interventions: Assess changes in LOC Moisture Interventions: Absorbent underpads Activity Interventions: Increase time out of bed Mobility Interventions: HOB 30 degrees or less Nutrition Interventions: Document food/fluid/supplement intake Friction and Shear Interventions: HOB 30 degrees or less

## 2019-02-13 NOTE — PROGRESS NOTES
PHYSICAL THERAPY DISCHARGE SUMMARY 
[id number] Precautions at discharge: Other (comment)(falls) Problem List:   
Decreased strength B LE  [x] Decreased strength trunk/core  [] Decreased AROM   [] Decreased PROM  [] Decreased balance sitting  [] Decreased balance standing  [x] Decreased endurance  [] Pain  [] Functional Limitations:  
Decreased independence with bed mobility  [] Decreased independence with functional transfers  [x] Decreased independence with ambulation  [x] Decreased independence with stair negotiation  [x] Outcome Measures: Vital Signs: /82 (BP 1 Location: Left arm, BP Patient Position: At rest)   Pulse 81   Temp 97.5 °F (36.4 °C)   Resp 18   SpO2 98% Pain level: no c/o pain Patient education: reviewed stair & curb management using SPC, decreased recall from education yesterday noted. Interdisciplinary Communication: collaborated w/ OT regarding pt's d/c plan Cognition: Pt. Alert & appropriate. Continues to demonstrate impulsivity w/ decreased insight into deficits. Needed cues to problem solve safest approach to stairs & unable to recall stair technique as instructed during PT session yesterday. MMT Initial Asssessment Right Lower Extremity Left Lower Extremity Hip Flexion 5 4 Knee Extension 5 5 Knee Flexion 5 4 Ankle Dorsiflexion 5 4-  
 
 MMT Discharge Assessment Right Lower Extremity Left Lower Extremity Hip Flexion 5 4+ Knee Extension 5 4 Knee Flexion 5 4+ Ankle Dorsiflexion 5 4  
0/5 No palpable muscle contraction 1/5 Palpable muscle contraction, no joint movement 2-/5 Less than full range of motion in gravity eliminated position 2/5 Able to complete full range of motion in gravity eliminated position 2+/5 Able to initiate movement against gravity 3-/5 More than half but not full range of motion against gravity 3/5 Able to complete full range of motion against gravity 3+/5 Completes full range of motion against gravity with minimal resistance 4-/5 Completes full range of motion against gravity with minimal-moderate resistance 4/5 Completes full range of motion against gravity with moderate resistance 4+/5 Completes full range of motion against gravity with moderate-maximum resistance 5/5 Completes full range of motion against gravity with maximum resistance AROM: [facility] 
 
FIM SCORES Initial Assessment Discharge Assessment Bed/Chair/Wheelchair Transfers 4 5 Wheelchair Mobility 3 NT Walking [place] 3 5 Steps/Stairs 2 4 PRIMARY MODE OF LOCOMOTION: ambulation Please see IRC Interdisciplinary Eval: Coordination/Balance Section for details regarding FIM score description. BED/CHAIR/WHEELCHAIR TRANSFERS Initial Assessment Discharge Assessment Rolling Right 6 (Modified independent) 6 (Modified independent) Rolling Left 6 (Modified independent) 6 (Modified independent) Supine to Sit 5 (Supervision) 6 (Modified independent) Sit to Stand Contact guard assistance Supervision Sit to Supine 6 (Modified independent) 6 (Modified independent) Transfer Assist Score 4 5 Transfer Type SPT without device Other(SPT w/ SPC) Comments Increased time and effort to complete bed mobility and transfers with tendency to neglect left UE during both. Decreased coordination of LLE during transfers and bed mobility supervision secondary to impulsivity & decreased balance Car Transfer Not tested Supervision Car Type rehab car rehab car [facility] MOBILITY/MANAGEMENT Initial Assessment Discharge Assessment Able to Propel 150 feet(using RUE and RLE) 0 feet Functional Level 3 NT Curbs/ramps assistance required 0 (Not tested) 0 (Not tested) Wheelchair set up assistance required 3 (Moderate assistance) 0 (Not tested) Wheelchair management Manages left brake, Manages right brake NT  
NT as ambulation to be primary mode of locomotion. WALKING INDEPENDENCE Initial Assessment Discharge Assessment Assistive device Gait belt Cane, straight;Brace/Splint(DF assist strap) Ambulation assistance - level surface 3 (Moderate assistance) Distance 150 Feet (ft) 300 Feet (ft) Functional Level 3 5 Comments slow cont ataxic gait with fluctuating width of base of support, occasional flexion synergy noted LLE with marching type step, occasional scissoring of LLE during swing phase and occasional decreased step clearance LLE with loss of balance x 4 and min to mod assist to regain balance ataxic L LE in swing w/ occasional L knee instability in stance, decreased trunk rotation & arm swing L LUE Ambulation assistance - unlevel surface 0 (Not tested) 5 (Supervision/setup) STEPS/STAIRS Initial Assessment Discharge Assessment Steps/Stairs ambulated 4 20 Rail Use Both Left Functional Level 2 4 Comments slow reciprocating pattern going up step with decreased step clearance LLE and excessive hip and knee flexion LLE, single step at a time going down steps with impaired left foot placement on step min A for 2 LOB; pt. initially used step over gait pattern & needed VCs to problem solve the need to use step to gait pattern Curbs/Ramps 0 (Not tested) 4 (Contact guard assistance) QUALITY INDICATOR ASSIST COMMENTS Walk 10 feet supervision Using SPC; Supervision for safety w/ balance. Walk 50 feet with 2 turns supervision Using [address]; Supervision for safety w/ balance. Walk 150 feet supervision Using SPC; Supervision for safety w/ balance. Walk 10 feet on uneven  supervision Using [address]; Supervision for safety w/ balance. 1 step/curb CGA Using SPC; CGA for balance assist  
4 steps supervision Using [address]; Supervision for safety w/ balance & VCs for sequencing 12 steps Minimum assistance Min A for 2 LOB  object Minimum assistance Min A for balance Wheel 50' w/2 turns NT NT as ambulation to be primary mode of locomotion. Wheel 150' NT NT as ambulation to be primary mode of locomotion. Pt. Left in recliner in NAD, call bell in reach. PHYSICAL THERAPY PLAN OF CARE 
 
LTGs: Pt. Met 2/5 LTGs, d/cing home @ supervision level Pt would benefit from continued skilled physical therapy in order to improve independent functional mobility within the home with use of least restrictive device. Interventions may include range of motion (AROM, PROM B LE/trunk), motor function (B LE/trunk strengthening/coordination), activity tolerance (vitals, oxygen saturation levels), bed mobility training, balance activities, gait training (progressive ambulation program), and functional transfer training. HEP handout: 
Deferred as pt. Will be stating OP PT shortly after d/c Pt to be discharged home with 24/7 supervision provided by pt's wife. Therapy Recommendations upon discharge: 24 Hour Supervision; Outpatient PT Equipment needs at discharge:   [facility] Please see IRC; Interdisciplinary Eval, Care Plan, and Patient Education for further information regarding physical therapy discharge summary and plan of care. Manjit Mix, PT 
2/13/2019

## 2019-02-13 NOTE — PROGRESS NOTES
Received order to discharge patient. Discharge/medication instructions reviewed with patient/wife, verbalized understanding. Prescriptions given to patient/wife. Patient escorted to discharge area via wheelchair.

## 2019-02-13 NOTE — PROGRESS NOTES
Problem: Self Care Deficits Care Plan (Adult) Goal: *Therapy Goal (Edit Goal, Insert Text) STG 1: Pt will be Mod I with toileting by 2/12/19 to prevent skin breakdown. Downgrade to supervision 2/12/2019 Not met 2/13/2019 Outcome: Not Met 
Variance: Patient/Family Other Comments: Goal not met due to patient did not toilet during discharge evaluation.

## 2019-02-13 NOTE — DISCHARGE INSTRUCTIONS
Cardiovascular Discharge Instructions    Patient Discharge    Cheikh Erwin / [id number] : 1945    Admitted 2019 Discharged: 2019       When  [address]    If you have any of the following symptoms, call for emergency help immediately. The sooner you get help, the more doctors can do to prevent permanent damage. · Sudden weakness or numbness of the face, arm or leg on one side of the body   · Sudden dimness or loss of vision, particularly in one eye   · Loss of speech, trouble talking or understanding what others are saying   · Sudden severe headache with no known cause   · Unexplained dizziness, unstable walking or falling, especially along with any of the other symptoms    Diet    · You are on a low fat, low cholesterol, low sodium diet. You should continue this diet at home to help prevent future health problems. · Please contact your physician's office if you have any diet related questions. Medications    · It is important that you take the medication exactly as they are prescribed. · Keep your medication in the bottles provided by the pharmacist and keep a list of the medication names, dosages, and times to be taken in your wallet. · Do not take other medications without consulting your doctor.        Personal Items and Belongings    The following personal items have been returned to you:    Valuables Screen  Clothing: Pants, Shirt, Socks, With patient  Computer: None  Dental Appliances: Partials  Home Medications: None  Jewelry: None  Luggage with Personal Belongings: None  Other Valuables: Cell Phone        DISCHARGE SUMMARY from Nurse    PATIENT INSTRUCTIONS:    After general anesthesia or intravenous sedation, for 24 hours or while taking prescription Narcotics:  · Limit your activities  · Do not drive and operate hazardous machinery  · Do not make important personal or business decisions  · Do  not drink alcoholic beverages  · If you have not urinated within 8 hours after discharge, please contact your surgeon on call. Report the following to your surgeon:  · Excessive pain, swelling, redness or odor of or around the surgical area  · Temperature over 100.5  · Nausea and vomiting lasting longer than 4 hours or if unable to take medications  · Any signs of decreased circulation or nerve impairment to extremity: change in color, persistent  numbness, tingling, coldness or increase pain  · Any questions    What to do at Home:  Recommended activity: Activity as tolerated/per physical therapy recommendation. If you experience any of the following symptoms fever 101.5, nausea/vomiting, chills, weakness, or shortness of breath, please follow up with MD.    *  Please give a list of your current medications to your Primary Care Provider. *  Please update this list whenever your medications are discontinued, doses are      changed, or new medications (including over-the-counter products) are added. *  Please carry medication information at all times in case of emergency situations. These are general instructions for a healthy lifestyle:    No smoking/ No tobacco products/ Avoid exposure to second hand smoke  Surgeon General's Warning:  Quitting smoking now greatly reduces serious risk to your health. Obesity, smoking, and sedentary lifestyle greatly increases your risk for illness    A healthy diet, regular physical exercise & weight monitoring are important for maintaining a healthy lifestyle    You may be retaining fluid if you have a history of heart failure or if you experience any of the following symptoms:  Weight gain of 3 pounds or more overnight or 5 pounds in a week, increased swelling in our hands or feet or shortness of breath while lying flat in bed. Please call your doctor as soon as you notice any of these symptoms; do not wait until your next office visit.     Recognize signs and symptoms of STROKE:    F-face looks uneven    A-arms unable to move or move unevenly    S-speech slurred or non-existent    T-time-call 911 as soon as signs and symptoms begin-DO NOT go       Back to bed or wait to see if you get better-TIME IS BRAIN. Warning Signs of HEART ATTACK     Call 911 if you have these symptoms:   Chest discomfort. Most heart attacks involve discomfort in the center of the chest that lasts more than a few minutes, or that goes away and comes back. It can feel like uncomfortable pressure, squeezing, fullness, or pain.  Discomfort in other areas of the upper body. Symptoms can include pain or discomfort in one or both arms, the back, neck, jaw, or stomach.  Shortness of breath with or without chest discomfort.  Other signs may include breaking out in a cold sweat, nausea, or lightheadedness. Don't wait more than five minutes to call 911 - MINUTES MATTER! Fast action can save your life. Calling 911 is almost always the fastest way to get lifesaving treatment. Emergency Medical Services staff can begin treatment when they arrive -- up to an hour sooner than if someone gets to the hospital by car. The discharge information has been reviewed with the patient. The patient verbalized understanding. Discharge medications reviewed with the patient and appropriate educational materials and side effects teaching were provided.   ___________________________________________________________________________________________________________________________________

## 2019-02-13 NOTE — PROGRESS NOTES
Problem: Falls - Risk of 
Goal: *Absence of Falls Document Skye Carrizales Fall Risk and appropriate interventions in the flowsheet. Outcome: Progressing Towards Goal 
Fall Risk Interventions: 
Mobility Interventions: Bed/chair exit alarm, Patient to call before getting OOB Medication Interventions: Patient to call before getting OOB Elimination Interventions: Call light in reach

## 2019-02-13 NOTE — PROGRESS NOTES
Discharge Destination: Home and Rehab patient discharged with wife. DME:[facility] Agency: none Out Patient Facility:  [address] [place] [address] Patient Rehabilitation for PT/OT. STR: None

## 2019-02-13 NOTE — PROGRESS NOTES
OT DISCHARGE REPORT Time In: 0405 Time Out: 4610 COMPREHENSION MODE Initial Assessment Discharge Assessment 2/13/2019 Score 7 7 EXPRESSION Initial Assessment Discharge Assessment 2/13/2019 Primary Mode of Expression Verbal    
Score 7 7 Comments No issues noted SOCIAL INTERACTION/ PRAGMATICS Initial Assessment Discharge Assessment 2/13/2019 Score 7 7 Comments Appropriate PROBLEM SOLVING Initial Assessment Discharge Assessment 2/13/2019 Score 7 6 Comments No issues noted minor assist with complex problems MEMORY Initial Assessment Discharge Assessment 2/13/2019 Score 7 6 Comments No issues noted recalls people frequently seen, mild difficulty recalling safety training EATING Initial Assessment Discharge Assessment 2/13/2019 Functional Level 6 7 Comments [address] Initial Assessment Discharge Assessment 2/13/2019 Functional Level 5 7 Tasks completed by patient Brushed hair, Brushed teeth, Washed face, Shaved/applied makeup (optional), Washed hands Brushed hair; Washed face; Washed hands Comments S/U    
 
BATHING Initial Assessment Discharge Assessment 2/13/2019 Functional Level 4 6 Body parts patient bathed Abdomen, Arm, left, Arm, right, Buttocks, Chest, Lower leg and foot, left, Lower leg and foot, right, Samantha area, Thigh, left, Thigh, right Abdomen;Arm, left;Arm, right;Buttocks; Chest;Lower leg and foot, left; Lower leg and foot, right;Samantha area; Thigh, left; Thigh, right Comments A for steadying and correcting LOB when reaching TUB/SHOWER TRANSFER INDEPENDENCE Initial Assessment Discharge Assessment 2/13/2019 Score 4 5 Type of Shower: Shower Adaptive  Equipment:Shower Chair and Grab bars Comments Steadying A, grab bars close SBA; with tennis shoes on   
 
UPPER BODY DRESSING/UNDRESSING Initial Assessment Discharge Assessment 2/13/2019 Functional Level 4 6 Items applied/Steps completed Pullover (4 steps), Button shirt (4 steps) Pullover (4 steps) Comments S/U; A for one button LOWER BODY DRESSING/UNDRESSING Initial Assessment Discharge Assessment 2/13/2019 Functional Level 4 5 Items applied/Steps completed Elastic waist pants (3 steps), Fasten shoe (1 step), Shoe, left (1 step), Shoe, right (1 step), Sock, left (1 step), Sock, right (1 step), Underpants (3 steps) Elastic waist pants (3 steps); Sock, left (1 step); Shoe, right (1 step); Sock, right (1 step); Fasten shoe (1 step); Underpants (3 steps) Comments Steadying A when standing to pull up pants SBA when standing for clothing management up TOILETING Initial Assessment Discharge Assessment 2/13/2019 Functional Level 5 Comments SBA did not toilet during discharge evaluation TOILET TRANSFER INDEPENDENCE Initial Assessment Discharge Assessment 2/13/2019 Transfer score 4 Comments Steadying A, grab bars did not toilet during discharge evaluation Plan of Care: Please see Care Plan for progress towards goals during stay Precautions at Discharge: Falls, Poor Safety Awareness and Impulsive Discharge Location: home with supervision/assistance from wife Safety/Supervision Recommendations/Functional Level:    recommend CGA to SBA during functional mobility, assist with ADLs, assist with IADLs, NO driving until cleared by a driving rehab specialist  
Family Training: completed 2/12/19 with wife, who verbalized/demonstrated understanding of all education Recommended Continuing Therapy: 24 Hour Supervision; Outpatient OT; Outpatient PT Residual Deficits: ataxia, high fall risk, impaired static and dynamic balance, impaired fine motor coordination, impaired divided attention, impaired safety awareness, impaired insight Progress over LOS: Patient made good progress with ADL skills, functional transfers, activity tolerance, strength, balance, coordination, and LUE neuro re-ed.  Patient remains impaired by above noted deficits and remains a high fall risk due to impaired balance. Recommend follow-up OT to address above noted deficits. Summary of Session: Patient seated up at edge of bed on arrival to room. Agreeable to OT. Completed ADL; see above for details. Patient has met all goals established and modified from initial evaluation except toileting due to patient did not toilet during discharge evaluation. Patient is aware of recommendation to not drive until cleared by rehab specialist and is aware of high fall risk. Patient tolerated session well with no complaint of pain and was left seated up in recliner with call light/all needs in reach. Patient to have follow-up outpatient OT. Patient to DC to home today. Paulina Jurado MS, OTR/L 
2/13/2019

## 2019-02-13 NOTE — PROGRESS NOTES
Problem: Mobility Impaired (Adult and Pediatric) Goal: *Therapy Goal (Edit Goal, Insert Text) STG 4. Patient ambulate up/down 4 steps with hand rails and SBA in 1 week (2/12/19 MET) LTG 4. Patient ambulate up/down 12 steps with hand rails and supervision assist in 2 weeks Outcome: Not Met 
Variance: Patient slowly responding Comments: Pt. Requires occasional min A for LOB

## 2019-02-13 NOTE — DISCHARGE SUMMARY
REHABILITATION DISCHARGE SUMMARY     Patient: Charles Garcia MRN: [id number]  SSN: [id number]    YOB: 1945  Age: 68 y.o. Sex: male      Date: 2/13/2019  Admit Date: 2/4/2019  Discharge Date: 2/13/2019    Admitting Physician: Jaren Cox MD   Primary Care Physician: Unknown, Provider     Admission Condition: good    Chief Complaint:  Admission Diagnosis:Chief Complaint : Gait dysfunction secondary to below. Admit Diagnosis: CVA (cerebral vascular accident) ([facility] [address] [I63.9]; Left hemiparesis ([address] [G81.94]; Sensory deficit, left [R41.89]; Gait difficulty [R26.9]  Pain  DVT risk  Acute Rehab Dx:  Gait impairment  Debility    deconditioning  Mobility and ambulation deficits  Self Care/ADL deficits    HPI: Charles Garcia is a 68 y.o. male patient at [address] [address] who was admitted on 2/4/2019  by Jaren Cox MD with below mentioned medical history ,is being seen for Physical Medicine and Rehabilitation.       Patient presented to [facility] January 28, 2019 with acute onset left-sided weakness difficulty standing. You also complained of mild numbness to his left side. He had no head injury fever and chills shortness of breath chest pain dysuria. He did not have any vision changes alterations in speech, Voice, or swallowing. Patient has history of hypertension hypercholesterolemia, Wegeners granulomatosis. Head CT was negative for acute hemorrhage. Tell her neurologist recommended TPA and was given in the ER. Patient was admitted And manage for acute CVA and secondary prevention. MRI confirmed a small acute early subacute infarction in the right thalamus. CTA showed moderate to significant stenosis noted in the right internal carotid siphon. Diseases also noted in the V segment of the right vertebral artery with occlusive to near occlusive narrowing.     Physical therapy was initiated there and patient was starting to mobilize.  However, Patient shows significant functional deficits due to left sided weakness and sensory deficit. Our service was consulted for rehab needs and we recommended inpatient hospital rehabilitation is reasonable and necessary due to ongoing acute medical issues which have not changed since initial pre-admission evaluation. and rehab needs still likely best managed in IRU setting. The patient was evaluated by [facility] admissions coordinators. I reviewed the preadmission screening and have approved for admission to the [facility]. The patient was cleared for transfer to rehab today. Patient continues to have ongoing  medical issues outlined above requiring physician medical supervision and functional deficits which would benefit from continued intensive therapies. Rehabiitation Course:   Patient was admitted to acute medical rehab unit at [facility] and began management for acute stroke and secondary prevention. Patient was administered TPA prior to admission at [facility]. Therefore require continued monitoring of neurological changes for possible hemorrhage. Throat is admission patient demonstrated no setbacks and their logic deficits. Patient did demonstrate significant left upper extremity more than lower extremity dysmetria and poor limb control. Patient was continued on aspirin and Statin. Also patient was monitored, manage for blood pressure control. Patient is continuing NicoDerm for a smoke cessation. Functionally patient at progress well with PT and OT. Patient is provided a dorsiflex assist Strap to aid in clearing his foot and maintaining better balance and gait pattern. Patient progressed Functionally had reached his short term goals. Patient is ambulating with modified independent level. ADLs require supervision by his wife. He remains still a mild fall risk and he is aware. Patient will follow up with PCP and follow with me in four weeks. He will be set up with outpatient PT and OT.       Home Architecture: Home Situation  Home Environment: Private residence (02/05/19 0700)  # Steps to Enter: 1 (02/05/19 0700)  Rails to Enter: No (02/05/19 0700)  Wheelchair Ramp: No (02/05/19 0700)  One/Two Story Residence: One story (02/05/19 0700)  Living Alone: Yes (02/05/19 0700)  Support Systems: Spouse/Significant Other/Partner (02/05/19 0700)  Patient Expects to be Discharged to[de-identified] Private residence (02/05/19 0700)  Current DME Used/Available at Home: None (02/05/19 0700)  Tub or Shower Type: Shower (02/11/19 1100)     No past medical history on file. No past surgical history on file. No family history on file. Social History     Tobacco Use    Smoking status: Not on file   Substance Use Topics    Alcohol use: Not on file       Allergies   Allergen Reactions    Penicillins Not Reported This Time       Prior to Admission medications    Medication Sig Start Date End Date Taking? Authorizing Provider   predniSONE (DELTASONE) 5 mg tablet Take 5 mg by mouth daily. Yes Provider, Historical       Current Medications:  Current Facility-Administered Medications   Medication Dose Route Frequency Provider Last Rate Last Dose    amLODIPine (NORVASC) tablet 5 mg  5 mg Oral DAILY Sabino Aragon MD   5 mg at 02/12/19 0669    aspirin delayed-release tablet 81 mg  81 mg Oral DAILY Sabino Aragon MD   81 mg at 02/12/19 0355    atorvastatin (LIPITOR) tablet 80 mg  80 mg Oral QHS Sabino Aragon MD   80 mg at 02/12/19 2105    nicotine (NICODERM CQ) 21 mg/24 hr patch 1 Patch  1 Patch TransDERmal DAILY Sabino Aragon MD   1 Patch at 02/12/19 3568        Review of Systems: Denies chest pain, shortness of breath, cough, headache, visual problems, abdominal pain, dysurea, calf pain. Pertinent positives are as noted in the medical records and unremarkable otherwise. Vital Signs:   Patient Vitals for the past 8 hrs:   BP Temp Pulse Resp SpO2   02/13/19 0756 140/82 97.5 °F (36.4 °C) 81 18 98 %        Physical Exam:         General:   Alert, appears stated age. NAD.    HEENT: Normocephalic No JVD sitting. Lungs:  CTA. Heart:  Regular rate and rhythm, S1, S2, No murmur    Genitourinary: defered   Abdomen:  Soft, non-tender to palpation in all four quadrants. Neuromuscular:  PERRL, EOMI  LUE     Shoulder abduction   5-/5              Elbow flexion:  5-/5               Wrist extension:  4+/5              Finger flexion;  4+/5  RUE    Shoulder abduction:  5/5                Elbow flexion:  5 /5               Wrist extension: 5 /5              Finger flexion:   5/5  LLE     Hip flexion:   4+/5              Knee extension:  5- /5               Ankle dorsiflexion: 4+  /5              Ankle plantarflexion:  5-+/5                                     RLE     Hip flexion:  5 /5              Knee extension:   5/5               Ankle dorsiflexion: 5  /5              Ankle plantarflexion:  5 /5  Sensory - decreased sensation to soft touch, pin prock, proprioception Left side.   LUE>LLE limb ataxia.       Skin:  Intact, dry, good skin turgor, age related changes present   Edema: none              Lab Review:   Recent Results (from the past 72 hour(s))   METABOLIC PANEL, BASIC    Collection Time: 02/11/19  7:29 AM   Result Value Ref Range    Sodium 143 136 - 145 mmol/L    Potassium 3.8 3.5 - 5.1 mmol/L    Chloride 109 (H) 98 - 107 mmol/L    CO2 27 21 - 32 mmol/L    Anion gap 7 7 - 16 mmol/L    Glucose 86 65 - 100 mg/dL    BUN 13 8 - 23 MG/DL    Creatinine 0.77 (L) 0.8 - 1.5 MG/DL    GFR est AA >60 >60 ml/min/1.73m2    GFR est non-AA >60 >60 ml/min/1.73m2    Calcium 8.7 8.3 - 10.4 MG/DL   MAGNESIUM    Collection Time: 02/11/19  7:29 AM   Result Value Ref Range    Magnesium 2.2 1.8 - 2.4 mg/dL       PT Initial  PT Most Recent   AROM: Generally decreased, functional (02/09/19 1300) Within functional limits (02/12/19 1400)         Strength: Generally decreased, functional (02/09/19 1300) Generally decreased, functional (02/12/19 1400)   Coordination: Generally decreased, functional (02/09/19 1300) Generally decreased, functional (02/12/19 1400)               Amount of Assistance: 4 (Minimal assistance)(used ace wrap for DF on left) (02/05/19 1217) 4 (Contact guard assistance) (02/12/19 1553)   Distance (ft): 150 Feet (ft) (02/04/19 1700) 200 Feet (ft) (02/12/19 1553)   Assistive Device: Gait belt (02/04/19 1700) Cane, straight;Brace/Splint(to assist DF) (02/12/19 1553)       OT Initial OT Most Recent                                               ST Initial ST Most Recent                        Active Problems:    Gait difficulty (2/4/2019)      CVA (cerebral vascular accident) ([facility] [address] (2/4/2019)      Left hemiparesis ([address] (2/4/2019)      Sensory deficit, left (2/4/2019)          Condition on discharge :  good       Discharge Instructions:  CVA  - small acute early subacute infarction in the right thalamus. CTA   - left  Hemiparesis  - aspirin, statin BP  Control, risk factor reduction. -LUE motor strength is fairly good, however he has poor control of his arm, more distally. PT using DF assist to improve RLE clearance, gait pattern. -  L foot excessive inverting at times. Patient will need a device to assist left ankle for safer gait. continue medical management for CVA, secondary prevention. - pt has reached his short term rehab goals. Anticipate home discharge with wife      Hypertension -  Acceptable control.   -  Continue norvasc 5 daily  - Pt will discharge on norvasc 5.         Hypercholesterolemia - lipitor 80     Smoker - continue nicoderm. - counceling.          Follow up with PCP per instructions. Follow up with Dr. Stefany Rodriguez MD in 4 weeks. Discharge Medications:     amLODIPine (NORVASC) tablet 5 mg    Take 1 Tab by mouth daily. - Oral      aspirin delayed-release tablet 81 mg   Take 1 Tab by mouth daily. - Oral      atorvastatin (LIPITOR) tablet 80 mg  Take 2 Tabs by mouth nightly. - Oral     nicotine (NICODERM CQ) 21 mg/24 hr patch 1 Patch   1 Patch by TransDERmal route daily.  - TransDERmal       Current Discharge Medication List      CONTINUE these medications which have NOT CHANGED    Details   predniSONE (DELTASONE) 5 mg tablet Take 5 mg by mouth daily. HOLD. Discharge time: 35 minutes.     Signed By: Herb Thornton MD     February 13, 2019

## 2019-02-15 ENCOUNTER — HOSPITAL ENCOUNTER (OUTPATIENT)
Dept: PHYSICAL THERAPY | Age: 74
Discharge: HOME | End: 2019-02-15
Payer: MEDICARE

## 2019-02-15 PROCEDURE — 97162 PT EVAL MOD COMPLEX 30 MIN: CPT

## 2019-02-15 PROCEDURE — 97165 OT EVAL LOW COMPLEX 30 MIN: CPT

## 2019-02-15 PROCEDURE — 97110 THERAPEUTIC EXERCISES: CPT

## 2019-02-15 NOTE — THERAPY EVALUATION
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  
Penicillins MD Orders: eval and treat; PT and OT MEDICAL/REFERRING DIAGNOSIS: 
Cerebral infarction, unspecified [I63.9] DATE OF ONSET: 19 REFERRING PHYSICIAN: Florence Bourne MD 
RETURN PHYSICIAN APPOINTMENT:   
INITIAL ASSESSMENT:  Mr. Nona Rich presents with L ankle weakness, abnormal coordination L LE, abnormal gait pattern, and impaired higher level balance skills. Patient would benefit from PT to address these problems to improve patient's independence and safety with mobility and daily activities. Thank you. PROBLEM LIST (Impacting functional limitations): 1. Decreased Strength 2. Decreased ADL/Functional Activities 3. Decreased Transfer Abilities 4. Decreased Ambulation Ability/Technique 5. Decreased Balance 6. Decreased Activity Tolerance 7. Decreased Flexibility/Joint Mobility 8. Decreased [place] with Home Exercise Program INTERVENTIONS PLANNED: (Treatment may consist of any combination of the following) 1. Balance Exercise 2. Gait Training 3. Home Exercise Program (HEP) 4. Neuromuscular Re-education/Strengthening 5. Therapeutic Activites 6. Therapeutic Exercise/Strengthening 7. Coordination training TREATMENT PLAN: 
Effective Dates: 2/15/2019 TO 5/15/2019 (90 days). Frequency/Duration: 1-2 times a week for 60- 90 Day(s) GOALS: (Goals have been discussed and agreed upon with patient.) Short-Term Functional Goals: Time Frame: 2-4 weeks 1. Patient will demonstrate independence and compliance with home exercise program to improve balance and strength for daily activities. 2. Patient will increase his score on the Ross Balance Scale to greater than or equal to 51/56 indicating improved safety and decreased fall risk for daily activities. 3.  
Discharge Goals: Time Frame: 8-12 weeks 1. Patient will increase L ankle strength to greater than or equal to 4+/5 to improve strength for functional mobility activities. 2. Patient will increase his score on the Ross Balance Scale to greater than or equal to 52/56 indicating improved safety and decreased fall risk for daily activities. 3. Patient will increase his score on the dynamic gait index to greater than or equal to 23/24 indicating improved balance and safety for community ambulation. Patient will ambulate with least assistive device over level and unlevel surfaces without evidence of imbalance to improve safety for daily activities. Rehabilitation Potential For Stated Goals: Good Regarding Prince Kwong therapy, I certify that the treatment plan above will be carried out by a therapist or under their direction. Thank you for this referral, 
Catina Iyer PT Referring Physician Signature: Faviola Herrera MD            Date The information in this section was collected on 2/15/19 (except where otherwise noted). HISTORY:  
History of Present Injury/Illness (Reason for Referral): S/p CVA with L hemiparesis. Had therapies on 9th floor 2/4/19-2/13/19, discharged to home with wife. No falls. Fatigue improving. Per MD notes: \"Patient presented to [facility] January 28, 2019 with acute onset left-sided weakness difficulty standing. You also complained of mild numbness to his left side. He had no head injury fever and chills shortness of breath chest pain dysuria.  He did not have any vision changes alterations in speech, Voice, or swallowing. Patient has history of hypertension hypercholesterolemia, Wegeners granulomatosis. Head CT was negative for acute hemorrhage. Tell her neurologist recommended TPA and was given in the ER. Patient was admitted And manage for acute CVA and secondary prevention. MRI confirmed a small acute early subacute infarction in the right thalamus. CTA showed moderate to significant stenosis noted in the right internal carotid siphon. Diseases also noted in the V segment of the right vertebral artery with occlusive to near occlusive narrowing. \" 
  
 
 
Past Medical History/Comorbidities: Mr. Irena Myers  has no past medical history on file. Mr. Irena Myers  has no past surgical history on file. Patient has history of hypertension hypercholesterolemia, Wegeners granulomatosis Social History/Living Environment:  
  ; getting grab bar in walk in shower and hand held shower head, has a seat. Used to take dog to dog park. Wants to drive. Used to fix things in the house and a little yard work. Prior Level of Function/Work/Activity: 
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  
      RIGHT Previous Treatment Approaches:   
      Inpatient rehab Personal Factors:   
      Sex:  male Age:  68 y.o. Ambulatory/Rehab Services H2 Model Falls Risk Assessment Risk Factors: 
     (1)  Gender [Male] Ability to Rise from Chair: 
     (0)  Ability to rise in a single movement Falls Prevention Plan: No modifications necessary Total: (5 or greater = High Risk): 1 ©2010 [facility] . [facility] Patent #[id number]. Federal Law prohibits the replication, distribution or use without written permission from [facility] [employer] Current Medications:   
  
Current Outpatient Medications:  
  amLODIPine (NORVASC) 5 mg tablet, Take 1 Tab by mouth daily. , Disp: 30 Tab, Rfl: 2   aspirin delayed-release 81 mg tablet, Take 1 Tab by mouth daily. , Disp: 30 Tab, Rfl: 0 
  atorvastatin (LIPITOR) 80 mg tablet, Take 1 Tab by mouth nightly., Disp: 30 Tab, Rfl: 2 
  nicotine (NICODERM CQ) 21 mg/24 hr, 1 Patch by TransDERmal route daily for 30 days. , Disp: 30 Patch, Rfl: 0 Date Last Reviewed:  2/15/19 Number of Personal Factors/Comorbidities that affect the Plan of Care: 1-2: MODERATE COMPLEXITY EXAMINATION:  
Observation/Orthostatic Postural Assessment:   
      Mild forward head posture Strength:   
      LE STRENGTH:  4+/5 B hip flexion, 4+/5 B hip abduction, 4+/5 B hip adduction, 4+/5 B hip extension, 4+/5 B knee extension, 4+/5 B knee flexion, 4+/5 R 4-/5 L ankle dorsiflexion, 4/5 B ankle plantarflexion, 4/5 B ankle inversion, 4+/5 R L 4-/5 ankle eversion. Functional Mobility:  
      Gait/Ambulation:  Patient ambulates with no AD (carrying single point cane). He demonstrates good L foot clearance without orthotic assistance but has a device from inpatient rehab if ankle dorsiflexion fatigues, as strength as improved since stroke. Patient demonstrates slowed L LE swing phase during gait. When walking with dual task or head motion, patient demonstrates incoordination L LE during swing phase of gait. Transfers:  independent Bed Mobility:  independent Stairs:  With rail Perception:   
      intact Coordination:   
      Decreased L LE, particularly with dual tasking during standing/walking Mental Status:   
      A + O x 3 Vision:   
      No vision loss Sensation:  
      Decreased sensation L LE and UE but very functional.  
Postural Control & Balance:· Ross Balance Scale:  50/56.   (A score less than 45/56 indicates high risk of falls) · Dynamic Gait Index:  19/24.   (A score less than or equal to19/24 is abnormal and predictive of falls) · [facility] [employer] Sensory Organization Test:  NT Body Structures Involved: 1. Nerves 2. Eyes and Ears 3. Muscles Body Functions Affected: 1. Sensory/Pain 2. Neuromusculoskeletal 
3. Movement Related Activities and Participation Affected: 1. General Tasks and Demands 2. Mobility 3. Domestic Life 4. Community, Social and [place] Number of elements (examined above) that affect the Plan of Care: 3: MODERATE COMPLEXITY CLINICAL PRESENTATION:  
Presentation: Evolving clinical presentation with changing clinical characteristics: MODERATE COMPLEXITY CLINICAL DECISION MAKING:  
Outcome Measure: Tool Used: [facility] Balance Scale Score:  Initial: 50/56 Most Recent: X/56 (Date: -- ) Interpretation of Score: Each section is scored on a 0-4 scale, 0 representing the patients inability to perform the task and 4 representing independence. The scores of each section are added together for a total score of 56. The higher the patients score, the more independent the patient is. Any score below 45 indicates increased risk for falls. Tool Used: Dynamic Gait Index Score:  Initial: 19/24 Most Recent: X/24 (Date: -- ) Interpretation of Score: Each section is scored on a 0-3 scale, 0 representing the patients inability to perform the task and 3 representing independence. The scores of each section are added together for a total score of 24. Any score below 19 indicates increased risk for falls. Medical Necessity:  
· Patient is expected to demonstrate progress in strength, balance and functional technique to improve safety during daily activities. Reason for Services/Other Comments: 
· Patient continues to demonstrate capacity to improve strength, balance, gait, mobility which will increase independence and increase safety. Use of outcome tool(s) and clinical judgement create a POC that gives a: Questionable prediction of patient's progress: MODERATE COMPLEXITY  
  
 
 
 
TREATMENT:  
(In addition to Assessment/Re-Assessment sessions the following treatments were rendered) Pre-treatment Symptoms/Complaints:  No dizziness, no vision loss. Carrying cane outside just in case. Pain: Initial:  
Pain Intensity 1: 0  Post Session:  0  
 
THERAPEUTIC EXERCISE: (10 minutes):  Exercises per grid below to improve mobility, strength and balance. Required minimal verbal cues to promote proper body alignment, promote proper body posture and promote proper body mechanics. Progressed resistance, repetitions and complexity of movement as indicated. Date: 
2/15/19 Date: 
 Date: Activity/Exercise Parameters Parameters Parameters Sit to stand X 10 reps no UE assist    
Standing toe raises X 20 reps B Seated L ankle eversion Yellow band x 20 reps    
marching 4 laps in hallway Single leg stance 5 sec x 4 reps L LE    
     
     
 
 
 
NEUROMUSCULAR RE-EDUCATION: ( minutes):  Exercise/activities per grid below to improve balance, coordination, kinesthetic sense, posture and proprioception. Required minimal verbal cues to promote static and dynamic balance in standing. [employer] 
Treatment/Session Assessment:   
· Response to Treatment:  Patient tolerated session well. He verbalizes that he has improved a lot since stroke. Patient would benefit from L ankle strengthening, coordination training, higher level balance training, uneven terrain, and dual task activities to challenge balance. . 
· Compliance with Program/Exercises: Will assess as treatment progresses. · Recommendations/Intent for next treatment session: \"Next visit will focus on advancements to more challenging activities\". Total Treatment Duration: PT Patient Time In/Time Out Time In: 1613 Time Out: 1438 Jennifer Boogie PT Future Appointments Date Time Provider Swapnil Olson 2/20/2019 10:00 AM MD [facility] Alan  
2/22/2019  8:15 AM Chio Nj PT [facility] ALESSIO  
2/26/2019  9:45 AM ANA Zaldivar E  
 2/26/2019 10:30 AM Markos Manley, OT [facility]  
3/5/2019 12:30 PM Venice Close, PT [facility]  
3/5/2019  1:15 PM Markos Manley, OT [facility]  
3/12/2019  9:45 AM Venice Close, PT [facility]  
3/12/2019 10:30 AM Markos Manley, OT [facility]  
3/19/2019  1:00 PM Venice Close, PT [facility]  
3/19/2019  1:45 PM Markos Manley, OT [facility]  
3/26/2019 10:30 AM Venice Close, PT [facility]  
3/26/2019 11:15 AM Markos Manley, OT [facility]  
4/8/2019  2:30 PM Maurice Chaparro MD [facility]

## 2019-02-15 NOTE — THERAPY EVALUATION
Lay Carrington : 1945 [place] Secondary: Roman Dwyer at [id number] [facility], [address], [place] [address]. [phone]   [phone] OUTPATIENT OCCUPATIONAL THERAPY: Initial Assessment 2/15/2019 ICD-10: Treatment Diagnosis:  
Other lack of coordination (R27.8) Muscle weakness (generalized) (M62.81) Precautions/Allergies:  
Penicillins MD Orders: Evaluate and treat MEDICAL/REFERRING DIAGNOSIS:  
Cerebral infarction, unspecified [I63.9] DATE OF ONSET: 2 weeks ago REFERRING PHYSICIAN: Rod aGn MD 
RETURN PHYSICIAN APPOINTMENT: unknown INITIAL ASSESSMENT:  Mr. Angie Johnson presents with decreased strength and coordination of the non-dominant left upper extremity s/p CVA that is affecting his ability to complete activities of daily living independently. Feel he may benefit from skilled occupational therapy to maximize functional independence with activities of daily living. PLAN OF CARE:  
PROBLEM LIST: 
1. Decreased Strength 2. Decreased ADL/Functional Activities 3. Decreased fine and gross motor coordination INTERVENTIONS PLANNED: (Treatment may consist of any combination of the following) 1. Activities of daily living training 2. Manual therapy training 3. Neuromuscular re-eduation 4. Therapeutic activity 5. Therapeutic exercise TREATMENT PLAN: 
Effective Dates: 2/15/2019 TO 2019 (90 days). Frequency/Duration: 2 times a week for 90 Day(s) GOALS: (Goals have been discussed and agreed upon with patient.) Short-Term Functional Goals: Time Frame: 45 days 1. Patient will demonstrate independence with home exercise program for strength and coordination training.  
2. Patient will demonstrate improved fine motor coordination of the left upper extremity as evidenced by completion of the 9-hole peg test in no more than 50 seconds in order to complete texting and typing independently. 3. Patient will carry water bottle throughout the house/clinic without dropping it. Discharge Goals: Time Frame: 90 days 1. Patient will increase left  strength by 3-5 pounds in order to improve functional sustained grasp on object such as plates and cups. 2. Patient will carry coffee cup throughout the house/clinic without dropping or spilling it. 3. Patient will demonstrate improved fine motor coordination of the left upper extremity as evidenced by completion of the 9-hole peg test in no more than 35 seconds in order to complete manipulation of small objects such as pills independently. Rehabilitation Potential For Stated Goals: Good Regarding [place] Patient therapy, I certify that the treatment plan above will be carried out by a therapist or under their direction. Thank you for this referral, 
Juan Carlos Mcfarland OT Referring Physician Signature: Liana Mullen MD _________________________  Date _________ The information in this section was collected on 2/15/19 (except where otherwise noted). OCCUPATIONAL PROFILE & HISTORY:  
History of Present Injury/Illness (Reason for Referral): 
Left sided weakness at the [employer]; went to hospital and had TPA; one and half weeks at [facility] then home. Past Medical History/Comorbidities: Mr. Colten Blankenship  has no past medical history on file. Mr. Colten Blankenship  has no past surgical history on file. Social History/Living Environment:  
Home Environment: Private residence # Steps to Enter: 1 One/Two Story Residence: One story Living Alone: No 
Support Systems: Family member(s), Child(eugenia), Spouse/Significant Other/Partner Current DME Used/Available at Home: Cane, straight, Shower chair Tub or Shower Type: Shower Prior Level of Function/Work/Activity: 
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  
      RIGHT Personal Factors:   
      Sex:  male Age:  68 y.o. Ambulatory/Rehab Services H2 Model Falls Risk Assessment Risk Factors: 
     (1)  Gender [Male] Ability to Rise from Chair: 
     (1)  Pushes up, successful in one attempt Falls Prevention Plan: No modifications necessary Total: (5 or greater = High Risk): 2  
 ©2010 [facility] Patent #[id number]. Federal Law prohibits the replication, distribution or use without written permission from [facility] [employer] Current Medications:   
Current Outpatient Medications:  
  amLODIPine (NORVASC) 5 mg tablet, Take 1 Tab by mouth daily. , Disp: 30 Tab, Rfl: 2 
  aspirin delayed-release 81 mg tablet, Take 1 Tab by mouth daily. , Disp: 30 Tab, Rfl: 0 
  atorvastatin (LIPITOR) 80 mg tablet, Take 1 Tab by mouth nightly., Disp: 30 Tab, Rfl: 2 
  nicotine (NICODERM CQ) 21 mg/24 hr, 1 Patch by TransDERmal route daily for 30 days. , Disp: 30 Patch, Rfl: 0 Date Last Reviewed:  2/15/2019 Complexity Level : Expanded review of therapy/medical records (1-2):  MODERATE COMPLEXITY ASSESSMENT OF OCCUPATIONAL PERFORMANCE:  
ROM:   
      WDLs bilaterally Strength:   
      4+/5 bilateral upper extremities Right  = 80 pounds Left  =64 pounds Balance: · Sitting: Intact · Standing: With support Coordination: · Fine Motor Skills-Upper: Right Intact, Left Impaired(9-hole peg test: R=33 seconds; L=1 minute 7 seconds) · Gross Motor Skills-Upper: Left Impaired, Right Intact Mental Status: · Neurologic State: Alert · Orientation Level: Oriented X4 · Cognition: Appropriate decision making · Perception: Appears intact · Perseveration: No perseveration noted · Safety/Judgement: Insight into deficits Vision: · Tracking: Able to track stimulus in all quadrants w/o difficulty · Acuity: Within Defined Limits · Corrective Lenses: Reading glasses Activities of Daily Living: Basic ADLs (From Assessment) Complex ADLs (From Assessment) Basic ADL Feeding: Independent Oral Facial Hygiene/Grooming: Modified Independent Bathing: Modified independent Type of Bath: Shower Upper Body Dressing: Modified independent Lower Body Dressing: Modified independent Toileting: Modified independent Instrumental ADL Meal Preparation: Moderate assistance Homemaking: Moderate assistance Medication Management: Minimum assistance Grooming/Bathing/Dressing Activities of Daily Living Cognitive Retraining Safety/Judgement: Insight into deficits Functional Transfers Bathroom Mobility: Modified independent Toilet Transfer : Modified independent Shower Transfer: Modified independent Bed/Mat Mobility Rolling: Independent Supine to Sit: Independent Sit to Supine: Independent Sit to Stand: Independent Bed to Chair: Modified independent Scooting: Independent Sensation:  
      Light touch intact bilateral upper extremities; patient reports continued but improving numbness in left hand. Physical Skills Involved: 1. Strength 2. Fine Motor Control 3. Gross Motor Control Cognitive Skills Affected (resulting in the inability to perform in a timely and safe manner): 1. None Psychosocial Skills Affected: 
1. NOne Number of elements that affect the Plan of Care: 1-3:  LOW COMPLEXITY CLINICAL DECISION MAKING:  
Outcome Measure: Tool Used: [address] [address] AM-[facility] Daily Activity Outpatient Short Form Score:  Initial: 48 Most Recent: X (Date: -- ) Interpretation of Tool:  Represents clinically-significant functional categories (i.e., basic and instrumental activities of daily living, fine motor activities). Medical Necessity:  
· Patient demonstrates good rehab potential due to higher previous functional level.  
Reason for Services/Other Comments: 
· Patient continues to require skilled intervention due to decreased independence with activities of daily living. Clinical Decision-Making Assessment:  Uncomplicated at this time. Assessment process, impact of co-morbidities, assessment modification\need for assistance, and selection of interventions: Analytical Complexity:LOW COMPLEXITY  
TREATMENT:  
(In addition to Assessment/Re-Assessment sessions the following treatments were rendered) Pre-treatment Symptoms/Complaints:  Patient states, \"I am so much better than I was a week ago. \" 
Pain: Initial:  
Pain Intensity 1: 0  Post Session:  0/10 Assessment/Reassessment only, no treatment provided today Treatment/Session Assessment:   
· Response to Treatment:  Patient agreeable to goals and plan of care. · Compliance with Program/Exercises: Will assess as treatment progresses · Recommendations/Intent for next treatment session: \"Next visit will focus on advancements to more challenging activities and reduction in assistance provided\". Total Treatment Duration: OT Patient Time In/Time Out Time In: 7389 Time Out: 1355 Luciano Rodriguez, OT

## 2019-02-20 PROBLEM — F10.10 ALCOHOL ABUSE: Status: ACTIVE | Noted: 2019-01-28

## 2019-02-20 PROBLEM — R26.9 GAIT DIFFICULTY: Status: RESOLVED | Noted: 2019-02-04 | Resolved: 2019-02-20

## 2019-02-20 PROBLEM — E78.5 HYPERLIPIDEMIA: Status: ACTIVE | Noted: 2019-01-28

## 2019-02-20 PROBLEM — Z72.0 TOBACCO ABUSE: Status: ACTIVE | Noted: 2019-01-28

## 2019-02-20 PROBLEM — I10 ESSENTIAL HYPERTENSION: Status: ACTIVE | Noted: 2019-01-28

## 2019-02-20 PROBLEM — M31.30 WEGENER'S GRANULOMATOSIS: Status: ACTIVE | Noted: 2019-02-20

## 2019-02-22 ENCOUNTER — HOSPITAL ENCOUNTER (OUTPATIENT)
Dept: PHYSICAL THERAPY | Age: 74
Discharge: HOME | End: 2019-02-22
Payer: MEDICARE

## 2019-02-22 PROCEDURE — 97112 NEUROMUSCULAR REEDUCATION: CPT

## 2019-02-22 PROCEDURE — 97110 THERAPEUTIC EXERCISES: CPT

## 2019-02-22 NOTE — PROGRESS NOTES
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  
Penicillins MD Orders: eval and treat; PT and OT MEDICAL/REFERRING DIAGNOSIS: 
Cerebral infarction, unspecified [I63.9] DATE OF ONSET: 19 REFERRING PHYSICIAN: Oliver Zepeda MD 
RETURN PHYSICIAN APPOINTMENT:   
INITIAL ASSESSMENT:  Mr. Ventura Vogel presents with L ankle weakness, abnormal coordination L LE, abnormal gait pattern, and impaired higher level balance skills. Patient would benefit from PT to address these problems to improve patient's independence and safety with mobility and daily activities. Thank you. PROBLEM LIST (Impacting functional limitations): 1. Decreased Strength 2. Decreased ADL/Functional Activities 3. Decreased Transfer Abilities 4. Decreased Ambulation Ability/Technique 5. Decreased Balance 6. Decreased Activity Tolerance 7. Decreased Flexibility/Joint Mobility 8. Decreased [place] with Home Exercise Program INTERVENTIONS PLANNED: (Treatment may consist of any combination of the following) 1. Balance Exercise 2. Gait Training 3. Home Exercise Program (HEP) 4. Neuromuscular Re-education/Strengthening 5. Therapeutic Activites 6. Therapeutic Exercise/Strengthening 7. Coordination training TREATMENT PLAN: 
Effective Dates: 2/15/2019 TO 5/15/2019 (90 days). Frequency/Duration: 1-2 times a week for 60- 90 Day(s) GOALS: (Goals have been discussed and agreed upon with patient.) Short-Term Functional Goals: Time Frame: 2-4 weeks 1. Patient will demonstrate independence and compliance with home exercise program to improve balance and strength for daily activities. MET 2. Patient will increase his score on the Ross Balance Scale to greater than or equal to 51/56 indicating improved safety and decreased fall risk for daily activities. 3.  
Discharge Goals: Time Frame: 8-12 weeks 1. Patient will increase L ankle strength to greater than or equal to 4+/5 to improve strength for functional mobility activities. 2. Patient will increase his score on the Ross Balance Scale to greater than or equal to 52/56 indicating improved safety and decreased fall risk for daily activities. 3. Patient will increase his score on the dynamic gait index to greater than or equal to 23/24 indicating improved balance and safety for community ambulation. Patient will ambulate with least assistive device over level and unlevel surfaces without evidence of imbalance to improve safety for daily activities. Rehabilitation Potential For Stated Goals: Good The information in this section was collected on 2/15/19 (except where otherwise noted). HISTORY:  
History of Present Injury/Illness (Reason for Referral): S/p CVA with L hemiparesis. Had therapies on 9th floor 2/4/19-2/13/19, discharged to home with wife. No falls. Fatigue improving. Per MD notes: \"Patient presented to [facility] January 28, 2019 with acute onset left-sided weakness difficulty standing. You also complained of mild numbness to his left side. He had no head injury fever and chills shortness of breath chest pain dysuria. He did not have any vision changes alterations in speech, Voice, or swallowing. Patient has history of hypertension hypercholesterolemia, Wegeners granulomatosis. Head CT was negative for acute hemorrhage. Tell her neurologist recommended TPA and was given in the ER.  Patient was admitted And manage for acute CVA and secondary prevention. MRI confirmed a small acute early subacute infarction in the right thalamus. CTA showed moderate to significant stenosis noted in the right internal carotid siphon. Diseases also noted in the V segment of the right vertebral artery with occlusive to near occlusive narrowing. \" 
  
 
 
Past Medical History/Comorbidities: Mr. Irena Myers  has a past medical history of History of stress test (1999), Wegener's granulomatosis, Hypercholesterolemia, Hypertension, and Stroke ([facility] [address] (01/28/2019). Mr. Irena Myers  has a past surgical history that includes hx thoracotomy (2007); hx appendectomy; and hx colonoscopy (2009). Patient has history of hypertension hypercholesterolemia, Wegeners granulomatosis Social History/Living Environment:  
  ; getting grab bar in walk in shower and hand held shower head, has a seat. Used to take dog to dog park. Wants to drive. Used to fix things in the house and a little yard work. Prior Level of Function/Work/Activity: 
Retired from [employer]; goes to dog park daily Dominant Side:  
      RIGHT Previous Treatment Approaches:   
      Inpatient rehab Personal Factors:   
      Sex:  male Age:  68 y.o. Ambulatory/Rehab Services H2 Model Falls Risk Assessment Risk Factors: 
     (1)  Gender [Male] Ability to Rise from Chair: 
     (0)  Ability to rise in a single movement Falls Prevention Plan: No modifications necessary Total: (5 or greater = High Risk): 1 ©2010 [facility] [address] [place] [address] [place] Patent #[id number]. Federal Law prohibits the replication, distribution or use without written permission from [facility] [employer] Current Medications:   
  
Current Outpatient Medications:  
  amLODIPine (NORVASC) 5 mg tablet, Take 1 Tab by mouth daily for 90 days. , Disp: 90 Tab, Rfl: 3 
  atorvastatin (LIPITOR) 80 mg tablet, Take 1 Tab by mouth nightly for 90 days., Disp: 90 Tab, Rfl: 3 
  aspirin delayed-release 81 mg tablet, Take 1 Tab by mouth daily. , Disp: 30 Tab, Rfl: 0 
  nicotine (NICODERM CQ) 21 mg/24 hr, 1 Patch by TransDERmal route daily for 30 days. , Disp: 30 Patch, Rfl: 0 Date Last Reviewed:  2/22/19 Number of Personal Factors/Comorbidities that affect the Plan of Care: 1-2: MODERATE COMPLEXITY EXAMINATION:  
Observation/Orthostatic Postural Assessment:   
      Mild forward head posture Strength:   
      LE STRENGTH:  4+/5 B hip flexion, 4+/5 B hip abduction, 4+/5 B hip adduction, 4+/5 B hip extension, 4+/5 B knee extension, 4+/5 B knee flexion, 4+/5 R 4-/5 L ankle dorsiflexion, 4/5 B ankle plantarflexion, 4/5 B ankle inversion, 4+/5 R L 4-/5 ankle eversion. Functional Mobility:  
      Gait/Ambulation:  Patient ambulates with no AD (carrying single point cane). He demonstrates good L foot clearance without orthotic assistance but has a device from inpatient rehab if ankle dorsiflexion fatigues, as strength as improved since stroke. Patient demonstrates slowed L LE swing phase during gait. When walking with dual task or head motion, patient demonstrates incoordination L LE during swing phase of gait. Transfers:  independent Bed Mobility:  independent Stairs:  With rail Perception:   
      intact Coordination:   
      Decreased L LE, particularly with dual tasking during standing/walking Mental Status:   
      A + O x 3 Vision:   
      No vision loss Sensation:  
      Decreased sensation L LE and UE but very functional.  
Postural Control & Balance:· Ross Balance Scale:  50/56.   (A score less than 45/56 indicates high risk of falls) · Dynamic Gait Index:  19/24.   (A score less than or equal to19/24 is abnormal and predictive of falls) · SeeClickFixitest Sensory Organization Test:  NT Body Structures Involved: 1. Nerves 2. Eyes and Ears 3. Muscles Body Functions Affected: 1. Sensory/Pain 2. Neuromusculoskeletal 
3. Movement Related Activities and Participation Affected: 1. General Tasks and Demands 2. Mobility 3. Domestic Life 4. Community, Social and [place] Number of elements (examined above) that affect the Plan of Care: 3: MODERATE COMPLEXITY CLINICAL PRESENTATION:  
Presentation: Evolving clinical presentation with changing clinical characteristics: MODERATE COMPLEXITY CLINICAL DECISION MAKING:  
Outcome Measure: Tool Used: Frederick Ponds Balance Scale Score:  Initial: 50/56 Most Recent: X/56 (Date: -- ) Interpretation of Score: Each section is scored on a 0-4 scale, 0 representing the patients inability to perform the task and 4 representing independence. The scores of each section are added together for a total score of 56. The higher the patients score, the more independent the patient is. Any score below 45 indicates increased risk for falls. Tool Used: Dynamic Gait Index Score:  Initial: 19/24 Most Recent: X/24 (Date: -- ) Interpretation of Score: Each section is scored on a 0-3 scale, 0 representing the patients inability to perform the task and 3 representing independence. The scores of each section are added together for a total score of 24. Any score below 19 indicates increased risk for falls. Medical Necessity:  
· Patient is expected to demonstrate progress in strength, balance and functional technique to improve safety during daily activities. Reason for Services/Other Comments: 
· Patient continues to demonstrate capacity to improve strength, balance, gait, mobility which will increase independence and increase safety. Use of outcome tool(s) and clinical judgement create a POC that gives a: Questionable prediction of patient's progress: MODERATE COMPLEXITY  
  
 
 
 
TREATMENT:  
(In addition to Assessment/Re-Assessment sessions the following treatments were rendered) Pre-treatment Symptoms/Complaints:  Doing well. No falls.  Did well with exercises. Pain: Initial:  
Pain Intensity 1: 0  Post Session:  0  
 
THERAPEUTIC EXERCISE: (15 minutes):  Exercises per grid below to improve mobility, strength and balance. Required minimal verbal cues to promote proper body alignment, promote proper body posture and promote proper body mechanics. Progressed resistance, repetitions and complexity of movement as indicated. Date: 
2/15/19 Date: 
2/22/19 Date: Activity/Exercise Parameters Parameters Parameters Nustep  Level 4 x 6 minutes Sit to stand X 10 reps no UE assist 2X 10 reps from chair no UE assist   
Standing toe raises X 20 reps B X 20 reps Standing heel raises  X 20 reps Seated L ankle eversion Yellow band x 20 reps X   
marching 4 laps in hallway X Single leg stance 5 sec x 4 reps L LE X Step ups  6 inch step x 10 reps leading with each leg with no rail NEUROMUSCULAR RE-EDUCATION: ( 30 minutes):  Exercise/activities per grid below to improve balance, coordination, kinesthetic sense, posture and proprioception. Required minimal verbal cues to promote static and dynamic balance in standing. Balance/Vestibular Treatment:  
Activity Date 2/22/19 Date Date Date Date Date Activity/Exercise Sets/reps/equipment Sets/reps/ 
equipment Sets/reps/ 
equipment Walking with head turns 4 laps in hallway Walking with head up & down 4 laps in hallway Step overs Over 1/2 foam roll x 20 reps, alternating Step taps 6 inch step x 30 reps, fwd and cross Marching 4 laps in hallway Sidestepping 4 laps in hallway Crossovers 4 laps in hallway [place] Walking 
backwards Tandem walking Weaving in/out of cones Picking up cones Sports cord Axel Hind Kick ball Figure 8s Circles right/left Walking with 360 degree turns Spirals Weight shifting:   
Left & Right Weight shifting: Forward & Backward Static Standing Balance 
 
 sanddune eyes open and closed Standing with feet apart Blue foams eyes open and closed Standing with feet together Standing with feet semitandem Blue foams eyes open and closed Standing with feet tandem Single leg stance Each leg. X1/X2 Viewing exercises Hallpike-Vasu testing for BPPV (Benign Paroxysmal Positional Vertigo) Patterson-Daroff exercises Canalith Repositioning treatment/Epley Maneuver 
for BPPV (Benign Paroxysmal Positional Vertigo) Smart Equ[employer] Training: See scanned report. [employer] 
Treatment/Session Assessment:   
· Response to Treatment:  Patient tolerated session well. Patient demonstrated improving balance and L LE coordination with repetition of activities. Continue to progress as tolerated. · Compliance with Program/Exercises: Compliant all of the time. · Recommendations/Intent for next treatment session: \"Next visit will focus on advancements to more challenging activities\". Total Treatment Duration: PT Patient Time In/Time Out Time In: 0804 Time Out: 7577 Michelet Suh PT Future Appointments Date Time Provider Swapnil Olson 2/26/2019  9:45 AM Anisha Powers, PT [facility]  
2/26/2019 10:30 AM Tika Connelly, OT [facility]  
3/5/2019 12:30 PM Anisha Powers PT [facility]  
3/5/2019  1:15 PM Tika Connelly, OT [facility]  
3/12/2019  9:45 AM Anisha Powers PT [facility]  
3/12/2019 10:30 AM Tika Connelly OT [facility]  
3/19/2019  1:00 PM Anisha Powers, PT [facility]  
3/19/2019  1:45 PM Tika Connelly OT [facility]  
3/26/2019 10:30 AM Anisha Powers, PT [facility]  
 3/26/2019 11:15 AM Carolynn HERNANDEZ, OT [facility]  
4/8/2019  2:30 PM Onur Del Valle MD [facility]  
5/22/2019  8:25 AM MAT [facility]  
5/29/2019  9:00 AM MD [facility] Mendez Ace MAT MAT

## 2019-02-26 ENCOUNTER — HOSPITAL ENCOUNTER (OUTPATIENT)
Dept: PHYSICAL THERAPY | Age: 74
Discharge: HOME | End: 2019-02-26
Payer: MEDICARE

## 2019-02-26 PROCEDURE — 97110 THERAPEUTIC EXERCISES: CPT

## 2019-02-26 PROCEDURE — 97112 NEUROMUSCULAR REEDUCATION: CPT

## 2019-02-26 NOTE — PROGRESS NOTES
Annie Malhotra : 1945 [place] Secondary: Roman Dwyer at [place] [address], [address], [facility] [place]. 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  
Penicillins MD Orders: eval and treat; PT and OT MEDICAL/REFERRING DIAGNOSIS: 
Cerebral infarction, unspecified [I63.9] DATE OF ONSET: 19 REFERRING PHYSICIAN: Laz Stapleton MD 
RETURN PHYSICIAN APPOINTMENT:   
INITIAL ASSESSMENT:  Mr. Jason Frazier presents with L ankle weakness, abnormal coordination L LE, abnormal gait pattern, and impaired higher level balance skills. Patient would benefit from PT to address these problems to improve patient's independence and safety with mobility and daily activities. Thank you. PROBLEM LIST (Impacting functional limitations): 1. Decreased Strength 2. Decreased ADL/Functional Activities 3. Decreased Transfer Abilities 4. Decreased Ambulation Ability/Technique 5. Decreased Balance 6. Decreased Activity Tolerance 7. Decreased Flexibility/Joint Mobility 8. Decreased [place] with Home Exercise Program INTERVENTIONS PLANNED: (Treatment may consist of any combination of the following) 1. Balance Exercise 2. Gait Training 3. Home Exercise Program (HEP) 4. Neuromuscular Re-education/Strengthening 5. Therapeutic Activites 6. Therapeutic Exercise/Strengthening 7. Coordination training TREATMENT PLAN: 
Effective Dates: 2/15/2019 TO 5/15/2019 (90 days). Frequency/Duration: 1-2 times a week for 60- 90 Day(s) GOALS: (Goals have been discussed and agreed upon with patient.) Short-Term Functional Goals: Time Frame: 2-4 weeks 1. Patient will demonstrate independence and compliance with home exercise program to improve balance and strength for daily activities. MET 2. Patient will increase his score on the Ross Balance Scale to greater than or equal to 51/56 indicating improved safety and decreased fall risk for daily activities. 3.  
Discharge Goals: Time Frame: 8-12 weeks 1. Patient will increase L ankle strength to greater than or equal to 4+/5 to improve strength for functional mobility activities. 2. Patient will increase his score on the Ross Balance Scale to greater than or equal to 52/56 indicating improved safety and decreased fall risk for daily activities. 3. Patient will increase his score on the dynamic gait index to greater than or equal to 23/24 indicating improved balance and safety for community ambulation. Patient will ambulate with least assistive device over level and unlevel surfaces without evidence of imbalance to improve safety for daily activities. Rehabilitation Potential For Stated Goals: Good The information in this section was collected on 2/15/19 (except where otherwise noted). HISTORY:  
History of Present Injury/Illness (Reason for Referral): S/p CVA with L hemiparesis. Had therapies on 9th floor 2/4/19-2/13/19, discharged to home with wife. No falls. Fatigue improving. Per MD notes: \"Patient presented to [facility] center January 28, 2019 with acute onset left-sided weakness difficulty standing. You also complained of mild numbness to his left side. He had no head injury fever and chills shortness of breath chest pain dysuria. He did not have any vision changes alterations in speech, Voice, or swallowing. Patient has history of hypertension hypercholesterolemia, Wegeners granulomatosis. Head CT was negative for acute hemorrhage. Tell her neurologist recommended TPA and was given in the ER.  Patient was admitted And manage for acute CVA and secondary prevention. MRI confirmed a small acute early subacute infarction in the right thalamus. CTA showed moderate to significant stenosis noted in the right internal carotid siphon. Diseases also noted in the V segment of the right vertebral artery with occlusive to near occlusive narrowing. \" 
  
 
 
Past Medical History/Comorbidities: Mr. Benson Jain  has a past medical history of History of stress test (1999), Wegener's granulomatosis, Hypercholesterolemia, Hypertension, and Stroke ([facility] [address] (01/28/2019). Mr. Benson Jain  has a past surgical history that includes hx thoracotomy (2007); hx appendectomy; and hx colonoscopy (2009). Patient has history of hypertension hypercholesterolemia, Wegeners granulomatosis Social History/Living Environment:  
  ; getting grab bar in walk in shower and hand held shower head, has a seat. Used to take dog to dog park. Wants to drive. Used to fix things in the house and a little yard work. Prior Level of Function/Work/Activity: 
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  
      RIGHT Previous Treatment Approaches:   
      Inpatient rehab Personal Factors:   
      Sex:  male Age:  68 y.o. Ambulatory/Rehab Services H2 Model Falls Risk Assessment Risk Factors: 
     (1)  Gender [Male] Ability to Rise from Chair: 
     (0)  Ability to rise in a single movement Falls Prevention Plan: No modifications necessary Total: (5 or greater = High Risk): 1 ©2010 [facility] [address] [place] [address] States Patent #[id number]. Federal Law prohibits the replication, distribution or use without written permission from [facility] [employer] Current Medications:   
  
Current Outpatient Medications:  
  amLODIPine (NORVASC) 5 mg tablet, Take 1 Tab by mouth daily for 90 days. , Disp: 90 Tab, Rfl: 3 
  atorvastatin (LIPITOR) 80 mg tablet, Take 1 Tab by mouth nightly for 90 days., Disp: 90 Tab, Rfl: 3 
  aspirin delayed-release 81 mg tablet, Take 1 Tab by mouth daily. , Disp: 30 Tab, Rfl: 0 
  nicotine (NICODERM CQ) 21 mg/24 hr, 1 Patch by TransDERmal route daily for 30 days. , Disp: 30 Patch, Rfl: 0 Date Last Reviewed:  2/26/19 Number of Personal Factors/Comorbidities that affect the Plan of Care: 1-2: MODERATE COMPLEXITY EXAMINATION:  
Observation/Orthostatic Postural Assessment:   
      Mild forward head posture Strength:   
      LE STRENGTH:  4+/5 B hip flexion, 4+/5 B hip abduction, 4+/5 B hip adduction, 4+/5 B hip extension, 4+/5 B knee extension, 4+/5 B knee flexion, 4+/5 R 4-/5 L ankle dorsiflexion, 4/5 B ankle plantarflexion, 4/5 B ankle inversion, 4+/5 R L 4-/5 ankle eversion. Functional Mobility:  
      Gait/Ambulation:  Patient ambulates with no AD (carrying single point cane). He demonstrates good L foot clearance without orthotic assistance but has a device from inpatient rehab if ankle dorsiflexion fatigues, as strength as improved since stroke. Patient demonstrates slowed L LE swing phase during gait. When walking with dual task or head motion, patient demonstrates incoordination L LE during swing phase of gait. Transfers:  independent Bed Mobility:  independent Stairs:  With rail Perception:   
      intact Coordination:   
      Decreased L LE, particularly with dual tasking during standing/walking Mental Status:   
      A + O x 3 Vision:   
      No vision loss Sensation:  
      Decreased sensation L LE and UE but very functional.  
Postural Control & Balance:· Ross Balance Scale:  50/56.   (A score less than 45/56 indicates high risk of falls) · Dynamic Gait Index:  19/24.   (A score less than or equal to19/24 is abnormal and predictive of falls) · FilterSureitest Sensory Organization Test:  NT Body Structures Involved: 1. Nerves 2. Eyes and Ears 3. Muscles Body Functions Affected: 1. Sensory/Pain 2. Neuromusculoskeletal 
3. Movement Related Activities and Participation Affected: 1. General Tasks and Demands 2. Mobility 3. Domestic Life 4. Community, Social and [place] Number of elements (examined above) that affect the Plan of Care: 3: MODERATE COMPLEXITY CLINICAL PRESENTATION:  
Presentation: Evolving clinical presentation with changing clinical characteristics: MODERATE COMPLEXITY CLINICAL DECISION MAKING:  
Outcome Measure: Tool Used: Lina Median Balance Scale Score:  Initial: 50/56 Most Recent: X/56 (Date: -- ) Interpretation of Score: Each section is scored on a 0-4 scale, 0 representing the patients inability to perform the task and 4 representing independence. The scores of each section are added together for a total score of 56. The higher the patients score, the more independent the patient is. Any score below 45 indicates increased risk for falls. Tool Used: Dynamic Gait Index Score:  Initial: 19/24 Most Recent: X/24 (Date: -- ) Interpretation of Score: Each section is scored on a 0-3 scale, 0 representing the patients inability to perform the task and 3 representing independence. The scores of each section are added together for a total score of 24. Any score below 19 indicates increased risk for falls. Medical Necessity:  
· Patient is expected to demonstrate progress in strength, balance and functional technique to improve safety during daily activities. Reason for Services/Other Comments: 
· Patient continues to demonstrate capacity to improve strength, balance, gait, mobility which will increase independence and increase safety. Use of outcome tool(s) and clinical judgement create a POC that gives a: Questionable prediction of patient's progress: MODERATE COMPLEXITY  
  
 
 
 
TREATMENT:  
(In addition to Assessment/Re-Assessment sessions the following treatments were rendered) Pre-treatment Symptoms/Complaints:  Doing well. No falls.  Not using the cane anymore. Pain: Initial:  
Pain Intensity 1: 0  Post Session:  0  
 
THERAPEUTIC EXERCISE: (15 minutes):  Exercises per grid below to improve mobility, strength and balance. Required minimal verbal cues to promote proper body alignment, promote proper body posture and promote proper body mechanics. Progressed resistance, repetitions and complexity of movement as indicated. Date: 
2/15/19 Date: 
2/22/19 Date: 
2/26/19 Activity/Exercise Parameters Parameters Parameters Nustep  Level 4 x 6 minutes Level 4 x 8 minutes Sit to stand X 10 reps no UE assist 2X 10 reps from chair no UE assist 2X 10 reps from chair no UE assist  
Standing toe raises X 20 reps B X 20 reps X 20 reps Standing heel raises  X 20 reps X 20 reps Seated L ankle eversion Yellow band x 20 reps X   
marching 4 laps in hallway X Single leg stance 5 sec x 4 reps L LE X Step ups  6 inch step x 10 reps leading with each leg with no rail 6 inch step x 15 reps leading with each leg with no rail NEUROMUSCULAR RE-EDUCATION: ( 35 minutes):  Exercise/activities per grid below to improve balance, coordination, kinesthetic sense, posture and proprioception. Required minimal verbal cues to promote static and dynamic balance in standing. Balance/Vestibular Treatment:  
Activity Date 2/22/19 Date 2/26/19 Date Date Date Date Activity/Exercise Sets/reps/equipment Sets/reps/ 
equipment Sets/reps/ 
equipment Walking with head turns 4 laps in hallway 4 laps in hallway Walking with head up & down 4 laps in hallway 4 laps in hallway Step overs Over 1/2 foam roll x 20 reps, alternating Over 1/2 foam roll x 20 reps, alternating Step taps 6 inch step x 30 reps, fwd and cross 6 inch step x 30 reps, fwd and cross Marching 4 laps in hallway 4 laps in hallway Sidestepping 4 laps in hallway 4 laps in hallway Crossovers 4 laps in hallway 4 laps in hallway [place] Walking 
backwards Tandem walking Weaving in/out of cones 4 laps in hallway Picking up cones Sports cord [place] Kick ball Figure 8s Circles right/left Walking with 360 degree turns Spirals Weight shifting:   
Left & Right Weight shifting: Forward & Backward Static Standing Balance 
 
 sanddune eyes open and closed sanddune eyes open and closed Standing with feet apart Blue foams eyes open and closed Blue foams eyes open and closed Standing with feet together Standing with feet semitandem Blue foams eyes open and closed Blue foams eyes open and closed Standing with feet tandem Single leg stance Each leg. Each leg. X1/X2 Viewing exercises Hallpike-Vasu testing for BPPV (Benign Paroxysmal Positional Vertigo) Patterson-Daroff exercises Canalith Repositioning treatment/Epley Maneuver 
for BPPV (Benign Paroxysmal Positional Vertigo) Smart Equitest Training: See scanned report. [employer] 
Treatment/Session Assessment:   
· Response to Treatment:  Patient tolerated session well. Balance and L LE coordination are improving. . Continue to progress as tolerated. · Compliance with Program/Exercises: Compliant all of the time. · Recommendations/Intent for next treatment session: \"Next visit will focus on advancements to more challenging activities\". Total Treatment Duration: PT Patient Time In/Time Out Time In: 1745 Time Out: 1025 Michelet Suh PT Future Appointments Date Time Provider Swapnil Olson 3/5/2019 12:30 PM ANA Bautista  
3/5/2019  1:15 PM Tika Connelly OT Located within [facility] ALESSIO  
3/12/2019  9:45 AM ANA Bautista  
 3/12/2019 10:30 AM Clendenin Call, OT SFEORPT SFE  
3/13/2019  1:20 PM Zeina Weinstein MD SSA PP PP  
3/19/2019  1:00 PM Jhon Snow, PT [facility]PT SFE  
3/19/2019  1:45 PM Clendenin Call, OT [facility]FEORPT SFE  
3/26/2019 10:30 AM Jhon Snow, PT [facility]FE  
3/26/2019 11:15 AM Adelaida Call, OT [facility]FEORPT SFE  
4/8/2019  2:30 PM Ray Romero MD SSA PMR PMR  
5/22/2019  8:25 AM MAT LAB [facility] MAT MAT  
5/29/2019  9:00 AM Elvira Pierre MD [facility] MAT MAT

## 2019-02-26 NOTE — PROGRESS NOTES
Mayda Quinones : 1945 [place] Secondary: Roman Dwyer at [place] [id number] [facility], [address], [place] [address]. [phone]   [phone] OUTPATIENT OCCUPATIONAL THERAPY: Daily Note 2019 ICD-10: Treatment Diagnosis:  
Other lack of coordination (R27.8) Muscle weakness (generalized) (M62.81) Precautions/Allergies:  
Penicillins MD Orders: Evaluate and treat MEDICAL/REFERRING DIAGNOSIS:  
Cerebral infarction, unspecified [I63.9] DATE OF ONSET: 2 weeks ago REFERRING PHYSICIAN: Feli Alberts MD 
RETURN PHYSICIAN APPOINTMENT: unknown INITIAL ASSESSMENT:  Mr. Violetta Essex presents with decreased strength and coordination of the non-dominant left upper extremity s/p CVA that is affecting his ability to complete activities of daily living independently. Feel he may benefit from skilled occupational therapy to maximize functional independence with activities of daily living. PLAN OF CARE:  
PROBLEM LIST: 
1. Decreased Strength 2. Decreased ADL/Functional Activities 3. Decreased fine and gross motor coordination INTERVENTIONS PLANNED: (Treatment may consist of any combination of the following) 1. Activities of daily living training 2. Manual therapy training 3. Neuromuscular re-eduation 4. Therapeutic activity 5. Therapeutic exercise TREATMENT PLAN: 
Effective Dates: 2/15/2019 TO 2019 (90 days). Frequency/Duration: 2 times a week for 90 Day(s) GOALS: (Goals have been discussed and agreed upon with patient.) Short-Term Functional Goals: Time Frame: 45 days 1. Patient will demonstrate independence with home exercise program for strength and coordination training.  
2. Patient will demonstrate improved fine motor coordination of the left upper extremity as evidenced by completion of the 9-hole peg test in no more than 50 seconds in order to complete texting and typing independently. 3. Patient will carry water bottle throughout the house/clinic without dropping it. Discharge Goals: Time Frame: 90 days 1. Patient will increase left  strength by 3-5 pounds in order to improve functional sustained grasp on object such as plates and cups. 2. Patient will carry coffee cup throughout the house/clinic without dropping or spilling it. 3. Patient will demonstrate improved fine motor coordination of the left upper extremity as evidenced by completion of the 9-hole peg test in no more than 35 seconds in order to complete manipulation of small objects such as pills independently. Rehabilitation Potential For Stated Goals: Good Regarding [facility] therapy, I certify that the treatment plan above will be carried out by a therapist or under their direction. Thank you for this referral, 
Monica Deutsch OT Referring Physician Signature: Jyotsna Connelly MD _________________________  Date _________ The information in this section was collected on 2/15/19 (except where otherwise noted). OCCUPATIONAL PROFILE & HISTORY:  
History of Present Injury/Illness (Reason for Referral): 
Left sided weakness at the dog park; went to hospital and had TPA; one and half weeks at [facility] then home. Past Medical History/Comorbidities: Mr. Carolina Cain  has a past medical history of History of stress test (1999), Wegener's granulomatosis, Hypercholesterolemia, Hypertension, and Stroke ([facility] [address] (01/28/2019). Mr. Carolina Cain  has a past surgical history that includes hx thoracotomy (2007); hx appendectomy; and hx colonoscopy (2009). Social History/Living Environment:  
  
Prior Level of Function/Work/Activity: 
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  
      RIGHT Personal Factors:   
      Sex:  male Age:  68 y.o. Ambulatory/Rehab Services H2 Model Falls Risk Assessment Risk Factors: (1)  Gender [Male] Ability to Rise from Chair: 
     (1)  Pushes up, successful in one attempt Falls Prevention Plan: No modifications necessary Total: (5 or greater = High Risk): 2  
 ©2010 [facility] [place] States Patent #[id number]. Federal Law prohibits the replication, distribution or use without written permission from [facility] [employer] Current Medications:   
Current Outpatient Medications:  
  amLODIPine (NORVASC) 5 mg tablet, Take 1 Tab by mouth daily for 90 days. , Disp: 90 Tab, Rfl: 3 
  atorvastatin (LIPITOR) 80 mg tablet, Take 1 Tab by mouth nightly for 90 days. , Disp: 90 Tab, Rfl: 3 
  aspirin delayed-release 81 mg tablet, Take 1 Tab by mouth daily. , Disp: 30 Tab, Rfl: 0 
  nicotine (NICODERM CQ) 21 mg/24 hr, 1 Patch by TransDERmal route daily for 30 days. , Disp: 30 Patch, Rfl: 0 Date Last Reviewed:  2/26/2019 Complexity Level : Expanded review of therapy/medical records (1-2):  MODERATE COMPLEXITY ASSESSMENT OF OCCUPATIONAL PERFORMANCE:  
ROM:   
      WDLs bilaterally Strength:   
      4+/5 bilateral upper extremities Right  = 80 pounds Left  =64 pounds Balance:   
       
    
 
Coordination:   
    
 
Mental Status:   
       
    
 
Vision: Activities of Daily Living:  
       
Basic ADLs (From Assessment) Complex ADLs (From Assessment) Grooming/Bathing/Dressing Activities of Daily Living Sensation:  
      Light touch intact bilateral upper extremities; patient reports continued but improving numbness in left hand. Physical Skills Involved: 1. Strength 2. Fine Motor Control 3. Gross Motor Control Cognitive Skills Affected (resulting in the inability to perform in a timely and safe manner): 1. None Psychosocial Skills Affected: 
1. NOne Number of elements that affect the Plan of Care: 1-3:  LOW COMPLEXITY CLINICAL DECISION MAKING:  
 Outcome Measure: Tool Used: [facility] AM-PACTM Daily Activity Outpatient Short Form Score:  Initial: 48 Most Recent: X (Date: -- ) Interpretation of Tool:  Represents clinically-significant functional categories (i.e., basic and instrumental activities of daily living, fine motor activities). Medical Necessity:  
· Patient demonstrates good rehab potential due to higher previous functional level. Reason for Services/Other Comments: 
· Patient continues to require skilled intervention due to decreased independence with activities of daily living. Clinical Decision-Making Assessment:  Uncomplicated at this time. Assessment process, impact of co-morbidities, assessment modification\need for assistance, and selection of interventions: Analytical Complexity:LOW COMPLEXITY  
TREATMENT:  
(In addition to Assessment/Re-Assessment sessions the following treatments were rendered) Pre-treatment Symptoms/Complaints:  Patient states, \"I keep getting better and better. \" 
Pain: Initial:  
Pain Intensity 1: 0  Post Session:  0/10 Therapeutic Exercise: (  10 minutes):  Exercises per grid below to improve strength of left hand. Required minimal visual and verbal cues to promote proper body mechanics. Progressed resistance as indicated. Date: 
2/26/19 Date: 
 Date: Activity/Exercise Parameters Parameters Parameters Hand Outing 35 pounds 2 x 15 Medium pink theraputty Issued for HEP - full fist and pinch 2 x 10 each Neuromuscular Re-education: (  35 minutes):  Exercise/activities per grid below to improve coordination, kinesthetic sense and proprioception. Required moderate verbal cues to promote coordination of left, upper extremity(s).  
 
Patient placed 10 pegs, sleeves and washers into the Jw pegboard and then removed each set and placed the items in their respective containers one at a time using his left hand only. He then participated in Blink card game, dealing cards using his left hand then holding up to 3 cards in his hand while matching cards based on color, shape and number as quickly as possible with the goals of improving fine motor coordination and divided visual attention to task. Treatment/Session Assessment:   
· Response to Treatment:  Patient is demonstrating improving functional use of the non-dominant left hand with increasing in-hand manipulation of small objects. · Compliance with Program/Exercises: Will assess as treatment progresses · Recommendations/Intent for next treatment session: \"Next visit will focus on advancements to more challenging activities and reduction in assistance provided\". Total Treatment Duration: OT Patient Time In/Time Out Time In: 1030 Time Out: 1115 Carlos Johnson OT

## 2019-03-05 ENCOUNTER — HOSPITAL ENCOUNTER (OUTPATIENT)
Dept: PHYSICAL THERAPY | Age: 74
Discharge: HOME | End: 2019-03-05
Payer: MEDICARE

## 2019-03-05 PROCEDURE — 97112 NEUROMUSCULAR REEDUCATION: CPT

## 2019-03-05 PROCEDURE — 97110 THERAPEUTIC EXERCISES: CPT

## 2019-03-05 NOTE — PROGRESS NOTES
Raffi Paiz  : 1945  [place]  Secondary: Roman Dwyer at [place] [address], [facility] [address]  [phone]   [phone]        OUTPATIENT OCCUPATIONAL THERAPY: Daily Note 3/5/2019    ICD-10: Treatment Diagnosis:   Other lack of coordination (R27.8)   Muscle weakness (generalized) (M62.81)     Precautions/Allergies:   Penicillins   MD Orders: Evaluate and treat MEDICAL/REFERRING DIAGNOSIS:   Cerebral infarction, unspecified [I63.9]   DATE OF ONSET: 2 weeks ago   REFERRING PHYSICIAN: Alex Poe MD  RETURN PHYSICIAN APPOINTMENT: unknown      INITIAL ASSESSMENT:   [facility] presents with decreased strength and coordination of the non-dominant left upper extremity s/p CVA that is affecting his ability to complete activities of daily living independently. Feel he may benefit from skilled occupational therapy to maximize functional independence with activities of daily living. PLAN OF CARE:   PROBLEM LIST:  1. Decreased Strength  2. Decreased ADL/Functional Activities  3. Decreased fine and gross motor coordination INTERVENTIONS PLANNED: (Treatment may consist of any combination of the following)  1. Activities of daily living training  2. Manual therapy training  3. Neuromuscular re-eduation  4. Therapeutic activity  5. Therapeutic exercise   TREATMENT PLAN:  Effective Dates: 2/15/2019 TO 2019 (90 days). Frequency/Duration: 2 times a week for 90 Day(s)  GOALS: (Goals have been discussed and agreed upon with patient.)  Short-Term Functional Goals: Time Frame: 45 days  1. Patient will demonstrate independence with home exercise program for strength and coordination training.   2. Patient will demonstrate improved fine motor coordination of the left upper extremity as evidenced by completion of the 9-hole peg test in no more than 50 seconds in order to complete texting and typing independently. 3. Patient will carry water bottle throughout the house/clinic without dropping it. Discharge Goals: Time Frame: 90 days  1. Patient will increase left  strength by 3-5 pounds in order to improve functional sustained grasp on object such as plates and cups. 2. Patient will carry coffee cup throughout the house/clinic without dropping or spilling it. 3. Patient will demonstrate improved fine motor coordination of the left upper extremity as evidenced by completion of the 9-hole peg test in no more than 35 seconds in order to complete manipulation of small objects such as pills independently. Rehabilitation Potential For Stated Goals: Good  Regarding [facility] therapy, I certify that the treatment plan above will be carried out by a therapist or under their direction. Thank you for this referral,  Radha Riggs OT     Referring Physician Signature: Cisco Clark MD _________________________  Date _________            The information in this section was collected on 2/15/19 (except where otherwise noted). OCCUPATIONAL PROFILE & HISTORY:   History of Present Injury/Illness (Reason for Referral):  Left sided weakness at the dog park; went to hospital and had TPA; one and half weeks at [facility] then home. Past Medical History/Comorbidities:   Mr. Catherine Sinha  has a past medical history of History of stress test (1999), Wegener's granulomatosis, Hypercholesterolemia, Hypertension, and Stroke ([facility] [address] (01/28/2019). Mr. Catherine Sinha  has a past surgical history that includes hx thoracotomy (2007); hx appendectomy; and hx colonoscopy (2009). 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      Ambulatory/Rehab Services H2 Model Falls Risk Assessment    Risk Factors:       (1)  Gender [Male] Ability to Rise from Chair:       (1)  Pushes up, successful in one attempt    Falls Prevention Plan:       No modifications necessary   Total: (5 or greater = High Risk): 2    ©2010 [facility] of [facility] Patent #[id number]. Federal Law prohibits the replication, distribution or use without written permission from [facility] of [employer]     Current Medications:    Current Outpatient Medications:     amLODIPine (NORVASC) 5 mg tablet, Take 1 Tab by mouth daily for 90 days. , Disp: 90 Tab, Rfl: 3    atorvastatin (LIPITOR) 80 mg tablet, Take 1 Tab by mouth nightly for 90 days. , Disp: 90 Tab, Rfl: 3    aspirin delayed-release 81 mg tablet, Take 1 Tab by mouth daily. , Disp: 30 Tab, Rfl: 0    nicotine (NICODERM CQ) 21 mg/24 hr, 1 Patch by TransDERmal route daily for 30 days. , Disp: 30 Patch, Rfl: 0            Date Last Reviewed:  3/5/2019   Complexity Level : Expanded review of therapy/medical records (1-2):  MODERATE COMPLEXITY   ASSESSMENT OF OCCUPATIONAL PERFORMANCE:   ROM:          WDLs bilaterally   Strength:          4+/5 bilateral upper extremities  Right  = 80 pounds  Left  =64 pounds  Balance:                   Coordination:           Mental Status:                   Vision:                   Activities of Daily Living:           Basic ADLs (From Assessment) Complex ADLs (From Assessment)         Grooming/Bathing/Dressing Activities of Daily Living                                   Sensation:         Light touch intact bilateral upper extremities; patient reports continued but improving numbness in left hand. Physical Skills Involved:  1. Strength  2. Fine Motor Control  3. Gross Motor Control Cognitive Skills Affected (resulting in the inability to perform in a timely and safe manner):  1. None Psychosocial Skills Affected:  1. NOne   Number of elements that affect the Plan of Care: 1-3:  LOW COMPLEXITY   CLINICAL DECISION MAKING:   Outcome Measure:    Tool Used: [address] [facility] [address] [address] AM-PAC Daily Activity Outpatient Short Form  Score:  Initial: 48 Most Recent: X (Date: -- )   Interpretation of Tool:  Represents clinically-significant functional categories (i.e., basic and instrumental activities of daily living, fine motor activities). Medical Necessity:   · Patient demonstrates good rehab potential due to higher previous functional level. Reason for Services/Other Comments:  · Patient continues to require skilled intervention due to decreased independence with activities of daily living. Clinical Decision-Making Assessment:  Uncomplicated at this time. Assessment process, impact of co-morbidities, assessment modification\need for assistance, and selection of interventions: Analytical Complexity:LOW COMPLEXITY   TREATMENT:   (In addition to Assessment/Re-Assessment sessions the following treatments were rendered)    Pre-treatment Symptoms/Complaints:  Patient states, \"I moved up my appointment with Dr. Johnny Miller; I want to talk to him about driving; I think I am ready. \"  Pain: Initial:   Pain Intensity 1: 0  Post Session:  0/10     Therapeutic Exercise: (  10 minutes):  Exercises per grid below to improve strength of left hand. Required minimal visual and verbal cues to promote proper body mechanics. Progressed resistance as indicated. Date:  2/26/19 Date:  3/5/19 Date:     Activity/Exercise Parameters Parameters Parameters   Hand Kendall 35 pounds   2 x 15 35 pounds   2 x 15    Medium pink theraputty Issued for HEP - full fist and pinch 2 x 10 each     Resistive clothespin  Blue to  30 sponge pieces and place them in container                                Neuromuscular Re-education: (  35 minutes):  Exercise/activities per grid below to improve coordination, kinesthetic sense and proprioception. Required moderate verbal cues to promote coordination of left, upper extremity(s).     Patient placed 10 pegs, sleeves and washers into the Jw pegboard and then removed each set and placed the items in their respective containers one at a time using his left hand only. He then bounced and caught tennis ball with left hand with at least 50% accuracy in catching and force of bounce. He participated in translation of marbles in palm of left hand, then retrieved coins out of container with eyes open and closed demonstrating increasing in-hand manipulation of multiple small objects. Treatment/Session Assessment:    · Response to Treatment:  Patient is demonstrating improving functional use of the non-dominant left hand with increasing strength and coordination. · Compliance with Program/Exercises: Will assess as treatment progresses  · Recommendations/Intent for next treatment session: \"Next visit will focus on advancements to more challenging activities and reduction in assistance provided\".   Total Treatment Duration:  OT Patient Time In/Time Out  Time In: 2908  Time Out: ALFIE Ragland

## 2019-03-05 NOTE — PROGRESS NOTES
Tere Marin  : 1945  [place]  Secondary: Roman Sánchez [address] at [address], [address] [place] [address],[address], [facility] [address]  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 Gage Mendiola presents with L ankle weakness, abnormal coordination L LE, abnormal gait pattern, and impaired higher level balance skills. Patient would benefit from PT to address these problems to improve patient's independence and safety with mobility and daily activities. Thank you. PROBLEM LIST (Impacting functional limitations):  1. Decreased Strength  2. Decreased ADL/Functional Activities  3. Decreased Transfer Abilities  4. Decreased Ambulation Ability/Technique  5. Decreased Balance  6. Decreased Activity Tolerance  7. Decreased Flexibility/Joint Mobility  8. Decreased [place] with Home Exercise Program INTERVENTIONS PLANNED: (Treatment may consist of any combination of the following)  1. Balance Exercise  2. Gait Training  3. Home Exercise Program (HEP)  4. Neuromuscular Re-education/Strengthening  5. Therapeutic Activites  6. Therapeutic Exercise/Strengthening   7. Coordination training   TREATMENT PLAN:  Effective Dates: 2/15/2019 TO 5/15/2019 (90 days). Frequency/Duration: 1-2 times a week for 60- 90 Day(s)  GOALS: (Goals have been discussed and agreed upon with patient.)  Short-Term Functional Goals: Time Frame: 2-4 weeks  1.  Patient will demonstrate independence and compliance with home exercise program to improve balance and strength for daily activities. MET  2. Patient will increase his score on the Ross Balance Scale to greater than or equal to 51/56 indicating improved safety and decreased fall risk for daily activities. 3.   Discharge Goals: Time Frame: 8-12 weeks  1. Patient will increase L ankle strength to greater than or equal to 4+/5 to improve strength for functional mobility activities. 2. Patient will increase his score on the Ross Balance Scale to greater than or equal to 52/56 indicating improved safety and decreased fall risk for daily activities. 3. Patient will increase his score on the dynamic gait index to greater than or equal to 23/24 indicating improved balance and safety for community ambulation. Patient will ambulate with least assistive device over level and unlevel surfaces without evidence of imbalance to improve safety for daily activities. Rehabilitation Potential For Stated Goals: Good              The information in this section was collected on 2/15/19 (except where otherwise noted). HISTORY:   History of Present Injury/Illness (Reason for Referral):  S/p CVA with L hemiparesis. Had therapies on 9th floor 2/4/19-2/13/19, discharged to home with wife. No falls. Fatigue improving. Per MD notes: \"Patient presented to [facility] 28, 2019 with acute onset left-sided weakness difficulty standing. You also complained of mild numbness to his left side. He had no head injury fever and chills shortness of breath chest pain dysuria. He did not have any vision changes alterations in speech, Voice, or swallowing. Patient has history of hypertension hypercholesterolemia, Wegeners granulomatosis. Head CT was negative for acute hemorrhage. Tell her neurologist recommended TPA and was given in the ER. Patient was admitted And manage for acute CVA and secondary prevention.  MRI confirmed a small acute early subacute infarction in the right thalamus. CTA showed moderate to significant stenosis noted in the right internal carotid siphon. Diseases also noted in the V segment of the right vertebral artery with occlusive to near occlusive narrowing. \"         Past Medical History/Comorbidities:   Mr. Almaguer  has a past medical history of History of stress test (1999), Wegener's granulomatosis, Hypercholesterolemia, Hypertension, and Stroke ([facility] [address] (01/28/2019). Mr. Almaguer  has a past surgical history that includes hx thoracotomy (2007); hx appendectomy; and hx colonoscopy (2009). Patient has history of hypertension hypercholesterolemia, Wegeners granulomatosis      Social History/Living Environment:     ; getting grab bar in walk in shower and hand held shower head, has a seat. Used to take dog to dog park. Wants to drive. Used to fix things in the house and a little yard work. 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 Ambulatory/Rehab Services H2 Model Falls Risk Assessment    Risk Factors:       (1)  Gender [Male] Ability to Rise from Chair:       (0)  Ability to rise in a single movement    Falls Prevention Plan:       No modifications necessary   Total: (5 or greater = High Risk): 1    ©2010 [facility] of [employer]. All Rights Reserved. [facility] Patent #[id number]. Federal Law prohibits the replication, distribution or use without written permission from [facility] [employer]     Current Medications:       Current Outpatient Medications:     amLODIPine (NORVASC) 5 mg tablet, Take 1 Tab by mouth daily for 90 days. , Disp: 90 Tab, Rfl: 3    atorvastatin (LIPITOR) 80 mg tablet, Take 1 Tab by mouth nightly for 90 days. , Disp: 90 Tab, Rfl: 3    aspirin delayed-release 81 mg tablet, Take 1 Tab by mouth daily. , Disp: 30 Tab, Rfl: 0    nicotine (NICODERM CQ) 21 mg/24 hr, 1 Patch by TransDERmal route daily for 30 days. , Disp: 30 Patch, Rfl: 0   Date Last Reviewed:  3/5/19   Number of Personal Factors/Comorbidities that affect the Plan of Care: 1-2: MODERATE COMPLEXITY   EXAMINATION:   Observation/Orthostatic Postural Assessment:          Mild forward head posture    Strength:          LE STRENGTH:  4+/5 B hip flexion, 4+/5 B hip abduction, 4+/5 B hip adduction, 4+/5 B hip extension, 4+/5 B knee extension, 4+/5 B knee flexion, 4+/5 R 4-/5 L ankle dorsiflexion, 4/5 B ankle plantarflexion, 4/5 B ankle inversion, 4+/5 R L 4-/5 ankle eversion. Functional Mobility:         Gait/Ambulation:  Patient ambulates with no AD (carrying single point cane). He demonstrates good L foot clearance without orthotic assistance but has a device from inpatient rehab if ankle dorsiflexion fatigues, as strength as improved since stroke. Patient demonstrates slowed L LE swing phase during gait. When walking with dual task or head motion, patient demonstrates incoordination L LE during swing phase of gait. Transfers:  independent        Bed Mobility:  independent        Stairs:  With rail  Perception:          intact  Coordination:          Decreased L LE, particularly with dual tasking during standing/walking  Mental Status:          A + O x 3  Vision:          No vision loss  Sensation:         Decreased sensation L LE and UE but very functional.   Postural Control & Balance:  · Ross Balance Scale:  50/56.   (A score less than 45/56 indicates high risk of falls)     · Dynamic Gait Index:  19/24.   (A score less than or equal to19/24 is abnormal and predictive of falls)     · Smart [employer] Sensory Organization Test:  NT       Body Structures Involved:  1. Nerves  2. Eyes and Ears  3. Muscles Body Functions Affected:  1. Sensory/Pain  2. Neuromusculoskeletal  3.  Movement Related Activities and Participation Affected:  1. General Tasks and Demands  2. Mobility  3. Domestic Life  4. Community, Social and [place]   Number of elements (examined above) that affect the Plan of Care: 3: MODERATE COMPLEXITY   CLINICAL PRESENTATION:   Presentation: Evolving clinical presentation with changing clinical characteristics: MODERATE COMPLEXITY   CLINICAL DECISION MAKING:   Outcome Measure: Tool Used: Ross Balance Scale  Score:  Initial: 50/56 Most Recent: X/56 (Date: -- )   Interpretation of Score: Each section is scored on a 0-4 scale, 0 representing the patients inability to perform the task and 4 representing independence. The scores of each section are added together for a total score of 56. The higher the patients score, the more independent the patient is. Any score below 45 indicates increased risk for falls. Tool Used: Dynamic Gait Index  Score:  Initial: 19/24 Most Recent: X/24 (Date: -- )   Interpretation of Score: Each section is scored on a 0-3 scale, 0 representing the patients inability to perform the task and 3 representing independence. The scores of each section are added together for a total score of 24. Any score below 19 indicates increased risk for falls. Medical Necessity:   · Patient is expected to demonstrate progress in strength, balance and functional technique to improve safety during daily activities. Reason for Services/Other Comments:  · Patient continues to demonstrate capacity to improve strength, balance, gait, mobility which will increase independence and increase safety. Use of outcome tool(s) and clinical judgement create a POC that gives a: Questionable prediction of patient's progress: MODERATE COMPLEXITY            TREATMENT:   (In addition to Assessment/Re-Assessment sessions the following treatments were rendered)  Pre-treatment Symptoms/Complaints: To see Dr. Adolfo Ferrer on Monday. I want to get clearance to drive. Doing well. Walk around neighborhood.  No stumbling or falls.   Pain: Initial:   Pain Intensity 1: 0  Post Session:  0     THERAPEUTIC EXERCISE: (15 minutes):  Exercises per grid below to improve mobility, strength and balance. Required minimal verbal cues to promote proper body alignment, promote proper body posture and promote proper body mechanics. Progressed resistance, repetitions and complexity of movement as indicated. Date:  3/5/19   Activity/Exercise Parameters   Nustep Level 4 x 8 minutes   Sit to stand 2X 10 reps from chair no UE assist   Standing toe raises    Standing heel raises    Seated L ankle eversion    marching    Single leg stance    Step ups 8 inch step x 15 reps leading with each leg with no rail             NEUROMUSCULAR RE-EDUCATION: ( 30 minutes):  Exercise/activities per grid below to improve balance, coordination, kinesthetic sense, posture and proprioception. Required minimal verbal cues to promote static and dynamic balance in standing. Balance/Vestibular Treatment:   Activity   Date  3/5/19   Activity/Exercise   Sets/reps/  equipment   Walking with head turns     4 laps in hallway   Walking with head up & down     4 laps in hallway   Step overs     Over 2 blue foams x 20 reps, alternating   Step taps     8 inch step x 30 reps, fwd and cross   Marching   4 laps in hallway   Sidestepping   4 laps in hallway   Crossovers   4 laps in hallway   [place]      Walking  backwards     4 laps in hallway   Tandem walking   4 laps in hallway   Weaving in/out of cones     4 laps in hallway   Sidestepping over cones     4 laps in hallway with CGA   Sports cord        Red [place] toss      Kick ball      Figure 8s       Circles right/left      Walking with 360 degree turns      Spirals      Weight shifting:    Left & Right        Weight shifting:   Forward & Backward         Static Standing Balance        Standing with feet apart        Standing with feet together        Standing with feet semitandem      Standing with feet tandem      Single leg stance X1/X2 Viewing exercises        Hallpike-McBee testing for BPPV (Benign Paroxysmal Positional Vertigo)        Patterson-Daroff exercises      Canalith Repositioning treatment/Epley Maneuver  for BPPV (Benign Paroxysmal Positional Vertigo)      Smart Equ[employer] Training: See scanned report. [employer]  Treatment/Session Assessment:    · Response to Treatment:  Patient tolerated session well. Balance and coordination are improving. Patient was able to perform more challenging activities today. Continue to progress as tolerated. · Compliance with Program/Exercises: Compliant all of the time. · Recommendations/Intent for next treatment session: \"Next visit will focus on advancements to more challenging activities\".   Total Treatment Duration:  PT Patient Time In/Time Out  Time In: 5720  Time Out: [address], PT    Future Appointments   Date Time Provider Swapnil Lalai   3/12/2019  9:45 AM Lamonte Dueñas, PT [facility]   3/12/2019 10:30 AM Alvaro Garcia, OT [facility]   3/12/2019  1:00 PM Kaylin Toth MD [facility] PMR PMR   3/13/2019  1:20 PM Justin Martinez MD [facility] PP PP   3/19/2019  1:00 PM Lamonte Dueñas, PT [facility]   3/19/2019  1:45 PM Alvaro Oviedo, OT [facility]   3/26/2019 10:30 AM Lamonte Dueñas, PT [facility]   3/26/2019 11:15 AM Alvaroely Garcia, OT [facility]   5/22/2019  8:25 AM MAT LAB [facility] MAT MAT   5/29/2019  9:00 AM Hilda Oppenheim, MD [facility] MAT

## 2019-03-12 ENCOUNTER — HOSPITAL ENCOUNTER (OUTPATIENT)
Dept: PHYSICAL THERAPY | Age: 74
Discharge: HOME | End: 2019-03-12
Payer: MEDICARE

## 2019-03-12 PROCEDURE — 97112 NEUROMUSCULAR REEDUCATION: CPT

## 2019-03-12 PROCEDURE — 97110 THERAPEUTIC EXERCISES: CPT

## 2019-03-12 NOTE — PROGRESS NOTES
Annie Malhotra  : 1945  [place]  Secondary: Roman Sánchez 76 at [address], [address] [place] [address],[address], [facility] [address].  [phone]   [phone]        OUTPATIENT OCCUPATIONAL THERAPY: Daily Note 3/12/2019    ICD-10: Treatment Diagnosis:   Other lack of coordination (R27.8)   Muscle weakness (generalized) (M62.81)     Precautions/Allergies:   Penicillins   MD Orders: Evaluate and treat MEDICAL/REFERRING DIAGNOSIS:   Cerebral infarction, unspecified [I63.9]   DATE OF ONSET: 2 weeks ago   REFERRING PHYSICIAN: Laz Stapleton MD  RETURN PHYSICIAN APPOINTMENT: unknown      INITIAL ASSESSMENT:  Mr. Jason Frazier presents with decreased strength and coordination of the non-dominant left upper extremity s/p CVA that is affecting his ability to complete activities of daily living independently. Feel he may benefit from skilled occupational therapy to maximize functional independence with activities of daily living. PLAN OF CARE:   PROBLEM LIST:  1. Decreased Strength  2. Decreased ADL/Functional Activities  3. Decreased fine and gross motor coordination INTERVENTIONS PLANNED: (Treatment may consist of any combination of the following)  1. Activities of daily living training  2. Manual therapy training  3. Neuromuscular re-eduation  4. Therapeutic activity  5. Therapeutic exercise   TREATMENT PLAN:  Effective Dates: 2/15/2019 TO 2019 (90 days). Frequency/Duration: 2 times a week for 90 Day(s)  GOALS: (Goals have been discussed and agreed upon with patient.)  Short-Term Functional Goals: Time Frame: 45 days  1. Patient will demonstrate independence with home exercise program for strength and coordination training.   2. Patient will demonstrate improved fine motor coordination of the left upper extremity as evidenced by completion of the 9-hole peg test in no more than 50 seconds in order to complete texting and typing independently. 3. Patient will carry water bottle throughout the house/clinic without dropping it. Discharge Goals: Time Frame: 90 days  1. Patient will increase left  strength by 3-5 pounds in order to improve functional sustained grasp on object such as plates and cups. 2. Patient will carry coffee cup throughout the house/clinic without dropping or spilling it. 3. Patient will demonstrate improved fine motor coordination of the left upper extremity as evidenced by completion of the 9-hole peg test in no more than 35 seconds in order to complete manipulation of small objects such as pills independently. Rehabilitation Potential For Stated Goals: Good  Regarding [facility] therapy, I certify that the treatment plan above will be carried out by a therapist or under their direction. Thank you for this referral,  Matt Zhang OT     Referring Physician Signature: Alex Poe MD _________________________  Date _________            The information in this section was collected on 2/15/19 (except where otherwise noted). OCCUPATIONAL PROFILE & HISTORY:   History of Present Injury/Illness (Reason for Referral):  Left sided weakness at the [employer]; went to hospital and had TPA; one and half weeks at [facility] then home. Past Medical History/Comorbidities:   Mr. Dixie Jose  has a past medical history of History of stress test (1999), Wegener's granulomatosis, Hypercholesterolemia, Hypertension, and Stroke ([address] (01/28/2019). Mr. Dixie Jose  has a past surgical history that includes hx thoracotomy (2007); hx appendectomy; and hx colonoscopy (2009). 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      Ambulatory/Rehab Services H2 Model Falls Risk Assessment    Risk Factors:       (1)  Gender [Male] Ability to Rise from Chair:       (1)  Pushes up, successful in one attempt    Falls Prevention Plan:       No modifications necessary   Total: (5 or greater = High Risk): 2    ©2010 [facility] Patent #[id number]. Federal Law prohibits the replication, distribution or use without written permission from [facility] [place]     Current Medications:    Current Outpatient Medications:     amLODIPine (NORVASC) 5 mg tablet, Take 1 Tab by mouth daily for 90 days. , Disp: 90 Tab, Rfl: 3    atorvastatin (LIPITOR) 80 mg tablet, Take 1 Tab by mouth nightly for 90 days. , Disp: 90 Tab, Rfl: 3    aspirin delayed-release 81 mg tablet, Take 1 Tab by mouth daily. , Disp: 30 Tab, Rfl: 0    nicotine (NICODERM CQ) 21 mg/24 hr, 1 Patch by TransDERmal route daily for 30 days. , Disp: 30 Patch, Rfl: 0            Date Last Reviewed:  3/12/2019   Complexity Level : Expanded review of therapy/medical records (1-2):  MODERATE COMPLEXITY   ASSESSMENT OF OCCUPATIONAL PERFORMANCE:   ROM:          WDLs bilaterally   Strength:          4+/5 bilateral upper extremities  Right  = 80 pounds  Left  =64 pounds  Balance:                   Coordination:           Mental Status:                   Vision:                   Activities of Daily Living:           Basic ADLs (From Assessment) Complex ADLs (From Assessment)         Grooming/Bathing/Dressing Activities of Daily Living                                   Sensation:         Light touch intact bilateral upper extremities; patient reports continued but improving numbness in left hand. Physical Skills Involved:  1. Strength  2. Fine Motor Control  3. Gross Motor Control Cognitive Skills Affected (resulting in the inability to perform in a timely and safe manner):  1. None Psychosocial Skills Affected:  1. NOne   Number of elements that affect the Plan of Care: 1-3:  LOW COMPLEXITY   CLINICAL DECISION MAKING:   Outcome Measure:    Tool Used: [employer] AM-PAC Daily Activity Outpatient Short Form  Score:  Initial: 48 Most Recent: X (Date: -- )   Interpretation of Tool:  Represents clinically-significant functional categories (i.e., basic and instrumental activities of daily living, fine motor activities). Medical Necessity:   · Patient demonstrates good rehab potential due to higher previous functional level. Reason for Services/Other Comments:  · Patient continues to require skilled intervention due to decreased independence with activities of daily living. Clinical Decision-Making Assessment:  Uncomplicated at this time. Assessment process, impact of co-morbidities, assessment modification\need for assistance, and selection of interventions: Analytical Complexity:LOW COMPLEXITY   TREATMENT:   (In addition to Assessment/Re-Assessment sessions the following treatments were rendered)    Pre-treatment Symptoms/Complaints:  Patient states, \"I am feeling good. I know my progress is slower now than it was right after the stroke. I understand that; it is just kind of frustrating. \"  Pain: Initial:   Pain Intensity 1: 0  Post Session:  0/10     Therapeutic Exercise: (  10 minutes):  Exercises per grid below to improve strength of left hand. Required minimal visual and verbal cues to promote proper body mechanics. Progressed resistance as indicated. Date:  2/26/19 Date:  3/5/19 Date:  3/12/19   Activity/Exercise Parameters Parameters Parameters   Hand [place] 35 pounds   2 x 15 35 pounds   2 x 15 35 pounds   2 x 15   Medium pink theraputty Issued for HEP - full fist and pinch 2 x 10 each     Resistive clothespin  Blue to  30 sponge pieces and place them in container Blue to  30 sponge pieces and place them in container                               Neuromuscular Re-education: (  30 minutes):  Exercise/activities per grid below to improve coordination, kinesthetic sense and proprioception.   Required moderate verbal cues to promote coordination of left, upper extremity(s). Patient opened small container and removed specific colored beads from a multicolored selection with his left hand to simulate taking pills out of a container. He then bounced and caught tennis ball with left hand, then alternating throwing and catching with left and right hands with at least 75% accuracy in catching and force of bounce x 10 minutes. He participated in Blink card game to challenge his fine motor coordination and divided attention, dealing cards alternately with his left hand, then playing cards as quickly as possible matching cards based on color, shape or number with moderate cuing to recall categories. Treatment/Session Assessment:    · Response to Treatment:  Patient is demonstrating improving functional use of the non-dominant left hand with increasing strength and coordination. He reports independence at home with management of his dog on a leash and going to the dog park. · Compliance with Program/Exercises: Will assess as treatment progresses  · Recommendations/Intent for next treatment session: \"Next visit will focus on advancements to more challenging activities and reduction in assistance provided\".   Total Treatment Duration:  OT Patient Time In/Time Out  Time In: 1035  Time Out: [address] [facility], OT

## 2019-03-12 NOTE — PROGRESS NOTES
Kenzie Sofia  : 1945  [place]  Secondary: Roman Dwyer at [address], [address] [place] [address],[address], [place] [address].  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 Nona Rich presents with L ankle weakness, abnormal coordination L LE, abnormal gait pattern, and impaired higher level balance skills. Patient would benefit from PT to address these problems to improve patient's independence and safety with mobility and daily activities. Thank you. PROBLEM LIST (Impacting functional limitations):  1. Decreased Strength  2. Decreased ADL/Functional Activities  3. Decreased Transfer Abilities  4. Decreased Ambulation Ability/Technique  5. Decreased Balance  6. Decreased Activity Tolerance  7. Decreased Flexibility/Joint Mobility  8. Decreased [place] with Home Exercise Program INTERVENTIONS PLANNED: (Treatment may consist of any combination of the following)  1. Balance Exercise  2. Gait Training  3. Home Exercise Program (HEP)  4. Neuromuscular Re-education/Strengthening  5. Therapeutic Activites  6. Therapeutic Exercise/Strengthening   7. Coordination training   TREATMENT PLAN:  Effective Dates: 2/15/2019 TO 5/15/2019 (90 days). Frequency/Duration: 1-2 times a week for 60- 90 Day(s)  GOALS: (Goals have been discussed and agreed upon with patient.)  Short-Term Functional Goals: Time Frame: 2-4 weeks  1.  Patient will demonstrate independence and compliance with home exercise program to improve balance and strength for daily activities. MET  2. Patient will increase his score on the Ross Balance Scale to greater than or equal to 51/56 indicating improved safety and decreased fall risk for daily activities. 3.   Discharge Goals: Time Frame: 8-12 weeks  1. Patient will increase L ankle strength to greater than or equal to 4+/5 to improve strength for functional mobility activities. 2. Patient will increase his score on the Ross Balance Scale to greater than or equal to 52/56 indicating improved safety and decreased fall risk for daily activities. 3. Patient will increase his score on the dynamic gait index to greater than or equal to 23/24 indicating improved balance and safety for community ambulation. Patient will ambulate with least assistive device over level and unlevel surfaces without evidence of imbalance to improve safety for daily activities. Rehabilitation Potential For Stated Goals: Good              The information in this section was collected on 2/15/19 (except where otherwise noted). HISTORY:   History of Present Injury/Illness (Reason for Referral):  S/p CVA with L hemiparesis. Had therapies on 9th floor 2/4/19-2/13/19, discharged to home with wife. No falls. Fatigue improving. Per MD notes: \"Patient presented to [facility] January 28, 2019 with acute onset left-sided weakness difficulty standing. You also complained of mild numbness to his left side. He had no head injury fever and chills shortness of breath chest pain dysuria. He did not have any vision changes alterations in speech, Voice, or swallowing. Patient has history of hypertension hypercholesterolemia, Wegeners granulomatosis. Head CT was negative for acute hemorrhage. Tell her neurologist recommended TPA and was given in the ER. Patient was admitted And manage for acute CVA and secondary prevention.  MRI confirmed a small acute early subacute infarction in the right thalamus. CTA showed moderate to significant stenosis noted in the right internal carotid siphon. Diseases also noted in the V segment of the right vertebral artery with occlusive to near occlusive narrowing. \"         Past Medical History/Comorbidities:   Mr. [facility]  has a past medical history of History of stress test (1999), Wegener's granulomatosis, Hypercholesterolemia, Hypertension, and Stroke ([facility] [address] (01/28/2019). Mr. MONTGOMERY [facility]  has a past surgical history that includes hx thoracotomy (2007); hx appendectomy; and hx colonoscopy (2009). Patient has history of hypertension hypercholesterolemia, Wegeners granulomatosis      Social History/Living Environment:     ; getting grab bar in walk in shower and hand held shower head, has a seat. Used to take dog to dog park. Wants to drive. Used to fix things in the house and a little yard work. 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 Ambulatory/Rehab Services H2 Model Falls Risk Assessment    Risk Factors:       (1)  Gender [Male] Ability to Rise from Chair:       (0)  Ability to rise in a single movement    Falls Prevention Plan:       No modifications necessary   Total: (5 or greater = High Risk): 1    ©2010 [facility] [employer]. All Rights Reserved. [facility] Patent #[id number]. Federal Law prohibits the replication, distribution or use without written permission from [facility] [employer]     Current Medications:       Current Outpatient Medications:     amLODIPine (NORVASC) 5 mg tablet, Take 1 Tab by mouth daily for 90 days. , Disp: 90 Tab, Rfl: 3    atorvastatin (LIPITOR) 80 mg tablet, Take 1 Tab by mouth nightly for 90 days. , Disp: 90 Tab, Rfl: 3    aspirin delayed-release 81 mg tablet, Take 1 Tab by mouth daily. , Disp: 30 Tab, Rfl: 0    nicotine (NICODERM CQ) 21 mg/24 hr, 1 Patch by TransDERmal route daily for 30 days. , Disp: 30 Patch, Rfl: 0   Date Last Reviewed:  3/12/19   Number of Personal Factors/Comorbidities that affect the Plan of Care: 1-2: MODERATE COMPLEXITY   EXAMINATION:   Observation/Orthostatic Postural Assessment:          Mild forward head posture    Strength:          LE STRENGTH:  4+/5 B hip flexion, 4+/5 B hip abduction, 4+/5 B hip adduction, 4+/5 B hip extension, 4+/5 B knee extension, 4+/5 B knee flexion, 4+/5 R 4-/5 L ankle dorsiflexion, 4/5 B ankle plantarflexion, 4/5 B ankle inversion, 4+/5 R L 4-/5 ankle eversion. Functional Mobility:         Gait/Ambulation:  Patient ambulates with no AD (carrying single point cane). He demonstrates good L foot clearance without orthotic assistance but has a device from inpatient rehab if ankle dorsiflexion fatigues, as strength as improved since stroke. Patient demonstrates slowed L LE swing phase during gait. When walking with dual task or head motion, patient demonstrates incoordination L LE during swing phase of gait. Transfers:  independent        Bed Mobility:  independent        Stairs:  With rail  Perception:          intact  Coordination:          Decreased L LE, particularly with dual tasking during standing/walking  Mental Status:          A + O x 3  Vision:          No vision loss  Sensation:         Decreased sensation L LE and UE but very functional.   Postural Control & Balance:  · Ross Balance Scale:  50/56.   (A score less than 45/56 indicates high risk of falls)     · Dynamic Gait Index:  19/24.   (A score less than or equal to19/24 is abnormal and predictive of falls)     · Smart Equitest Sensory Organization Test:  NT       Body Structures Involved:  1. Nerves  2. Eyes and Ears  3. Muscles Body Functions Affected:  1. Sensory/Pain  2. Neuromusculoskeletal  3.  Movement Related Activities and Participation Affected:  1. General Tasks and Demands  2. Mobility  3. Domestic Life  4. Community, Social and [place]   Number of elements (examined above) that affect the Plan of Care: 3: MODERATE COMPLEXITY   CLINICAL PRESENTATION:   Presentation: Evolving clinical presentation with changing clinical characteristics: MODERATE COMPLEXITY   CLINICAL DECISION MAKING:   Outcome Measure: Tool Used: Ross Balance Scale  Score:  Initial: 50/56 Most Recent: X/56 (Date: -- )   Interpretation of Score: Each section is scored on a 0-4 scale, 0 representing the patients inability to perform the task and 4 representing independence. The scores of each section are added together for a total score of 56. The higher the patients score, the more independent the patient is. Any score below 45 indicates increased risk for falls. Tool Used: Dynamic Gait Index  Score:  Initial: 19/24 Most Recent: X/24 (Date: -- )   Interpretation of Score: Each section is scored on a 0-3 scale, 0 representing the patients inability to perform the task and 3 representing independence. The scores of each section are added together for a total score of 24. Any score below 19 indicates increased risk for falls. Medical Necessity:   · Patient is expected to demonstrate progress in strength, balance and functional technique to improve safety during daily activities. Reason for Services/Other Comments:  · Patient continues to demonstrate capacity to improve strength, balance, gait, mobility which will increase independence and increase safety. Use of outcome tool(s) and clinical judgement create a POC that gives a: Questionable prediction of patient's progress: MODERATE COMPLEXITY            TREATMENT:   (In addition to Assessment/Re-Assessment sessions the following treatments were rendered)  Pre-treatment Symptoms/Complaints:  Doing well. No falls. Still getting better but slower than initially.   Pain: Initial:   Pain Intensity 1: 0  Post Session:  0     THERAPEUTIC EXERCISE: (15 minutes):  Exercises per grid below to improve mobility, strength and balance. Required minimal verbal cues to promote proper body alignment, promote proper body posture and promote proper body mechanics. Progressed resistance, repetitions and complexity of movement as indicated. Date:  3/12/19   Activity/Exercise Parameters   Nustep Level 4 x 6 minutes   Sit to stand 2X 10 reps from chair no UE assist   Standing toe raises    Standing heel raises    Seated L ankle eversion    marching    Single leg stance    Step ups 8 inch step x 20 reps leading with each leg with no rail             NEUROMUSCULAR RE-EDUCATION: ( 30 minutes):  Exercise/activities per grid below to improve balance, coordination, kinesthetic sense, posture and proprioception. Required minimal verbal cues to promote static and dynamic balance in standing. Balance/Vestibular Treatment:   Activity   Date  3/12/19   Activity/Exercise   Sets/reps/  equipment   Walking with head turns     4 laps in hallway   Walking with head up & down     4 laps in hallway   Step overs     Over 2 blue foams x 20 reps, alternating   Step taps     8 inch step x 30 reps, fwd and cross   Marching   4 laps in hallway   Sidestepping      Crossovers      [place]   4 laps in hallway   Walking  backwards        Tandem walking      Weaving in/out of cones        Sidestepping over cones        Walking in hallway and up/down ramps   No LOB, decreased swing phase L LE, but good foot clearance most of the time. Standing stepping onto 3 targets (coordination) X 20 reps each LE, then x 20 reps more L LE working on increasing speed. Kick ball      Figure 8s       Circles right/left      Walking with 360 degree turns      Spirals      Weight shifting:    Left & Right        Weight shifting:   Forward & Backward         Static Standing Balance        Standing with feet apart        Standing with feet together        Standing with feet semitandem      Standing with feet tandem      Single leg stance      X1/X2 Viewing exercises        Hallpike-[place] testing for BPPV (Benign Paroxysmal Positional Vertigo)        Patterson-Daroff exercises      Canalith Repositioning treatment/Epley Maneuver  for BPPV (Benign Paroxysmal Positional Vertigo)      Smart Equitest Training: See scanned report. [employer] Portal  Treatment/Session Assessment:    · Response to Treatment:  Patient tolerated session well. Patient demonstrates good balance and improving coordination L LE. L LE is slower with movement, but coordination improves with task practice. Patient is demonstrating improving gait pattern with dual task activities as well. Continue to progress as tolerated. · Compliance with Program/Exercises: Compliant all of the time. · Recommendations/Intent for next treatment session: \"Next visit will focus on advancements to more challenging activities\".   Total Treatment Duration:  PT Patient Time In/Time Out  Time In: 1133  Time Out: [address] [facility],     Future Appointments   Date Time Provider Swapnil Whitney   3/12/2019  1:00 PM Rod Gan MD [facility] PMR PMR   3/13/2019  1:20 PM Yulia Moise MD [facility] PP PP   3/19/2019  1:00 PM Ole Mcguire PT [facility] SFE   3/19/2019  1:45 PM Husam Landry, OT [facility]FE   3/26/2019 10:30 AM Ole Mcguire PT [facility] SFE   3/26/2019 11:15 AM Husam Landry, OT [facility] SFE   5/22/2019  8:25 AM MAT LAB [facility] MAT MAT   5/29/2019  9:00 AM Nick Oneill MD [facility] MAT MAT

## 2019-03-13 ENCOUNTER — HOSPITAL ENCOUNTER (OUTPATIENT)
Dept: GENERAL RADIOLOGY | Age: 74
Discharge: HOME | End: 2019-03-13
Payer: MEDICARE

## 2019-03-13 DIAGNOSIS — M31.30 WEGENER'S GRANULOMATOSIS: ICD-10-CM

## 2019-03-13 PROCEDURE — 71046 X-RAY EXAM CHEST 2 VIEWS: CPT

## 2019-03-19 ENCOUNTER — HOSPITAL ENCOUNTER (OUTPATIENT)
Dept: PHYSICAL THERAPY | Age: 74
Discharge: HOME | End: 2019-03-19
Payer: MEDICARE

## 2019-03-19 PROCEDURE — 97110 THERAPEUTIC EXERCISES: CPT

## 2019-03-19 PROCEDURE — 97112 NEUROMUSCULAR REEDUCATION: CPT

## 2019-03-19 NOTE — PROGRESS NOTES
Kamilla Walker  : 1945  [place]  Secondary: Roman Dwyer at [facility]  [address], [address], [facility].  [phone]   [phone]         OUTPATIENT PHYSICAL THERAPY: Daily Treatment Note 3/19/2019  Pre-treatment Symptoms/Complaints:  \"Doing well. I'm back to driving short distances. Dr. Randy Sagastume seemed to think I was doing pretty well. Will probably seen him again in 3 months. \"  Pain: Initial: Pain Intensity 1: 0 0 Post Session:  0/10   Medications Last Reviewed:  3/19/19  Updated Objective Findings:  None Today   TREATMENT:   Therapeutic Exercise: ( 15 minutes):  Exercises per grid below to improve mobility, strength and balance. Required minimal verbal cues to promote proper body alignment, promote proper body posture and promote proper body mechanics. Progressed resistance, repetitions and complexity of movement as indicated. Date:  3/19/19   Activity/Exercise Parameters   Nustep Level 5 x 6 minutes   Sit to stand 2X 10 reps from chair no UE assist   Standing toe raises     Standing heel raises     Seated L ankle eversion     marching     Single leg stance     Step ups     walking in hallway   x 4 minutes level and unlevel surfaces             NEUROMUSCULAR RE-EDUCATION: (30 minutes):  Exercise/activities per grid below to improve balance, coordination, kinesthetic sense, posture and proprioception. Required minimal verbal cues to promote static and dynamic balance in standing.   Balance/Vestibular Treatment:   Activity    Date  3/19/19   Activity/Exercise    Sets/reps/  equipment   Walking with head turns      4 laps in hallway   Walking with head up & down      4 laps in hallway   Step overs          Step taps          Marching    4 laps in hallway   Walking on heels 4 laps in hallway   Walking on toes 4 laps in hallway       Sidestepping     4 laps in hallway   Crossovers        [place]    4 laps in hallway   Walking  backwards          Tandem walking     4 laps in hallway   Weaving in/out of cones       4 laps in hallway   Sidestepping over cones     2 laps    Walking in hallway and up/down ramps       Standing stepping onto 3 targets (coordination) X 20 reps each LE, then x 20 reps more L LE working on increasing speed. Kick ball        Figure 8s         Circles right/left        Walking with 360 degree turns        Spirals        Weight shifting:    Left & Right        on rockerboard eyes open    Weight shifting: Forward & Backward          on rockerboard eyes open   Static Standing Balance         on rockerboard eyes open and closed; eyes open with head turns. Standing with feet apart           Standing with feet together           Standing with feet semitandem        Standing with feet tandem        Single leg stance        X1/X2 Viewing exercises           Hallpike-Vasu testing for BPPV (Benign Paroxysmal Positional Vertigo)           Patterson-Dardaphne exercises        Canalith Repositioning treatment/Epley Maneuver  for BPPV (Benign Paroxysmal Positional Vertigo)        Smart Equitest Training: See scanned report.                         [employer]  Treatment/Session Summary:    · Response to Treatment:  Patient tolerated session well. L LE coordination is improving with balance and mobility. .   · Communication/Consultation:  None today  · Equipment provided today:  None today  · Recommendations/Intent for next treatment session: Next visit will focus on balance, strengthening, coordination activities. .  Treatment Plan of Care Effective Dates:  2/15/2019 TO 5/15/2019  Total Treatment Billable Duration:  45 minutes  PT Patient Time In/Time Out  Time In: 0658  Time Out: W180  [facility] Joon Mckeon    Future Appointments   Date Time Provider Swapnil Olson   3/19/2019  1:45 PM Vicenta Rangel OT [facility] SFE   3/26/2019 10:30 AM Stef Stone PT [facility] SFE   3/26/2019 11:15 AM Mely HERNANDEZ, OT [facility] SFE   4/2/2019 96:65 AM SFE CT 16 SLICE UNIT 1 SFERCT SFE   5/22/2019  8:25 AM MAT LAB [facility] MAT MAT   5/29/2019  9:00 AM Danya Garcia MD [facility] MAT MAT   6/12/2019  1:00 PM Rosalinda Palm MD [facility] PMR PMR   6/13/2019  2:00 PM Yoseph Munoz NP [facility] PP PP

## 2019-03-19 NOTE — PROGRESS NOTES
Himanshu Workman  : 1945  [place]  Secondary: [facility] 76 [facility] [address], [facility] [address]  [phone]   [phone]       OUTPATIENT OCCUPATIONAL THERAPY: Daily Treatment Note 3/19/2019  Pre-treatment Symptoms/Complaints:  Patient states, \"I saw Dr. Jaime Vogel; he says I am doing really well. \"  Pain: Initial: Pain Intensity 1: 0  Post Session:  0/10   Medications Last Reviewed:  3/19/2019  Updated Objective Findings:     Date:  2/15/19 Date:  3/19/19 Date:           Left  strength 64 pounds 72 pounds    Left fine motor coordination  (9-hole peg test) 1 minute 7 seconds 40 seconds                                     TREATMENT:   Therapeutic Exercise: (  10 minutes):  Exercises per grid below to improve strength of left hand.  Required minimal visual and verbal cues to promote proper body mechanics.  Progressed resistance as indicated.       Date:  3/5/19 Date:  3/12/19 Date:  3/19/19   Activity/Exercise Parameters Parameters    Hand [place] 35 pounds   2 x 15 35 pounds   2 x 15 40 pounds   2 x 15   Medium pink theraputty        Resistive clothespin Blue to  30 sponge pieces and place them in container Blue to  30 sponge pieces and place them in container Black to  30 sponge pieces and place them in container                                             Neuromuscular Re-education: (  30 minutes):  Exercise/activities per grid below to improve coordination, kinesthetic sense and proprioception.  Required minimal verbal cues to promote coordination of left, upper extremity(s).     Patient bounced and caught tennis ball with left hand, then alternating throwing and catching with left and right hands with at least 75% accuracy in catching and force of bounce x 10 minutes.      Patient placed 10 pegs, sleeves and washers into the Jw pegboard and then removed all of each type of item and placed the items in their respective containers one at a time using his left hand only. [employer]  Treatment/Session Summary:    · Response to Treatment:  Patient is demonstrating significant improvement in left hand strength and coordination. He reports increasing functional use in making and carrying morning coffee. · Communication/Consultation:  None today  · Equipment provided today:  None today  · Recommendations/Intent for next treatment session: Next visit will focus on increasing independence with activities of daily living.   Treatment Plan of Care Effective Dates: 2/15/2019 TO 5/16/2019   Total Treatment Billable Duration:  40 minutes  OT Patient Time In/Time Out  Time In: 2312  Time Out: [address] [facility], OT    Future Appointments   Date Time Provider Swapnil Olson   3/26/2019 10:30 AM Linda Granda PT [facility] SFE   3/26/2019 11:15 AM Wendi Simmons OT [facility] SFE   4/2/2019 31:32 AM SFE CT 16 SLICE UNIT 1 SFERCT SFE   5/22/2019  8:25 AM MAT LAB [facility] MAT MAT   5/29/2019  9:00 AM Roxy Ortez MD [facility] MAT MAT   6/12/2019  1:00 PM Liana Mullen MD [facility] PMR PMR   6/13/2019  2:00 PM Shayan Orta NP [facility] PP PP

## 2019-03-26 ENCOUNTER — HOSPITAL ENCOUNTER (OUTPATIENT)
Dept: PHYSICAL THERAPY | Age: 74
Discharge: HOME | End: 2019-03-26
Payer: MEDICARE

## 2019-03-26 PROCEDURE — 97112 NEUROMUSCULAR REEDUCATION: CPT

## 2019-03-26 PROCEDURE — 97110 THERAPEUTIC EXERCISES: CPT

## 2019-03-26 NOTE — PROGRESS NOTES
Robert Epps  : 1945  [place]  Secondary: Roman Dwyer at [place] [address], [address], [place] [address].  [phone]   [phone]         OUTPATIENT PHYSICAL THERAPY: Daily Treatment Note 3/26/2019  Pre-treatment Symptoms/Complaints:  Doing well. No problems. Probably going to visit family in [facility] next week. Pain: Initial: Pain Intensity 1: 0 0 Post Session:  0/10   Medications Last Reviewed:  3/26/19  Updated Objective Findings:  see discharge note. TREATMENT:   Therapeutic Exercise: ( 15 minutes):  Exercises per grid below to improve mobility, strength and balance. Required minimal verbal cues to promote proper body alignment, promote proper body posture and promote proper body mechanics. Progressed resistance, repetitions and complexity of movement as indicated. Date:  3/26/19   Activity/Exercise Parameters   Nustep Level 5 x 8 minutes   Sit to stand 2X 10 reps from chair no UE assist   Standing toe raises     Standing heel raises     Seated L ankle eversion     marching     Single leg stance     Step ups     walking in hallway   x 4 minutes level and unlevel surfaces             NEUROMUSCULAR RE-EDUCATION: (30 minutes):  Exercise/activities per grid below to improve balance, coordination, kinesthetic sense, posture and proprioception. Required minimal verbal cues to promote static and dynamic balance in standing.   Balance/Vestibular Treatment:   Activity    Date  3/26/19   Activity/Exercise    Sets/reps/  equipment   Walking with head turns      4 laps in hallway   Walking with head up & down      4 laps in hallway   Step overs          Step taps          Marching    4 laps in hallway   Walking on heels 4 laps in hallway   Walking on toes 4 laps in hallway       Sidestepping     4 laps in hallway   Crossovers        Ryan    4 laps in hallway   Walking  backwards          Tandem walking     4 laps in hallway   Weaving in/out of cones       4 laps in hallway   Sidestepping over cones     2 laps    Walking in hallway and up/down ramps       Standing stepping onto 3 targets (coordination) X 20 reps each LE, then x 20 reps more L LE working on increasing speed. Kick ball        Figure 8s         Circles right/left        Walking with 360 degree turns        Spirals        Weight shifting:    Left & Right        on rockerboard eyes open    Weight shifting: Forward & Backward          on rockerboard eyes open   Static Standing Balance         on rockerboard eyes open and closed; eyes open with head turns. Standing with feet apart           Standing with feet together           Standing with feet semitandem        Standing with feet tandem        Single leg stance        X1/X2 Viewing exercises           Hallpike-Vasu testing for BPPV (Benign Paroxysmal Positional Vertigo)           Patterson-Daroff exercises        Canalith Repositioning treatment/Epley Maneuver  for BPPV (Benign Paroxysmal Positional Vertigo)        Smart Equitest Training: See scanned report.                     [employer]  Treatment/Session Summary:    · Response to Treatment:  Patient has progressed well with PT. He has met goals and will discharge today.  .   · Communication/Consultation:  None today  · Equipment provided today:  None today  · Recommendations/Intent for next treatment session: Discharge today  Treatment Plan of Care Effective Dates:  2/15/2019 TO 5/15/2019  Total Treatment Billable Duration:  45 minutes  PT Patient Time In/Time Out  Time In: 56  Time Out: 200 Jocelin Urias, PT    Future Appointments   Date Time Provider Swapnil Olson   4/2/2019 63:75 AM SFE CT 16 SLICE UNIT 1 SFERCT SFE   5/22/2019  8:25 AM MAT LAB [facility] MAT MAT   5/29/2019  9:00 AM MD MAGGI Carroll MAT MAT   6/12/2019  1:00 PM Alex Poe MD [facility] PMR PMR   6/13/2019  2:00 PM Dominique Velez NP [facility] PP PP

## 2019-03-26 NOTE — PROGRESS NOTES
Rachel Frost  : 1945  [place]  Secondary: Roman Dwyer at [place] [address], [address], [place] [address].  [phone]   [phone]       OUTPATIENT OCCUPATIONAL THERAPY: Daily Treatment Note 3/26/2019  Pre-treatment Symptoms/Complaints:  Patient states, \"I feel really good. I am going to keep doing all the exercises you and William Guevara gave me. \"  Pain: Initial: Pain Intensity 1: 0  Post Session:  0/10   Medications Last Reviewed:  3/26/2019  Updated Objective Findings:     Date:  2/15/19 Date:  3/19/19 Date:  3/26/19         Left  strength 64 pounds 72 pounds 74 pounds   Left fine motor coordination  (9-hole peg test) 1 minute 7 seconds 40 seconds 42 seconds                                    TREATMENT:   Therapeutic Exercise: (  10 minutes):  Exercises per grid below to improve strength of left hand.  Required minimal visual and verbal cues to promote proper body mechanics.  Progressed resistance as indicated.       Date:  3/5/19 Date:  3/12/19 Date:  3/19/19 Date:  3/26/19   Activity/Exercise Parameters Parameters     Hand [place] 35 pounds   2 x 15 35 pounds   2 x 15 40 pounds   2 x 15 40 pounds   2 x 15   Medium pink theraputty         Resistive clothespin Blue to  30 sponge pieces and place them in container Blue to  30 sponge pieces and place them in container Black to  30 sponge pieces and place them in container Black to  30 sponge pieces and place them in container                                                 Neuromuscular Re-education: (  30 minutes):  Exercise/activities per grid below to improve coordination, kinesthetic sense and proprioception.  Required no verbal cues to promote coordination of left, upper extremity(s).     Patient bounced and caught tennis ball with left hand, then alternating throwing and catching with left and right hands with at least 100% accuracy in catching and force of bounce x 10 minutes. Patient participated in Spot It game, using left hand to deal and play cards and using divided visual attention to look at 2 cards and find the matching object as quickly as possible. [employer]  Treatment/Session Summary:    · Response to Treatment: Patient is completing all activities of daily living independently at this time. His fine motor coordination and  strength of the left upper extremity continue to improve. · Communication/Consultation:  None today  · Equipment provided today:  None today  · Recommendations/Intent for next treatment session: Discharge from therapy today.   Treatment Plan of Care Effective Dates: 2/15/2019 TO 5/16/2019   Total Treatment Billable Duration:  40 minutes  OT Patient Time In/Time Out  Time In: 7850  Time Out: ALFIE Navarro    Future Appointments   Date Time Provider Swapnil Olson   4/2/2019 50:83 AM SFE CT 16 SLICE UNIT 1 SFERCT SFE   5/22/2019  8:25 AM MAT LAB [facility] MAT MAT   5/29/2019  9:00 AM Sammy Vega MD [facility] MAT MAT   6/12/2019  1:00 PM Tripp Denise MD [facility] PMR PMR   6/13/2019  2:00 PM Valeria Kasper NP [facility] PP PP

## 2019-03-26 NOTE — THERAPY DISCHARGE
Delaney Reyes  : 1945  [place]  Secondary: Roman Sánchez 76 at [place] [address] [facility], [address] [place] [address],[address], [address].  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 Orlin Velasco presents with L ankle weakness, abnormal coordination L LE, abnormal gait pattern, and impaired higher level balance skills. Patient would benefit from PT to address these problems to improve patient's independence and safety with mobility and daily activities. Thank you. Discharge summary: Mr. Orlin Velasco has attended 7 PT sessions for L ankle weakness, abnormal coordination L LE, abnormal gait pattern, and impaired higher level balance skills. Patient has made excellent progress and has met all goals. We will discharge patient at this time. . Thank you. PROBLEM LIST (Impacting functional limitations):  1. Decreased Strength  2. Decreased ADL/Functional Activities  3. Decreased Transfer Abilities  4. Decreased Ambulation Ability/Technique  5. Decreased Balance  6. Decreased Activity Tolerance  7. Decreased Flexibility/Joint Mobility  8. Decreased [place] with Home Exercise Program INTERVENTIONS PLANNED: (Treatment may consist of any combination of the following)  1. Balance Exercise  2. Gait Training  3. Home Exercise Program (HEP)  4. Neuromuscular Re-education/Strengthening  5. Therapeutic Activites  6.  Therapeutic Exercise/Strengthening   7. Coordination training   TREATMENT PLAN:  Effective Dates: 2/15/2019 TO 3/26/2019 Frequency/Duration: discharge  GOALS: (Goals have been discussed and agreed upon with patient.)  Short-Term Functional Goals: Time Frame: 2-4 weeks  1. Patient will demonstrate independence and compliance with home exercise program to improve balance and strength for daily activities. MET  2. Patient will increase his score on the Ross Balance Scale to greater than or equal to 51/56 indicating improved safety and decreased fall risk for daily activities. MET  3. Discharge Goals: Time Frame: 8-12 weeks  1. Patient will increase L ankle strength to greater than or equal to 4+/5 to improve strength for functional mobility activities. MET  2. Patient will increase his score on the Ross Balance Scale to greater than or equal to 52/56 indicating improved safety and decreased fall risk for daily activities. MET  3. Patient will increase his score on the dynamic gait index to greater than or equal to 23/24 indicating improved balance and safety for community ambulation. MET  Patient will ambulate with least assistive device over level and unlevel surfaces without evidence of imbalance to improve safety for daily activities. MET    Rehabilitation Potential For Stated Goals: Good              The information in this section was collected on 2/15/19 (except where otherwise noted). HISTORY:   History of Present Injury/Illness (Reason for Referral):  S/p CVA with L hemiparesis. Had therapies on 9th floor 2/4/19-2/13/19, discharged to home with wife. No falls. Fatigue improving. Per MD notes: \"Patient presented to [facility] January 28, 2019 with acute onset left-sided weakness difficulty standing. You also complained of mild numbness to his left side. He had no head injury fever and chills shortness of breath chest pain dysuria.  He did not have any vision changes alterations in speech, Voice, or swallowing. Patient has history of hypertension hypercholesterolemia, Wegeners granulomatosis. Head CT was negative for acute hemorrhage. Tell her neurologist recommended TPA and was given in the ER. Patient was admitted And manage for acute CVA and secondary prevention. MRI confirmed a small acute early subacute infarction in the right thalamus. CTA showed moderate to significant stenosis noted in the right internal carotid siphon. Diseases also noted in the V segment of the right vertebral artery with occlusive to near occlusive narrowing. \"         Past Medical History/Comorbidities:   Mr. Zoe Emery  has a past medical history of History of stress test (1999), Wegener's granulomatosis, Hypercholesterolemia, Hypertension, and Stroke ([facility] [address] (01/28/2019). Mr. Zoe Emery  has a past surgical history that includes hx thoracotomy (2007); hx appendectomy; and hx colonoscopy (2009). Patient has history of hypertension hypercholesterolemia, Wegeners granulomatosis      Social History/Living Environment:     ; getting grab bar in walk in shower and hand held shower head, has a seat. Used to take dog to dog park. Wants to drive. Used to fix things in the house and a little yard work. 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 Ambulatory/Rehab Services H2 Model Falls Risk Assessment    Risk Factors:       (1)  Gender [Male] Ability to Rise from Chair:       (0)  Ability to rise in a single movement    Falls Prevention Plan:       No modifications necessary   Total: (5 or greater = High Risk): 1    ©2010 [facility] of [employer] [place]. All Rights Reserved. [facility] Patent #[id number].  Federal Law prohibits the replication, distribution or use without written permission from [facility] [place]     Current Medications:       Current Outpatient Medications:     amLODIPine (NORVASC) 5 mg tablet, Take 1 Tab by mouth daily for 90 days. , Disp: 90 Tab, Rfl: 3    atorvastatin (LIPITOR) 80 mg tablet, Take 1 Tab by mouth nightly for 90 days. , Disp: 90 Tab, Rfl: 3    aspirin delayed-release 81 mg tablet, Take 1 Tab by mouth daily. , Disp: 30 Tab, Rfl: 0   Date Last Reviewed:  2/26/19   Number of Personal Factors/Comorbidities that affect the Plan of Care: 1-2: MODERATE COMPLEXITY   EXAMINATION:   Observation/Orthostatic Postural Assessment:          Mild forward head posture    Strength:          LE STRENGTH:  4+/5 B hip flexion, 4+/5 B hip abduction, 4+/5 B hip adduction, 4+/5 B hip extension, 4+/5 B knee extension, 4+/5 B knee flexion, 4+/5 R 4-/5 L ankle dorsiflexion, 4/5 B ankle plantarflexion, 4/5 B ankle inversion, 4+/5 R L 4-/5 ankle eversion. 4+/5 B LE 3/26/19  Functional Mobility:         Gait/Ambulation:  Patient ambulates with no AD (carrying single point cane). He demonstrates good L foot clearance without orthotic assistance but has a device from inpatient rehab if ankle dorsiflexion fatigues, as strength as improved since stroke. Patient demonstrates slowed L LE swing phase during gait. When walking with dual task or head motion, patient demonstrates incoordination L LE during swing phase of gait.         Transfers:  independent        Bed Mobility:  independent        Stairs:  With rail  Perception:          intact  Coordination:          Decreased L LE, particularly with dual tasking during standing/walking  Mental Status:          A + O x 3  Vision:          No vision loss  Sensation:         Decreased sensation L LE and UE but very functional.   Postural Control & Balance:  · Ross Balance Scale:  50/56.   (A score less than 45/56 indicates high risk of falls)     52/56 3/26/19  · Dynamic Gait Index:  19/24.   (A score less than or equal to19/24 is abnormal and predictive of falls)   23/24 3/26/19  · [employer] Sensory Organization Test:  NT       Body Structures Involved:  1. Nerves  2. Eyes and Ears  3. Muscles Body Functions Affected:  1. Sensory/Pain  2. Neuromusculoskeletal  3. Movement Related Activities and Participation Affected:  1. General Tasks and Demands  2. Mobility  3. Domestic Life  4. Community, Social and [place]   Number of elements (examined above) that affect the Plan of Care: 3: MODERATE COMPLEXITY   CLINICAL PRESENTATION:   Presentation: Evolving clinical presentation with changing clinical characteristics: MODERATE COMPLEXITY   CLINICAL DECISION MAKING:   Outcome Measure: Tool Used: Ross Balance Scale  Score:  Initial: 50/56 Most Recent:52/56 (Date: 3/26/19 )   Interpretation of Score: Each section is scored on a 0-4 scale, 0 representing the patients inability to perform the task and 4 representing independence. The scores of each section are added together for a total score of 56. The higher the patients score, the more independent the patient is. Any score below 45 indicates increased risk for falls. Tool Used: Dynamic Gait Index  Score:  Initial: 19/24 Most Recent:23/24 (Date: 3/26/19 )   Interpretation of Score: Each section is scored on a 0-3 scale, 0 representing the patients inability to perform the task and 3 representing independence. The scores of each section are added together for a total score of 24. Any score below 19 indicates increased risk for falls. Medical Necessity:   · Patient is expected to demonstrate progress in strength, balance and functional technique to improve safety during daily activities. Reason for Services/Other Comments:  · Patient continues to demonstrate capacity to improve strength, balance, gait, mobility which will increase independence and increase safety.    Use of outcome tool(s) and clinical judgement create a POC that gives a: Questionable prediction of patient's progress: MODERATE COMPLEXITY

## 2019-03-26 NOTE — THERAPY DISCHARGE
Robert Epps  : 1945  [place]  Secondary: Roman Dwyer at [address], [address] [place] [address],[address], [place] [address].  [phone]   [phone]        OUTPATIENT OCCUPATIONAL THERAPY: Discharge Summary 3/26/2019    ICD-10: Treatment Diagnosis:   Other lack of coordination (R27.8)   Muscle weakness (generalized) (M62.81)     Precautions/Allergies:   Penicillins   MD Orders: Evaluate and treat MEDICAL/REFERRING DIAGNOSIS:   Cerebral infarction, unspecified [I63.9]   DATE OF ONSET: 2 weeks ago   REFERRING PHYSICIAN: Skye Cruz MD  RETURN PHYSICIAN APPOINTMENT: unknown    Discharge Assessment: Mr. Herlinda Fontana has made excellent progress toward his goals; he is demonstrating independence with his home exercise program and his activities of daily living. He will be discharged from occupational therapy today; please re-consult if any future needs arise. Thank you for the opportunity to participate in Mr. [facility]. INITIAL ASSESSMENT:  Mr. Herlinda Fontana presents with decreased strength and coordination of the non-dominant left upper extremity s/p CVA that is affecting his ability to complete activities of daily living independently. Feel he may benefit from skilled occupational therapy to maximize functional independence with activities of daily living. PLAN OF CARE:   PROBLEM LIST:  1. Decreased Strength  2. Decreased ADL/Functional Activities  3. Decreased fine and gross motor coordination INTERVENTIONS PLANNED: (Treatment may consist of any combination of the following)  1. Activities of daily living training  2. Manual therapy training  3. Neuromuscular re-eduation  4. Therapeutic activity  5. Therapeutic exercise   TREATMENT PLAN:  Effective Dates: 2/15/2019 TO 2019 (90 days).   Frequency/Duration: 2 times a week for 90 Day(s)  GOALS: (Goals have been discussed and agreed upon with patient.)  Short-Term Functional Goals: Time Frame: 45 days  1. Patient will demonstrate independence with home exercise program for strength and coordination training. Met  2. Patient will demonstrate improved fine motor coordination of the left upper extremity as evidenced by completion of the 9-hole peg test in no more than 50 seconds in order to complete texting and typing independently. Met  3. Patient will carry water bottle throughout the house/clinic without dropping it. Met  Discharge Goals: Time Frame: 90 days  1. Patient will increase left  strength by 3-5 pounds in order to improve functional sustained grasp on object such as plates and cups. Met  2. Patient will carry coffee cup throughout the house/clinic without dropping or spilling it. Met  3. Patient will demonstrate improved fine motor coordination of the left upper extremity as evidenced by completion of the 9-hole peg test in no more than 35 seconds in order to complete manipulation of small objects such as pills independently. Patient achieved the 9-hole peg test with left hand in 40 seconds on 3/19/19; he has demonstrated independence with in-hand manipulation of small objects. Rehabilitation Potential For Stated Goals: Good  Regarding [facility] therapy, I certify that the treatment plan above will be carried out by a therapist or under their direction. Thank you for this referral,  Leny Cheema OT               The information in this section was collected on 3/26/19 (except where otherwise noted). OCCUPATIONAL PROFILE & HISTORY:        Ambulatory/Rehab Services H2 Model Falls Risk Assessment    Risk Factors:       (1)  Gender [Male] Ability to Rise from Chair:       (1)  Pushes up, successful in one attempt    Falls Prevention Plan:       No modifications necessary   Total: (5 or greater = High Risk): 2    ©2010 [facility] [employer]. All Rights Reserved. [facility] Patent #[id number].  Federal Law prohibits the replication, distribution or use without written permission from [facility] [place] [facility]     Current Medications:    Current Outpatient Medications:     amLODIPine (NORVASC) 5 mg tablet, Take 1 Tab by mouth daily for 90 days. , Disp: 90 Tab, Rfl: 3    atorvastatin (LIPITOR) 80 mg tablet, Take 1 Tab by mouth nightly for 90 days. , Disp: 90 Tab, Rfl: 3    aspirin delayed-release 81 mg tablet, Take 1 Tab by mouth daily. , Disp: 30 Tab, Rfl: 0            Date Last Reviewed:  3/26/2019       ASSESSMENT OF OCCUPATIONAL PERFORMANCE:   ROM:          WDLs bilaterally   Strength:          4+/5 bilateral upper extremities  Right  = 80 pounds  Left  =74 pounds  Balance:             Sitting: Intact  Standing: Intact     Coordination:     Fine Motor Skills-Upper: Left Impaired, Right Intact(9-hole peg test: R=31 seconds; L=42 seconds)  Gross Motor Skills-Upper: Left Intact, Right Intact     Mental Status:             Neurologic State: Alert  Orientation Level: Oriented X4  Cognition: Appropriate decision making  Perception: Appears intact  Perseveration: No perseveration noted  Safety/Judgement: Insight into deficits     Vision:             Tracking: Able to track stimulus in all quadrants w/o difficulty  Acuity: Within Defined Limits  Corrective Lenses: Reading glasses     Activities of Daily Living:           Basic ADLs (From Assessment) Complex ADLs (From Assessment)   Basic ADL  Feeding: Independent  Oral Facial Hygiene/Grooming: Independent  Bathing: Independent  Upper Body Dressing: Independent  Lower Body Dressing: Independent  Toileting: Independent Instrumental ADL  Meal Preparation: Independent  Homemaking: Independent  Medication Management: Independent   Grooming/Bathing/Dressing Activities of Daily Living     Cognitive Retraining  Safety/Judgement: Insight into deficits                 Functional Transfers  Bathroom Mobility: Independent  Toilet Transfer : Independent  Shower Transfer: Independent     Bed/Mat Mobility  Rolling: Independent  Supine to Sit: Independent  Sit to Supine: Independent  Sit to Stand: Independent  Stand to Sit: Independent  Bed to Chair: Independent  Scooting: Independent     Sensation:         Light touch intact bilateral upper extremities; patient reports continued but improving numbness in left hand. CLINICAL DECISION MAKING:   Outcome Measure: Tool Used: [employer] Daily Activity Outpatient Short Form  Score:  Initial: 48 Most Recent: X (Date: -- )   Interpretation of Tool:  Represents clinically-significant functional categories (i.e., basic and instrumental activities of daily living, fine motor activities).                 OT Patient Time In/Time Out  Time In: 1115  Time Out: Arvind Phillip 105, OT

## 2019-04-02 ENCOUNTER — HOSPITAL ENCOUNTER (OUTPATIENT)
Dept: CT IMAGING | Age: 74
Discharge: HOME | End: 2019-04-02
Attending: INTERNAL MEDICINE
Payer: MEDICARE

## 2019-04-02 DIAGNOSIS — M31.30 WEGENER'S DISEASE, PULMONARY: ICD-10-CM

## 2019-04-02 PROCEDURE — 71250 CT THORAX DX C-: CPT

## 2019-05-29 PROBLEM — Z87.891 HISTORY OF SMOKING: Status: ACTIVE | Noted: 2019-01-28

## 2019-06-10 ENCOUNTER — HOSPITAL ENCOUNTER (OUTPATIENT)
Dept: ULTRASOUND IMAGING | Age: 74
Discharge: HOME | End: 2019-06-10
Attending: INTERNAL MEDICINE
Payer: MEDICARE

## 2019-06-10 DIAGNOSIS — Z13.6 SCREENING FOR AAA (ABDOMINAL AORTIC ANEURYSM): ICD-10-CM

## 2019-06-10 PROCEDURE — 93978 VASCULAR STUDY: CPT

## 2019-11-12 ENCOUNTER — HOSPITAL ENCOUNTER (OUTPATIENT)
Dept: CT IMAGING | Age: 74
Discharge: HOME | End: 2019-11-12
Attending: INTERNAL MEDICINE
Payer: MEDICARE

## 2019-11-12 DIAGNOSIS — M31.30 WEGENER'S DISEASE, PULMONARY: ICD-10-CM

## 2019-11-12 PROCEDURE — 71250 CT THORAX DX C-: CPT

## 2020-11-17 ENCOUNTER — HOSPITAL ENCOUNTER (OUTPATIENT)
Dept: GENERAL RADIOLOGY | Age: 75
Discharge: HOME | End: 2020-11-17
Attending: INTERNAL MEDICINE
Payer: MEDICARE

## 2020-11-17 DIAGNOSIS — R07.81 RIB PAIN ON RIGHT SIDE: ICD-10-CM

## 2020-11-17 PROBLEM — R97.20 ELEVATED PSA: Status: ACTIVE | Noted: 2020-11-17

## 2020-11-17 PROCEDURE — 71100 X-RAY EXAM RIBS UNI 2 VIEWS: CPT

## 2020-11-20 NOTE — PROGRESS NOTES
Nondisplaced R lateral 9th rib fracture; no pneumothorax; he is to avoid heavy lifting, as discussed at ov, trial of lidocaine patches otc; he is to call if no improvement or worsening symptoms;

## 2020-11-20 NOTE — PROGRESS NOTES
LMOVM informing pt, per Dr. Polina Soliz, ribs xray showed nondisplaced R lateral 9th rib fracture; no pneumothorax; he is to avoid heavy lifting, as discussed at ov, trial of lidocaine patches otc; he is to call if no improvement or worsening symptoms.

## 2022-01-13 PROBLEM — R04.2 HEMOPTYSIS: Status: ACTIVE | Noted: 2022-01-03

## 2022-01-13 PROBLEM — B44.1 PULMONARY ASPERGILLOSIS (HCC): Status: ACTIVE | Noted: 2022-01-13

## 2022-01-13 PROBLEM — J96.01 ACUTE RESPIRATORY FAILURE WITH HYPOXIA (HCC): Status: ACTIVE | Noted: 2022-01-03

## 2022-03-18 PROBLEM — Z87.891 HISTORY OF SMOKING: Status: ACTIVE | Noted: 2019-01-28

## 2022-03-18 PROBLEM — I63.9 CVA (CEREBRAL VASCULAR ACCIDENT) (HCC): Status: ACTIVE | Noted: 2019-02-04

## 2022-03-19 PROBLEM — M31.30 GRANULOMATOSIS WITH POLYANGIITIS (HCC): Status: ACTIVE | Noted: 2019-02-20

## 2022-03-19 PROBLEM — R97.20 ELEVATED PSA: Status: ACTIVE | Noted: 2020-11-17

## 2022-03-19 PROBLEM — I10 ESSENTIAL HYPERTENSION: Status: ACTIVE | Noted: 2019-01-28

## 2022-03-19 PROBLEM — F10.10 ALCOHOL ABUSE: Status: ACTIVE | Noted: 2019-01-28

## 2022-03-19 PROBLEM — B44.1 PULMONARY ASPERGILLOSIS (HCC): Status: ACTIVE | Noted: 2022-01-13

## 2022-03-19 PROBLEM — J96.01 ACUTE RESPIRATORY FAILURE WITH HYPOXIA (HCC): Status: ACTIVE | Noted: 2022-01-03

## 2022-03-19 PROBLEM — E78.5 HYPERLIPIDEMIA: Status: ACTIVE | Noted: 2019-01-28

## 2022-03-19 PROBLEM — G81.94 LEFT HEMIPARESIS (HCC): Status: ACTIVE | Noted: 2019-02-04

## 2022-03-19 PROBLEM — R44.9 SENSORY DEFICIT, LEFT: Status: ACTIVE | Noted: 2019-02-04

## 2022-03-20 PROBLEM — R04.2 HEMOPTYSIS: Status: ACTIVE | Noted: 2022-01-03

## 2022-05-26 ENCOUNTER — OFFICE VISIT (OUTPATIENT)
Dept: INTERNAL MEDICINE CLINIC | Facility: CLINIC | Age: 77
End: 2022-05-26
Payer: MEDICARE

## 2022-05-26 VITALS
OXYGEN SATURATION: 96 % | HEIGHT: 66 IN | TEMPERATURE: 98.6 F | BODY MASS INDEX: 23.56 KG/M2 | DIASTOLIC BLOOD PRESSURE: 70 MMHG | RESPIRATION RATE: 16 BRPM | HEART RATE: 95 BPM | WEIGHT: 146.6 LBS | SYSTOLIC BLOOD PRESSURE: 130 MMHG

## 2022-05-26 DIAGNOSIS — Z12.5 SCREENING FOR MALIGNANT NEOPLASM OF PROSTATE: ICD-10-CM

## 2022-05-26 DIAGNOSIS — E78.2 MIXED HYPERLIPIDEMIA: ICD-10-CM

## 2022-05-26 DIAGNOSIS — R97.20 ELEVATED PSA: ICD-10-CM

## 2022-05-26 DIAGNOSIS — F17.200 SMOKER: ICD-10-CM

## 2022-05-26 DIAGNOSIS — I10 ESSENTIAL HYPERTENSION: ICD-10-CM

## 2022-05-26 DIAGNOSIS — Z00.00 WELCOME TO MEDICARE PREVENTIVE VISIT: Primary | ICD-10-CM

## 2022-05-26 DIAGNOSIS — B44.1 PULMONARY ASPERGILLOSIS (HCC): ICD-10-CM

## 2022-05-26 PROBLEM — M85.89 OSTEOPENIA OF MULTIPLE SITES: Status: ACTIVE | Noted: 2022-04-20

## 2022-05-26 PROBLEM — M31.30 GRANULOMATOSIS WITH POLYANGIITIS (HCC): Status: ACTIVE | Noted: 2019-02-20

## 2022-05-26 PROBLEM — R91.1 LESION OF RIGHT LUNG: Status: ACTIVE | Noted: 2021-07-13

## 2022-05-26 PROCEDURE — 1036F TOBACCO NON-USER: CPT | Performed by: NURSE PRACTITIONER

## 2022-05-26 PROCEDURE — 99214 OFFICE O/P EST MOD 30 MIN: CPT | Performed by: NURSE PRACTITIONER

## 2022-05-26 PROCEDURE — G0439 PPPS, SUBSEQ VISIT: HCPCS | Performed by: NURSE PRACTITIONER

## 2022-05-26 PROCEDURE — G8427 DOCREV CUR MEDS BY ELIG CLIN: HCPCS | Performed by: NURSE PRACTITIONER

## 2022-05-26 PROCEDURE — 1123F ACP DISCUSS/DSCN MKR DOCD: CPT | Performed by: NURSE PRACTITIONER

## 2022-05-26 PROCEDURE — G8420 CALC BMI NORM PARAMETERS: HCPCS | Performed by: NURSE PRACTITIONER

## 2022-05-26 PROCEDURE — 3017F COLORECTAL CA SCREEN DOC REV: CPT | Performed by: NURSE PRACTITIONER

## 2022-05-26 RX ORDER — ATORVASTATIN CALCIUM 80 MG/1
80 TABLET, FILM COATED ORAL DAILY
Qty: 90 TABLET | Refills: 3 | Status: SHIPPED | OUTPATIENT
Start: 2022-05-26

## 2022-05-26 RX ORDER — CHOLECALCIFEROL (VITAMIN D3) 1250 MCG
1.25 CAPSULE ORAL DAILY
COMMUNITY

## 2022-05-26 ASSESSMENT — ENCOUNTER SYMPTOMS
NAUSEA: 0
VOMITING: 0
CONSTIPATION: 0
COUGH: 0
SHORTNESS OF BREATH: 0
SORE THROAT: 0
WHEEZING: 0
DIARRHEA: 0
ABDOMINAL PAIN: 0
RHINORRHEA: 0

## 2022-05-26 ASSESSMENT — PATIENT HEALTH QUESTIONNAIRE - PHQ9
SUM OF ALL RESPONSES TO PHQ QUESTIONS 1-9: 0
SUM OF ALL RESPONSES TO PHQ QUESTIONS 1-9: 0
SUM OF ALL RESPONSES TO PHQ9 QUESTIONS 1 & 2: 0
SUM OF ALL RESPONSES TO PHQ QUESTIONS 1-9: 0
2. FEELING DOWN, DEPRESSED OR HOPELESS: 0
SUM OF ALL RESPONSES TO PHQ QUESTIONS 1-9: 0
1. LITTLE INTEREST OR PLEASURE IN DOING THINGS: 0

## 2022-05-26 ASSESSMENT — LIFESTYLE VARIABLES
HOW OFTEN DURING THE LAST YEAR HAVE YOU NEEDED AN ALCOHOLIC DRINK FIRST THING IN THE MORNING TO GET YOURSELF GOING AFTER A NIGHT OF HEAVY DRINKING: 0
HOW OFTEN DURING THE LAST YEAR HAVE YOU HAD A FEELING OF GUILT OR REMORSE AFTER DRINKING: 0
HOW OFTEN DO YOU HAVE A DRINK CONTAINING ALCOHOL: 2-3 TIMES A WEEK
HOW OFTEN DURING THE LAST YEAR HAVE YOU FAILED TO DO WHAT WAS NORMALLY EXPECTED FROM YOU BECAUSE OF DRINKING: 0
HOW MANY STANDARD DRINKS CONTAINING ALCOHOL DO YOU HAVE ON A TYPICAL DAY: 3 OR 4
HAVE YOU OR SOMEONE ELSE BEEN INJURED AS A RESULT OF YOUR DRINKING: 0
HAS A RELATIVE, FRIEND, DOCTOR, OR ANOTHER HEALTH PROFESSIONAL EXPRESSED CONCERN ABOUT YOUR DRINKING OR SUGGESTED YOU CUT DOWN: 0
HOW OFTEN DURING THE LAST YEAR HAVE YOU FOUND THAT YOU WERE NOT ABLE TO STOP DRINKING ONCE YOU HAD STARTED: 0

## 2022-05-26 NOTE — PROGRESS NOTES
Medicare Annual Wellness Visit    Saira Pollock is here for Medicare AWV    Assessment & Plan   Welcome to Medicare preventive visit  Patient declines repeat colon cancer screening. Essential hypertension  -     CBC with Auto Differential; Future  -     Comprehensive Metabolic Panel; Future  -     Lipid Panel; Future  -     PSA Screening; Future  Continue current dose of amlodipine. Mixed hyperlipidemia  -     atorvastatin (LIPITOR) 80 MG tablet; Take 1 tablet by mouth daily, Disp-90 tablet, R-3Normal  -     CBC with Auto Differential; Future  -     Comprehensive Metabolic Panel; Future  -     Lipid Panel; Future  -     PSA Screening; Future  Restart atorvastatin today. Pulmonary aspergillosis ([facility] [address]  Continue follow-up with pulmonary and ID as directed. Smoker  Cessation highly encouraged. Elevated PSA  -     PSA Screening; Future  Continue to monitor. Screening for malignant neoplasm of prostate  -     PSA Screening; Future      Recommendations for Preventive Services Due: see orders and patient instructions/AVS.  Recommended screening schedule for the next 5-10 years is provided to the patient in written form: see Patient Instructions/AVS.     Return in 6 months (on 11/26/2022) for ROUTINE FOLLOW-UP, LAB REVIEW & MEDICATION REFILL. Subjective   The following acute and/or chronic problems were also addressed today: HTN, HLD, history CVA, smoker, pulmonary aspergillosis, elevated PSA. BP is well controlled in the office today. Patient denies any chest pain, dizziness, headache, shortness of breath or edema. He has history of CVA. Was previously taking atorvastatin 80 mg daily. He was recommended by pharmacist to stop the atorvastatin when he was started on voriconazole in January 2022 by infectious disease for treatment of pulmonary aspergillosis. He had side effects with the medication and discontinued prior to completing treatment.  An alternative medication was recommended, but patient declines. He was instructed to monitor symptoms and return for any worsening. He never restarted the atorvastatin. Continues routine follow-up with pulmonary and nephrology for Wegener's granulomatosis. PSA has been slightly elevated. He was seen by urology in December 2020 and August 2021. Per office note, PSA values have been within normal range for a male older than 79. Minimal voiding symptoms. Patient declined treatment. Lab Results   Component Value Date    PSA 4.3 (H) 05/19/2022    PSA 4.4 (H) 08/20/2021    PSA 3.3 05/17/2021     He is due for colon cancer screening but declines. Labs reviewed and discussed. Medications refilled as needed during office visit or adjusted based on lab results and/or our discussion as appropriate. See discussion. Patient's complete Health Risk Assessment and screening values have been reviewed and are found in Flowsheets. The following problems were reviewed today and where indicated follow up appointments were made and/or referrals ordered. Positive Risk Factor Screenings with Interventions:        Alcohol Screening:  AUDIT-C:   Alcohol Use: Heavy Drinker    Frequency of Alcohol Consumption: 2-3 times a week    Average Number of Drinks: 3 or 4    Frequency of Binge Drinking: Monthly        A total score of 8 or more is associated with harmful or hazardous drinking. A score of 13 or more in women, and 15 or more in men, is likely to indicate alcohol dependence.     Substance Use - Alcohol Interventions:  Reviewed recommended alcohol consumption guidelines with the patient          General Health and ACP:  General  In general, how would you say your health is?: [place] you get the social and emotional support that you need?: (!) No  Do you have a Living Will?: (!) No    Advance Directives     Power of [facility] Will ACP-Advance Directive ACP-Power of     Not on File Not on File Not on File Not on [facility] Interventions:  · No Living Will: Patient declines ACP discussion/assistance     Review of Systems   Constitutional: Negative for chills and fever. HENT: Negative for congestion, ear pain, rhinorrhea and sore throat. Eyes: Negative for visual disturbance. Respiratory: Negative for cough, shortness of breath and wheezing. Cardiovascular: Negative for chest pain, palpitations and leg swelling. Gastrointestinal: Negative for abdominal pain, constipation, diarrhea, nausea and vomiting. Genitourinary: Negative for dysuria and hematuria. +nocturia   Skin: Negative for rash. Neurological: Negative for dizziness, light-headedness and headaches. Psychiatric/Behavioral: Negative for dysphoric mood. Objective   Vitals:    05/26/22 1254   BP: 130/70   Site: Left Upper Arm   Position: Sitting   Cuff Size: Small Adult   Pulse: 95   Resp: 16   Temp: 98.6 °F (37 °C)   TempSrc: Temporal   SpO2: 96%   Weight: 146 lb 9.6 oz (66.5 kg)   Height: 5' 6\" (1.676 m)      Body mass index is 23.66 kg/m². Physical Exam  Vitals and nursing note reviewed. Constitutional:       General: He is not in acute distress. Appearance: Normal appearance. HENT:      Right Ear: Tympanic membrane, ear canal and external ear normal.      Left Ear: Tympanic membrane, ear canal and external ear normal.      Nose: Nose normal.      Mouth/Throat:      Mouth: Mucous membranes are moist.      Pharynx: Oropharynx is clear. Eyes:      Extraocular Movements: Extraocular movements intact. Pupils: Pupils are equal, round, and reactive to light. Cardiovascular:      Rate and Rhythm: Normal rate and regular rhythm. Pulses:           Dorsalis pedis pulses are 2+ on the right side and 2+ on the left side. Heart sounds: Normal heart sounds. Pulmonary:      Effort: Pulmonary effort is normal. No respiratory distress. Breath sounds: Normal breath sounds.    Abdominal:      General: Bowel sounds are normal.      Palpations: Abdomen is soft. Tenderness: There is no abdominal tenderness. Musculoskeletal:      Right lower leg: No edema. Left lower leg: No edema. Skin:     General: Skin is warm and dry. Neurological:      Mental Status: He is alert and oriented to person, place, and time. Psychiatric:         Mood and Affect: Mood normal.             Allergies   Allergen Reactions    Penicillins Anaphylaxis, Other (See Comments) and Rash     Prior to Visit Medications    Medication Sig Taking?  Authorizing Provider   Cholecalciferol (VITAMIN D3) 1.25 MG (19853 UT) CAPS Take 1.25 capsules by mouth daily Yes Historical Provider, MD   atorvastatin (LIPITOR) 80 MG tablet Take 1 tablet by mouth daily Yes Delemely Mas, APRN - CNP   amLODIPine (NORVASC) 5 MG tablet Take 5 mg by mouth daily Yes Ar Automatic Reconciliation   aspirin 81 MG EC tablet Take 81 mg by mouth daily Yes Ar Automatic Reconciliation       CareTeam (Including outside providers/suppliers regularly involved in providing care):   Patient Care Team:  Vinicius Guillen MD as PCP - Ayala Leal MD as PCP - [facility] Empaneled Provider     Reviewed and updated this visit:  Allergies  Meds  Med Hx  Surg Hx  Soc Hx  Fam Hx

## 2022-05-26 NOTE — PATIENT INSTRUCTIONS
Personalized Preventive Plan for Darron Lopze - 5/26/2022  Medicare offers a range of preventive health benefits. Some of the tests and screenings are paid in full while other may be subject to a deductible, co-insurance, and/or copay. Some of these benefits include a comprehensive review of your medical history including lifestyle, illnesses that may run in your family, and various assessments and screenings as appropriate. After reviewing your medical record and screening and assessments performed today your provider may have ordered immunizations, labs, imaging, and/or referrals for you. A list of these orders (if applicable) as well as your Preventive Care list are included within your After Visit Summary for your review. Other Preventive Recommendations:    · A preventive eye exam performed by an eye specialist is recommended every 1-2 years to screen for glaucoma; cataracts, macular degeneration, and other eye disorders. · A preventive dental visit is recommended every 6 months. · Try to get at least 150 minutes of exercise per week or 10,000 steps per day on a pedometer . · Order or download the FREE \"Exercise & Physical Activity: Your Everyday Guide\" from [employer]. Call 9-542.854.4374 or search [employer] online. · You need 2148-3878 mg of calcium and 9172-3651 IU of vitamin D per day. It is possible to meet your calcium requirement with diet alone, but a vitamin D supplement is usually necessary to meet this goal.  · When exposed to the sun, use a sunscreen that protects against both UVA and UVB radiation with an SPF of 30 or greater. Reapply every 2 to 3 hours or after sweating, drying off with a towel, or swimming. · Always wear a seat belt when traveling in a car. Always wear a helmet when riding a bicycle or motorcycle.

## 2022-07-12 ENCOUNTER — TELEPHONE (OUTPATIENT)
Dept: INTERNAL MEDICINE CLINIC | Facility: CLINIC | Age: 77
End: 2022-07-12

## 2022-07-13 NOTE — TELEPHONE ENCOUNTER
Pt fell while on vacation and has a wound that hasn't been changed. Wanted to know if it can wait to be changed at his visit 07/19? Please advise. Thank you. I am waiting on [facility] to call back with more information.

## 2022-07-14 NOTE — TELEPHONE ENCOUNTER
Attempted to call pt; however, I kept getting the message that my call could not be completed at this time and to try again later at the home number and the mobile number just rings and rings, no vm.

## 2022-07-18 ENCOUNTER — OFFICE VISIT (OUTPATIENT)
Dept: INTERNAL MEDICINE CLINIC | Facility: CLINIC | Age: 77
End: 2022-07-18
Payer: MEDICARE

## 2022-07-18 VITALS
TEMPERATURE: 98.3 F | HEIGHT: 66 IN | BODY MASS INDEX: 23.66 KG/M2 | DIASTOLIC BLOOD PRESSURE: 60 MMHG | OXYGEN SATURATION: 98 % | HEART RATE: 108 BPM | SYSTOLIC BLOOD PRESSURE: 110 MMHG

## 2022-07-18 DIAGNOSIS — F10.10 ALCOHOL ABUSE: ICD-10-CM

## 2022-07-18 DIAGNOSIS — S82.142A CLOSED BICONDYLAR FRACTURE OF LEFT TIBIAL PLATEAU: ICD-10-CM

## 2022-07-18 DIAGNOSIS — Z09 HOSPITAL DISCHARGE FOLLOW-UP: Primary | ICD-10-CM

## 2022-07-18 DIAGNOSIS — S82.302B: ICD-10-CM

## 2022-07-18 DIAGNOSIS — S82.832B: ICD-10-CM

## 2022-07-18 DIAGNOSIS — M85.89 OSTEOPENIA OF MULTIPLE SITES: ICD-10-CM

## 2022-07-18 DIAGNOSIS — D69.6 THROMBOCYTOPENIA (HCC): ICD-10-CM

## 2022-07-18 PROCEDURE — 99214 OFFICE O/P EST MOD 30 MIN: CPT | Performed by: INTERNAL MEDICINE

## 2022-07-18 ASSESSMENT — PATIENT HEALTH QUESTIONNAIRE - PHQ9
2. FEELING DOWN, DEPRESSED OR HOPELESS: 0
SUM OF ALL RESPONSES TO PHQ QUESTIONS 1-9: 0
SUM OF ALL RESPONSES TO PHQ QUESTIONS 1-9: 0
1. LITTLE INTEREST OR PLEASURE IN DOING THINGS: 0
SUM OF ALL RESPONSES TO PHQ QUESTIONS 1-9: 0
SUM OF ALL RESPONSES TO PHQ QUESTIONS 1-9: 0
SUM OF ALL RESPONSES TO PHQ9 QUESTIONS 1 & 2: 0

## 2022-07-18 ASSESSMENT — ENCOUNTER SYMPTOMS
SHORTNESS OF BREATH: 0
RHINORRHEA: 0
BLOOD IN STOOL: 0
COUGH: 0
ABDOMINAL PAIN: 0

## 2022-07-18 NOTE — PROGRESS NOTES
[facility] Primary [facility]      2022    Patient Name: Lauren Luna  :  1945    Subjective:    Chief Complaint:  Chief Complaint   Patient presents with    Fall    Referral - General     ortho         HPI Here for hospital f/u, [facility] open fracture of distal end of fibula and tibia, left, concern for alcohol abuse; s/p I&D with wound VAC placement and empiric IV abx; s/p ORIF of L bicondylar tibial plateau fracture and L tibial tubercle fracture with placement of hinged knee brace; he was discharged home with DME and home health; he was prescribed Ultram 50 mg qid x10 days, advised to continue Lipitor 80 mg qd, ASA 81 mg qd; he is to avoid flexion or weight bearing for 4-6 weeks; records reviewed;    He was on vacation, slipped on the steps going down into the pool; pain is about 4/10; he has been taking Tylenol otc, and doing well; he does not drink alcohol; he has PT, OT and Home Health; he has not had dressing changes yet; he denies fever, chills, has good appetite, has daily bowel movements;     Past Medical History:   Diagnosis Date    Elevated PSA     History of stress test     WNL    Hx of Wegener's granulomatosis     Hypercholesterolemia     Hypertension     Kidney disease     Stroke ([facility] [address] 2019    CVA       Past Surgical History:   Procedure Laterality Date    APPENDECTOMY      COLONOSCOPY  2009    THORACOTOMY  2007    Wegener's granulomatosis    TIBIA FRACTURE SURGERY Left        Family History   Problem Relation Age of Onset    Stroke Brother     Hypertension Brother     Cancer Father         smoker       Social History     Tobacco Use    Smoking status: Former     Packs/day: 1.50     Types: Cigarettes    Smokeless tobacco: Never    Tobacco comments:     Quit smoking: Pt states he hasn't smoked since he went into the hospital 2021   Vaping Use    Vaping Use: Never used   Substance Use Topics    Alcohol use: Not Currently     Alcohol/week: 3.0 standard drinks    Drug use: No                 Current Outpatient Medications:     Cholecalciferol (VITAMIN D3) 1.25 MG (18108 UT) CAPS, Take 1.25 capsules by mouth daily, Disp: , Rfl:     atorvastatin (LIPITOR) 80 MG tablet, Take 1 tablet by mouth daily, Disp: 90 tablet, Rfl: 3    amLODIPine (NORVASC) 5 MG tablet, Take 5 mg by mouth daily, Disp: , Rfl:     aspirin 81 MG EC tablet, Take 81 mg by mouth daily, Disp: , Rfl:     Allergies   Allergen Reactions    Penicillins Anaphylaxis, Other (See Comments) and Rash       Review of Systems   Constitutional:  Negative for chills, fatigue and fever. HENT:  Negative for congestion, postnasal drip and rhinorrhea. Eyes:  Negative for visual disturbance. Respiratory:  Negative for cough and shortness of breath. Cardiovascular:  Negative for chest pain and palpitations. Gastrointestinal:  Negative for abdominal pain and blood in stool. Genitourinary:  Negative for dysuria and frequency. Musculoskeletal:  Positive for gait problem. Negative for arthralgias and myalgias. Skin: Negative. Neurological:  Negative for numbness and headaches. Psychiatric/Behavioral:  Negative for dysphoric mood and sleep disturbance. The patient is not nervous/anxious. All other systems reviewed and are negative. Objective:  /60 (Site: Left Upper Arm, Position: Sitting, Cuff Size: Large Adult)   Pulse (!) 108   Temp 98.3 °F (36.8 °C) (Temporal)   Ht 5' 6\" (1.676 m)   SpO2 98%   BMI 23.66 kg/m²     Examination:  Physical Exam  Vitals reviewed. Constitutional:       Appearance: Normal appearance. HENT:      Head: Normocephalic and atraumatic. Nose: Nose normal.      Mouth/Throat:      Mouth: Mucous membranes are moist.      Pharynx: Oropharynx is clear. Eyes:      Extraocular Movements: Extraocular movements intact. Conjunctiva/sclera: Conjunctivae normal.      Pupils: Pupils are equal, round, and reactive to light.    Cardiovascular: Rate and Rhythm: Normal rate and regular rhythm. Pulses: Normal pulses. Heart sounds: Normal heart sounds. Pulmonary:      Effort: Pulmonary effort is normal.      Breath sounds: Normal breath sounds. Abdominal:      General: Abdomen is flat. Bowel sounds are normal.      Palpations: Abdomen is soft. Musculoskeletal:         General: Normal range of motion. Cervical back: Normal range of motion and neck supple. Comments: LLE immobilized in brace   Skin:     General: Skin is warm and dry. Neurological:      General: No focal deficit present. Mental Status: He is alert and oriented to person, place, and time. Mental status is at baseline. Psychiatric:         Mood and Affect: Mood normal.         Behavior: Behavior normal.         Assessment/Plan:    Kilo Sierra was seen today for fall and referral - general.    Diagnoses and all orders for this visit:    Hospital discharge follow-up  Hospitalization records, including labs, imaging studies, and consults were reviewed with patient today, along with treatment plan and follow-up recommendations. Open fracture of distal end of fibula and tibia, left, type I or II, initial encounter  Patient had recent [place], will refer to establish care with local Orthopedist; Pain well controlled on present medications;    -     Manjula [address] Dr    Closed bicondylar fracture of left tibial plateau  -     Manjula Lee of multiple sites  -     [address]; Future    Alcohol abuse  Patient denies current alcohol use. Thrombocytopenia (HCC)  -     CBC with Auto Differential; Future  -     Comprehensive Metabolic Panel; Future        Follow-up and Dispositions    Return in about 4 weeks (around 8/15/2022), or if symptoms worsen or fail to improve, for f/u w/ labs.          Medication Reconciliation:  Current Medications Verified: Current medications/ immunizations were reviewed, including purpose, with patient. Family History, Social History, Current and Past Medical History was reviewed with patient and updated at today's office visit. Medication Reconciliation list was given to patient/ family. Patient was advised to discard old medication lists and provide all providers with current list at each visit and carry list with them in case of emergency.     Completed By:   Surekha Neves MD    [facility] & [address], [facility] [place]  [place], [address] [facility]  [phone]  [phone] fax  12:51 PM

## 2022-07-19 ENCOUNTER — TELEPHONE (OUTPATIENT)
Dept: ORTHOPEDIC SURGERY | Age: 77
End: 2022-07-19

## 2022-07-20 ENCOUNTER — HOSPITAL ENCOUNTER (EMERGENCY)
Dept: GENERAL RADIOLOGY | Age: 77
Discharge: HOME | End: 2022-07-23
Payer: MEDICARE

## 2022-07-20 ENCOUNTER — HOSPITAL ENCOUNTER (EMERGENCY)
Age: 77
Discharge: HOME | End: 2022-07-20
Attending: EMERGENCY MEDICINE
Payer: MEDICARE

## 2022-07-20 VITALS
RESPIRATION RATE: 16 BRPM | DIASTOLIC BLOOD PRESSURE: 81 MMHG | OXYGEN SATURATION: 97 % | HEART RATE: 108 BPM | WEIGHT: 147 LBS | BODY MASS INDEX: 23.63 KG/M2 | SYSTOLIC BLOOD PRESSURE: 120 MMHG | TEMPERATURE: 98.8 F | HEIGHT: 66 IN

## 2022-07-20 DIAGNOSIS — Z48.89 ENCOUNTER FOR POST SURGICAL WOUND CHECK: Primary | ICD-10-CM

## 2022-07-20 PROCEDURE — 99283 EMERGENCY DEPT VISIT LOW MDM: CPT

## 2022-07-20 PROCEDURE — 73562 X-RAY EXAM OF KNEE 3: CPT

## 2022-07-20 ASSESSMENT — PAIN DESCRIPTION - LOCATION: LOCATION: LEG

## 2022-07-20 ASSESSMENT — PAIN DESCRIPTION - ORIENTATION: ORIENTATION: LEFT

## 2022-07-20 ASSESSMENT — PAIN SCALES - GENERAL: PAINLEVEL_OUTOF10: 7

## 2022-07-20 ASSESSMENT — LIFESTYLE VARIABLES: HOW OFTEN DO YOU HAVE A DRINK CONTAINING ALCOHOL: NEVER

## 2022-07-20 NOTE — ED TRIAGE NOTES
Pt has not had follow up since surgery, had surgery at [facility]. Does do PT. Was told he needed his bandages changed and his brace realigned as well.

## 2022-07-20 NOTE — ED PROVIDER NOTES
[facility] Emergency Department Provider Note                   PCP:                Dillan Field MD               Age: 77 y.o. Sex: male       ICD-10-CM    1. Encounter for post surgical wound check  Z48.89           DISPOSITION Decision To Discharge 07/20/2022 11:40:56 AM        MDM  Number of Diagnoses or Management Options  Encounter for post surgical wound check  Diagnosis management comments: Surgical dressing was removed and replaced with gauze and Adaptic followed by a stockinette cover. The brace was adjusted. Amount and/or Complexity of Data Reviewed  Tests in the radiology section of CPT®: ordered and reviewed  Independent visualization of images, tracings, or specimens: yes    Risk of Complications, Morbidity, and/or Mortality  Presenting problems: low  Diagnostic procedures: low  Management options: low    Patient Progress  Patient progress: stable       Orders Placed This Encounter   Procedures    XR KNEE LEFT (3 VIEWS)        Zeina Mckeon is a 77 y.o. male who presents to the Emergency Department with chief complaint of    Chief Complaint   Patient presents with    Other      Patient was apparently directed to the emergency department by orthopedics office due to complaints of pain and no follow-up for an open fracture of the left tibial plateau that occurred 2 weeks ago. The patient has not had a dressing change since the surgery. The injury occurred in [facility] and the patient lives here in [place] and is not yet established follow-up care with orthopedic group although he has made contact with [place] [facility] follow-up appointment is still pending. He is receiving home physical therapy for the injury. He denies any fever or chills and pain is managed with extra strength Tylenol. Denies any paresthesias or skin color change of the lower extremity. He states that the physical therapist also recommended that he have this splint adjusted for fit.     The history is provided by the patient. Review of Systems   Constitutional:  Negative for chills and fever. All other systems reviewed and are negative. Past Medical History:   Diagnosis Date    Elevated PSA     History of stress test 1999    WNL    Hx of Wegener's granulomatosis     Hypercholesterolemia     Hypertension     Kidney disease     Stroke ([facility] [address] 01/28/2019    CVA        Past Surgical History:   Procedure Laterality Date    APPENDECTOMY      COLONOSCOPY  2009    THORACOTOMY  2007    Wegener's granulomatosis    TIBIA FRACTURE SURGERY Left         Family History   Problem Relation Age of Onset    Stroke Brother     Hypertension Brother     Cancer Father         smoker        Social History     Socioeconomic History    Marital status:    Tobacco Use    Smoking status: Former     Packs/day: 1.50     Types: Cigarettes    Smokeless tobacco: Never    Tobacco comments:     Quit smoking: Pt states he hasn't smoked since he went into the hospital 12/29/2021   Vaping Use    Vaping Use: Never used   Substance and Sexual Activity    Alcohol use: Not Currently     Alcohol/week: 3.0 standard drinks    Drug use: No     Social Determinants of Health     Physical Activity: Insufficiently Active    Days of Exercise per Week: 1 day    Minutes of Exercise per Session: 20 min         Penicillins     Previous Medications    AMLODIPINE (NORVASC) 5 MG TABLET    Take 5 mg by mouth daily    ASPIRIN 81 MG EC TABLET    Take 81 mg by mouth daily    ATORVASTATIN (LIPITOR) 80 MG TABLET    Take 1 tablet by mouth daily    CHOLECALCIFEROL (VITAMIN D3) 1.25 MG (84017 UT) CAPS    Take 1.25 capsules by mouth daily        Vitals signs and nursing note reviewed. Patient Vitals for the past 4 hrs:   Temp Pulse Resp BP SpO2   07/20/22 0915 98.8 °F (37.1 °C) (!) 108 16 120/81 97 %          Physical Exam  Vitals and nursing note reviewed. Constitutional:       General: He is not in acute distress. Appearance: Normal appearance.  He is not ill-appearing, toxic-appearing or diaphoretic. HENT:      Head: Normocephalic and atraumatic. Skin:     General: Skin is warm and dry. Capillary Refill: Capillary refill takes less than 2 seconds. Comments: Dressing was removed and the surgical wound was evaluated. No evidence of cellulitis is noted there is granulation tissue in the midportion of the surgical incision. No joint effusion is noted. No significant warmth no edema of the lower extremity. Neurological:      General: No focal deficit present. Mental Status: He is alert and oriented to person, place, and time. Mental status is at baseline. Psychiatric:         Mood and Affect: Mood normal.         Behavior: Behavior normal.         Thought Content: Thought content normal.        Procedures      Labs Reviewed - No data to display     XR KNEE LEFT (3 VIEWS)   Final Result   1. Healing proximal tibial fracture involving the tibial plateau and proximal   metaphysis, in near-anatomic alignment after internal fixation. 2. There is no evidence of hardware complication or failure. 3. Trace joint effusion. Voice dictation software was used during the making of this note. This software is not perfect and grammatical and other typographical errors may be present. This note has not been completely proofread for errors.      Jc Goode DO  07/20/22 9999

## 2022-07-20 NOTE — ED TRIAGE NOTES
Pt arrives ambulatory to triage. Had a tibial plateau fx 2 weeks ago to left knee. Referred to [facility]. POA could not see him and told him to come here because \"they cannot see him and may not even see him and he is having leg pain and need the brace checked and Aditi Mcwilliams told us at [facility] we are good with trauma. \" NAD. Masked.

## 2022-07-20 NOTE — ED NOTES
I have reviewed discharge instructions with the patient. The patient verbalized understanding. Patient left ED via Discharge Method: ambulatory to Home with self    Opportunity for questions and clarification provided. Patient given 0 scripts. To continue your aftercare when you leave the hospital, you may receive an automated call from our care team to check in on how you are doing. This is a free service and part of our promise to provide the best care and service to meet your aftercare needs.  If you have questions, or wish to unsubscribe from this service please call [phone]. Thank you for Choosing our [facility] Emergency Department.          Usama Rizzo RN  07/20/22 5788

## 2022-07-21 ENCOUNTER — TELEPHONE (OUTPATIENT)
Dept: ORTHOPEDIC SURGERY | Age: 77
End: 2022-07-21

## 2022-07-21 NOTE — TELEPHONE ENCOUNTER
Spoke with [facility] at [facility] concerning Np status. Called patient to discuss his status. And to schedule with Dr Carli Hartley. LVM with DT direct line for patient to call back,  If he or Mrs. Samantha Krishnamurthy calls back. Please transfer them to DT office.

## 2022-07-25 ENCOUNTER — COMMUNITY OUTREACH (OUTPATIENT)
Dept: INTERNAL MEDICINE CLINIC | Facility: CLINIC | Age: 77
End: 2022-07-25

## 2022-08-04 ENCOUNTER — OFFICE VISIT (OUTPATIENT)
Dept: ORTHOPEDIC SURGERY | Age: 77
End: 2022-08-04
Payer: MEDICARE

## 2022-08-04 DIAGNOSIS — S82.142A CLOSED BICONDYLAR FRACTURE OF LEFT TIBIAL PLATEAU: Primary | ICD-10-CM

## 2022-08-04 PROCEDURE — 99212 OFFICE O/P EST SF 10 MIN: CPT | Performed by: ORTHOPAEDIC SURGERY

## 2022-08-04 PROCEDURE — G8420 CALC BMI NORM PARAMETERS: HCPCS | Performed by: ORTHOPAEDIC SURGERY

## 2022-08-04 PROCEDURE — 1123F ACP DISCUSS/DSCN MKR DOCD: CPT | Performed by: ORTHOPAEDIC SURGERY

## 2022-08-04 PROCEDURE — G8427 DOCREV CUR MEDS BY ELIG CLIN: HCPCS | Performed by: ORTHOPAEDIC SURGERY

## 2022-08-04 PROCEDURE — 1036F TOBACCO NON-USER: CPT | Performed by: ORTHOPAEDIC SURGERY

## 2022-08-08 RX ORDER — AMLODIPINE BESYLATE 5 MG/1
TABLET ORAL
Qty: 90 TABLET | Refills: 3 | OUTPATIENT
Start: 2022-08-08

## 2022-08-11 ENCOUNTER — NURSE ONLY (OUTPATIENT)
Dept: INTERNAL MEDICINE CLINIC | Facility: CLINIC | Age: 77
End: 2022-08-11

## 2022-08-11 DIAGNOSIS — D69.6 THROMBOCYTOPENIA (HCC): ICD-10-CM

## 2022-08-11 LAB
ALBUMIN SERPL-MCNC: 3.9 G/DL (ref 3.2–4.6)
ALBUMIN/GLOB SERPL: 1.6 {RATIO} (ref 1.2–3.5)
ALP SERPL-CCNC: 205 U/L (ref 50–136)
ALT SERPL-CCNC: 29 U/L (ref 12–65)
ANION GAP SERPL CALC-SCNC: 2 MMOL/L (ref 7–16)
AST SERPL-CCNC: 14 U/L (ref 15–37)
BASOPHILS # BLD: 0.1 K/UL (ref 0–0.2)
BASOPHILS NFR BLD: 1 % (ref 0–2)
BILIRUB SERPL-MCNC: 0.7 MG/DL (ref 0.2–1.1)
BUN SERPL-MCNC: 16 MG/DL (ref 8–23)
CALCIUM SERPL-MCNC: 9.2 MG/DL (ref 8.3–10.4)
CHLORIDE SERPL-SCNC: 108 MMOL/L (ref 98–107)
CO2 SERPL-SCNC: 28 MMOL/L (ref 21–32)
CREAT SERPL-MCNC: 0.8 MG/DL (ref 0.8–1.5)
DIFFERENTIAL METHOD BLD: ABNORMAL
EOSINOPHIL # BLD: 0.2 K/UL (ref 0–0.8)
EOSINOPHIL NFR BLD: 3 % (ref 0.5–7.8)
ERYTHROCYTE [DISTWIDTH] IN BLOOD BY AUTOMATED COUNT: 14.5 % (ref 11.9–14.6)
GLOBULIN SER CALC-MCNC: 2.5 G/DL (ref 2.3–3.5)
GLUCOSE SERPL-MCNC: 80 MG/DL (ref 65–100)
HCT VFR BLD AUTO: 45.6 % (ref 41.1–50.3)
HGB BLD-MCNC: 14.3 G/DL (ref 13.6–17.2)
IMM GRANULOCYTES # BLD AUTO: 0 K/UL (ref 0–0.5)
IMM GRANULOCYTES NFR BLD AUTO: 0 % (ref 0–5)
LYMPHOCYTES # BLD: 2.5 K/UL (ref 0.5–4.6)
LYMPHOCYTES NFR BLD: 42 % (ref 13–44)
MCH RBC QN AUTO: 31.6 PG (ref 26.1–32.9)
MCHC RBC AUTO-ENTMCNC: 31.4 G/DL (ref 31.4–35)
MCV RBC AUTO: 100.7 FL (ref 79.6–97.8)
MONOCYTES # BLD: 0.5 K/UL (ref 0.1–1.3)
MONOCYTES NFR BLD: 8 % (ref 4–12)
NEUTS SEG # BLD: 2.8 K/UL (ref 1.7–8.2)
NEUTS SEG NFR BLD: 46 % (ref 43–78)
NRBC # BLD: 0 K/UL (ref 0–0.2)
PLATELET # BLD AUTO: 188 K/UL (ref 150–450)
PMV BLD AUTO: 11.7 FL (ref 9.4–12.3)
POTASSIUM SERPL-SCNC: 4.2 MMOL/L (ref 3.5–5.1)
PROT SERPL-MCNC: 6.4 G/DL (ref 6.3–8.2)
RBC # BLD AUTO: 4.53 M/UL (ref 4.23–5.6)
SODIUM SERPL-SCNC: 138 MMOL/L (ref 138–145)
WBC # BLD AUTO: 6 K/UL (ref 4.3–11.1)

## 2022-08-17 ENCOUNTER — OFFICE VISIT (OUTPATIENT)
Dept: INTERNAL MEDICINE CLINIC | Facility: CLINIC | Age: 77
End: 2022-08-17
Payer: MEDICARE

## 2022-08-17 VITALS
HEIGHT: 66 IN | RESPIRATION RATE: 16 BRPM | TEMPERATURE: 98.1 F | HEART RATE: 85 BPM | BODY MASS INDEX: 23.73 KG/M2 | OXYGEN SATURATION: 99 % | SYSTOLIC BLOOD PRESSURE: 104 MMHG | DIASTOLIC BLOOD PRESSURE: 67 MMHG

## 2022-08-17 DIAGNOSIS — R97.20 ELEVATED PSA: ICD-10-CM

## 2022-08-17 DIAGNOSIS — I63.89 CEREBROVASCULAR ACCIDENT (CVA) DUE TO OTHER MECHANISM (HCC): ICD-10-CM

## 2022-08-17 DIAGNOSIS — E78.2 MIXED HYPERLIPIDEMIA: ICD-10-CM

## 2022-08-17 DIAGNOSIS — B44.1 PULMONARY ASPERGILLOSIS (HCC): ICD-10-CM

## 2022-08-17 DIAGNOSIS — S82.142A CLOSED BICONDYLAR FRACTURE OF LEFT TIBIAL PLATEAU: ICD-10-CM

## 2022-08-17 DIAGNOSIS — E55.9 VITAMIN D INSUFFICIENCY: ICD-10-CM

## 2022-08-17 DIAGNOSIS — M85.89 OSTEOPENIA OF MULTIPLE SITES: ICD-10-CM

## 2022-08-17 DIAGNOSIS — Z23 NEED FOR SHINGLES VACCINE: ICD-10-CM

## 2022-08-17 DIAGNOSIS — I10 ESSENTIAL HYPERTENSION: Primary | ICD-10-CM

## 2022-08-17 DIAGNOSIS — D69.6 THROMBOCYTOPENIA (HCC): ICD-10-CM

## 2022-08-17 DIAGNOSIS — M31.31 GRANULOMATOSIS WITH POLYANGIITIS WITH RENAL INVOLVEMENT (HCC): ICD-10-CM

## 2022-08-17 DIAGNOSIS — F17.200 SMOKER: ICD-10-CM

## 2022-08-17 DIAGNOSIS — G81.94 LEFT HEMIPARESIS (HCC): ICD-10-CM

## 2022-08-17 PROBLEM — J96.01 ACUTE RESPIRATORY FAILURE WITH HYPOXIA (HCC): Status: RESOLVED | Noted: 2022-01-03 | Resolved: 2022-08-17

## 2022-08-17 PROCEDURE — G8420 CALC BMI NORM PARAMETERS: HCPCS | Performed by: INTERNAL MEDICINE

## 2022-08-17 PROCEDURE — 1123F ACP DISCUSS/DSCN MKR DOCD: CPT | Performed by: INTERNAL MEDICINE

## 2022-08-17 PROCEDURE — 99214 OFFICE O/P EST MOD 30 MIN: CPT | Performed by: INTERNAL MEDICINE

## 2022-08-17 PROCEDURE — 1036F TOBACCO NON-USER: CPT | Performed by: INTERNAL MEDICINE

## 2022-08-17 PROCEDURE — G8427 DOCREV CUR MEDS BY ELIG CLIN: HCPCS | Performed by: INTERNAL MEDICINE

## 2022-08-17 RX ORDER — ZOSTER VACCINE RECOMBINANT, ADJUVANTED 50 MCG/0.5
0.5 KIT INTRAMUSCULAR SEE ADMIN INSTRUCTIONS
Qty: 0.5 ML | Refills: 0 | Status: SHIPPED | OUTPATIENT
Start: 2022-08-17 | End: 2023-02-13

## 2022-08-17 ASSESSMENT — ENCOUNTER SYMPTOMS
ABDOMINAL PAIN: 0
RHINORRHEA: 0
BLOOD IN STOOL: 0
COUGH: 0
SHORTNESS OF BREATH: 0

## 2022-08-17 ASSESSMENT — PATIENT HEALTH QUESTIONNAIRE - PHQ9
1. LITTLE INTEREST OR PLEASURE IN DOING THINGS: 0
SUM OF ALL RESPONSES TO PHQ QUESTIONS 1-9: 0
2. FEELING DOWN, DEPRESSED OR HOPELESS: 0
SUM OF ALL RESPONSES TO PHQ9 QUESTIONS 1 & 2: 0

## 2022-08-17 NOTE — PROGRESS NOTES
[facility]      2022    Patient Name: Kassandra Levin  :  1945    Subjective:    Chief Complaint:  Chief Complaint   Patient presents with    Follow-up     Pt is here to review labs. HPI Here for f/u visit; patient had fasting labs done recently and results were reviewed in detail today; He has hx of open proximal tibia fracture, saw Dr. Alfred Freed for f/u 22; he can begin weightbearing for transfers, no ambulation for now; he should wear brace in PT and when weightbearing; he is to f/u in 2 weeks; He has Wegener's granulomatosis, pulmonary aspergillosis, RUL cavitary lung disease, COPD; he saw Dr. Sarita Harrington for f/u in ; he's had improvement with antifungals, is to have repeat CT and f/u in 6 months records reviewed; He saw Dr. Milan العراقي, Nephrology, in March; good renal fuction, UA negative for proteinuria, is to f/u in 6 months for recheck; records reviewed; He saw Dr. Cedrick Alonso, Rheumatology, in February; Rituximab considered at that time; records reviewed; He has not smoked in 2 weeks and is working on quitting;   HTN:  Patient compliant with taking blood pressure medications: Yes  Discussed importance of following low sodium DASH diet, increasing physical activity, taking medications as ordered, decreasing alcohol intake, keeping f/u visits to recheck blood pressure, monitoring blood pressure at home and keeping a log, with goal blood pressure <140/90.   Home bp readings are: good        Past Medical History:   Diagnosis Date    Elevated PSA     History of stress test     WNL    Hx of Wegener's granulomatosis     Hypercholesterolemia     Hypertension     Kidney disease     Stroke ([address] 2019    CVA       Past Surgical History:   Procedure Laterality Date    APPENDECTOMY      COLONOSCOPY  2009    THORACOTOMY  2007    Wegener's granulomatosis    TIBIA FRACTURE SURGERY Left        Family History   Problem Relation Age of Onset    Stroke Brother     Hypertension Brother     Cancer Father         smoker       Social History     Tobacco Use    Smoking status: Former     Packs/day: 1.50     Types: Cigarettes    Smokeless tobacco: Never    Tobacco comments:     Quit smoking: Pt states he hasn't smoked since he went into the hospital 12/29/2021   Vaping Use    Vaping Use: Never used   Substance Use Topics    Alcohol use: Not Currently     Alcohol/week: 3.0 standard drinks    Drug use: No                 Current Outpatient Medications:     zoster recombinant adjuvanted vaccine (SHINGRIX) 50 MCG/0.5ML SUSR injection, Inject 0.5 mLs into the muscle See Admin Instructions 1 dose now and repeat in 2-6 months, Disp: 0.5 mL, Rfl: 0    Cholecalciferol (VITAMIN D3) 1.25 MG (71446 UT) CAPS, Take 1.25 capsules by mouth daily, Disp: , Rfl:     atorvastatin (LIPITOR) 80 MG tablet, Take 1 tablet by mouth daily, Disp: 90 tablet, Rfl: 3    amLODIPine (NORVASC) 5 MG tablet, Take 5 mg by mouth daily, Disp: , Rfl:     aspirin 81 MG EC tablet, Take 81 mg by mouth daily, Disp: , Rfl:     Allergies   Allergen Reactions    Penicillins Anaphylaxis, Other (See Comments) and Rash       Review of Systems   Constitutional:  Negative for chills, fatigue and fever. HENT:  Negative for congestion, postnasal drip and rhinorrhea. Eyes:  Negative for visual disturbance. Respiratory:  Negative for cough and shortness of breath. Cardiovascular:  Negative for chest pain and palpitations. Gastrointestinal:  Negative for abdominal pain and blood in stool. Genitourinary:  Negative for dysuria and frequency. Musculoskeletal:  Positive for arthralgias and myalgias. Skin: Negative. Neurological:  Negative for numbness and headaches. Psychiatric/Behavioral:  Negative for dysphoric mood and sleep disturbance. The patient is not nervous/anxious. All other systems reviewed and are negative.     Objective:  /67   Pulse 85   Temp 98.1 °F (36.7 °C) (Temporal)   Resp 16   Ht 5' 6\" (1.676 m) SpO2 99%   BMI 23.73 kg/m²     Examination:  Physical Exam  Vitals reviewed. Constitutional:       Appearance: Normal appearance. HENT:      Head: Normocephalic and atraumatic. Nose: Nose normal.      Mouth/Throat:      Mouth: Mucous membranes are moist.      Pharynx: Oropharynx is clear. Eyes:      Extraocular Movements: Extraocular movements intact. Conjunctiva/sclera: Conjunctivae normal.      Pupils: Pupils are equal, round, and reactive to light. Cardiovascular:      Rate and Rhythm: Normal rate and regular rhythm. Pulses: Normal pulses. Heart sounds: Normal heart sounds. Pulmonary:      Effort: Pulmonary effort is normal.      Breath sounds: Normal breath sounds. Abdominal:      General: Abdomen is flat. Bowel sounds are normal.      Palpations: Abdomen is soft. Musculoskeletal:         General: Normal range of motion. Cervical back: Normal range of motion and neck supple. Skin:     General: Skin is warm and dry. Neurological:      General: No focal deficit present. Mental Status: He is alert and oriented to person, place, and time. Mental status is at baseline. Psychiatric:         Mood and Affect: Mood normal.         Behavior: Behavior normal.         Assessment/Plan:    Shaylee Gill was seen today for follow-up. Diagnoses and all orders for this visit:    Essential hypertension  Stable, continue medication, diet. 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 Cerebrovascular accident (CVA) due to other mechanism ([address]  Stable. 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 Left hemiparesis (HCC)  Stable. Osteopenia of multiple sites  Stable. Thrombocytopenia (HCC)  Stable.     Smoker  He has not smoked in over 2 weeks and is working on quitting;     Mixed hyperlipidemia  Will check fasting lipids at next visit;     Elevated PSA  -     PSA, Total and Free; Future    Closed bicondylar fracture of left tibial plateau  He is wearing his brace today, has upcoming appointment with Ortho;     Need for shingles vaccine  -     zoster recombinant adjuvanted vaccine [facility]) 50 MCG/0.5ML SUSR injection; Inject 0.5 mLs into the muscle See Admin Instructions 1 dose now and repeat in 2-6 months    Vitamin D insufficiency  -     Vitamin D 25 Hydroxy; Future        Follow-up and Dispositions    Return in about 6 months (around 2/17/2023), or if symptoms worsen or fail to improve, for f/u w/ labs. Medication Reconciliation:  Current Medications Verified: Current medications/ immunizations were reviewed, including purpose, with patient. Family History, Social History, Current and Past Medical History was reviewed with patient and updated at today's office visit. Medication Reconciliation list was given to patient/ family. Patient was advised to discard old medication lists and provide all providers with current list at each visit and carry list with them in case of emergency.     Completed By:   Jose Gil MD    [facility], [address], [address]  [phone] fax  4:44 PM

## 2022-08-18 ENCOUNTER — OFFICE VISIT (OUTPATIENT)
Dept: ORTHOPEDIC SURGERY | Age: 77
End: 2022-08-18

## 2022-08-18 DIAGNOSIS — S82.199A METAPHYSEAL FRACTURE OF PROXIMAL END OF TIBIA: Primary | ICD-10-CM

## 2022-08-18 PROCEDURE — 99024 POSTOP FOLLOW-UP VISIT: CPT | Performed by: ORTHOPAEDIC SURGERY

## 2022-08-18 NOTE — PROGRESS NOTES
Progress Note    Patient: Chuy Pichardo MRN: [id number]  SSN: [id number]    YOB: 1945  Age: 69 y.o. Sex: male        8/18/2022      Subjective:     Patient is now about 6 weeks out from open reduction internal fixation of the left proximal tibia fracture and irrigation debridement. He says he has stopped smoking so I really congratulated him on that today. Objective: There were no vitals filed for this visit. Physical Exam:     Skin - incision is well healed with no redness or drainage  Motor and sensory function intact in LEFT LOWER extremity  Pulses palpable in LEFT LOWER extremity     XRAY FINDINGS:  [id number] left proximal tibia fracture, findings-AP and lateral views the left knee shows a left proximal tibia fracture is very well aligned on both the AP and lateral projection. The hardware is intact with no evidence of loosening or failure. There does appear to be a little bit of evidence of callus formation on the posterior cortex of the proximal tibia on the lateral projection. Impression #well aligned left proximal tibia fracture    Assessment:     Well aligned left proximal tibia fracture now 6 weeks out    Plan:     I think now for his therapy we can go ahead and let him do aggressive full active and aggressive passive range of motion of the left knee. He can also be full strengthening of the left knee with no restrictions. He can now be weightbearing as tolerated and he can ambulate. The 1 thing I would like for him to do is to wear his brace for about the next 2 weeks while he is up and ambulating he does not need to wear the brace otherwise. And then after that hopefully he can basically be no restrictions after another 2 weeks.   I will see him back in 6 weeks with AP and lateral left knee on return he will be about 3 months out at that time    Signed By: Javid Serrano MD     August 18, 2022

## 2022-09-29 ENCOUNTER — OFFICE VISIT (OUTPATIENT)
Dept: ORTHOPEDIC SURGERY | Age: 77
End: 2022-09-29

## 2022-09-29 DIAGNOSIS — S82.142A CLOSED BICONDYLAR FRACTURE OF LEFT TIBIAL PLATEAU: Primary | ICD-10-CM

## 2022-09-29 PROCEDURE — 99024 POSTOP FOLLOW-UP VISIT: CPT | Performed by: ORTHOPAEDIC SURGERY

## 2022-11-10 ENCOUNTER — OFFICE VISIT (OUTPATIENT)
Dept: ORTHOPEDIC SURGERY | Age: 77
End: 2022-11-10
Payer: MEDICARE

## 2022-11-10 DIAGNOSIS — S82.142A CLOSED BICONDYLAR FRACTURE OF LEFT TIBIAL PLATEAU: Primary | ICD-10-CM

## 2022-11-10 PROCEDURE — G8428 CUR MEDS NOT DOCUMENT: HCPCS | Performed by: ORTHOPAEDIC SURGERY

## 2022-11-10 PROCEDURE — G8420 CALC BMI NORM PARAMETERS: HCPCS | Performed by: ORTHOPAEDIC SURGERY

## 2022-11-10 PROCEDURE — 99212 OFFICE O/P EST SF 10 MIN: CPT | Performed by: ORTHOPAEDIC SURGERY

## 2022-11-10 PROCEDURE — G8484 FLU IMMUNIZE NO ADMIN: HCPCS | Performed by: ORTHOPAEDIC SURGERY

## 2022-11-10 PROCEDURE — 1036F TOBACCO NON-USER: CPT | Performed by: ORTHOPAEDIC SURGERY

## 2022-11-10 PROCEDURE — 1123F ACP DISCUSS/DSCN MKR DOCD: CPT | Performed by: ORTHOPAEDIC SURGERY

## 2022-11-10 NOTE — PROGRESS NOTES
Progress Note    Patient: Hattie Mota MRN: [id number]  SSN: [id number]    YOB: 1945  Age: 69 y.o. Sex: male        11/10/2022      Subjective:     Patient is now about 4-1/2 months out from open reduction internal fixation of a left proximal tibia fracture. This was done at outside institution. He definitely is feeling better he is having much less pain and he is pretty satisfied with how he is doing    Objective: There were no vitals filed for this visit. Physical Exam:     Skin - incision is well healed with no redness or drainage  Motor and sensory function intact in LEFT LOWER extremity  Pulses palpable in LEFT LOWER extremity     XRAY FINDINGS:  [id number]-ex left proximal tibia fracture, findings-true AP and lateral views of the left knee shows the left proximal tibia fracture shows significant evidence of increased callus formation especially posteriorly on the lateral view and medially and laterally on the AP view. There is still a persistent fracture line through the area of his anterior cortex but even this appears to show some subtle evidence of healing. His hardware is intact with no evidence of loosening or failure and overall alignment is satisfactory with only very mild amount of varus and this has been stable the whole time. Impression is healing well aligned left proximal tibia fracture    Assessment:     Left proximal tibia fracture    Plan:     I think he can essentially be activity as tolerated. I do think would be lorenz for him to have 1 more appointment about 4 months with AP and lateral left knee on return just to make sure this is solidly healed.   So we will see him back for 1 final visit hopefully in about 4 months he will be about 8 months out at that time    Signed By: Stone Pablo MD     November 10, 2022

## 2022-11-21 DIAGNOSIS — E55.9 VITAMIN D INSUFFICIENCY: ICD-10-CM

## 2022-11-21 DIAGNOSIS — I10 ESSENTIAL HYPERTENSION: ICD-10-CM

## 2022-11-21 DIAGNOSIS — R97.20 ELEVATED PSA: ICD-10-CM

## 2022-11-21 LAB
25(OH)D3 SERPL-MCNC: 55.8 NG/ML (ref 30–100)
ALBUMIN SERPL-MCNC: 4.3 G/DL (ref 3.2–4.6)
ALBUMIN/GLOB SERPL: 1.4 {RATIO} (ref 0.4–1.6)
ALP SERPL-CCNC: 194 U/L (ref 50–136)
ALT SERPL-CCNC: 28 U/L (ref 12–65)
ANION GAP SERPL CALC-SCNC: 1 MMOL/L (ref 2–11)
APPEARANCE UR: CLEAR
AST SERPL-CCNC: 19 U/L (ref 15–37)
BASOPHILS # BLD: 0.1 K/UL (ref 0–0.2)
BASOPHILS NFR BLD: 1 % (ref 0–2)
BILIRUB SERPL-MCNC: 1 MG/DL (ref 0.2–1.1)
BILIRUB UR QL: NEGATIVE
BUN SERPL-MCNC: 14 MG/DL (ref 8–23)
CALCIUM SERPL-MCNC: 10 MG/DL (ref 8.3–10.4)
CHLORIDE SERPL-SCNC: 112 MMOL/L (ref 101–110)
CHOLEST SERPL-MCNC: 119 MG/DL
CO2 SERPL-SCNC: 29 MMOL/L (ref 21–32)
COLOR UR: ABNORMAL
CREAT SERPL-MCNC: 1.1 MG/DL (ref 0.8–1.5)
DIFFERENTIAL METHOD BLD: ABNORMAL
EOSINOPHIL # BLD: 0.2 K/UL (ref 0–0.8)
EOSINOPHIL NFR BLD: 3 % (ref 0.5–7.8)
ERYTHROCYTE [DISTWIDTH] IN BLOOD BY AUTOMATED COUNT: 14.2 % (ref 11.9–14.6)
GLOBULIN SER CALC-MCNC: 3.1 G/DL (ref 2.8–4.5)
GLUCOSE SERPL-MCNC: 97 MG/DL (ref 65–100)
GLUCOSE UR STRIP.AUTO-MCNC: NEGATIVE MG/DL
HCT VFR BLD AUTO: 51.8 % (ref 41.1–50.3)
HDLC SERPL-MCNC: 56 MG/DL (ref 40–60)
HDLC SERPL: 2.1 {RATIO}
HGB BLD-MCNC: 17.2 G/DL (ref 13.6–17.2)
HGB UR QL STRIP: NEGATIVE
IMM GRANULOCYTES # BLD AUTO: 0 K/UL (ref 0–0.5)
IMM GRANULOCYTES NFR BLD AUTO: 0 % (ref 0–5)
KETONES UR QL STRIP.AUTO: ABNORMAL MG/DL
LDLC SERPL CALC-MCNC: 52.8 MG/DL
LEUKOCYTE ESTERASE UR QL STRIP.AUTO: NEGATIVE
LYMPHOCYTES # BLD: 2.3 K/UL (ref 0.5–4.6)
LYMPHOCYTES NFR BLD: 32 % (ref 13–44)
MCH RBC QN AUTO: 32 PG (ref 26.1–32.9)
MCHC RBC AUTO-ENTMCNC: 33.2 G/DL (ref 31.4–35)
MCV RBC AUTO: 96.5 FL (ref 82–102)
MONOCYTES # BLD: 0.6 K/UL (ref 0.1–1.3)
MONOCYTES NFR BLD: 8 % (ref 4–12)
NEUTS SEG # BLD: 4.1 K/UL (ref 1.7–8.2)
NEUTS SEG NFR BLD: 56 % (ref 43–78)
NITRITE UR QL STRIP.AUTO: NEGATIVE
NRBC # BLD: 0 K/UL (ref 0–0.2)
PH UR STRIP: 6.5 [PH] (ref 5–9)
PLATELET # BLD AUTO: 184 K/UL (ref 150–450)
PMV BLD AUTO: 11.7 FL (ref 9.4–12.3)
POTASSIUM SERPL-SCNC: 5.1 MMOL/L (ref 3.5–5.1)
PROT SERPL-MCNC: 7.4 G/DL (ref 6.3–8.2)
PROT UR STRIP-MCNC: NEGATIVE MG/DL
RBC # BLD AUTO: 5.37 M/UL (ref 4.23–5.6)
SODIUM SERPL-SCNC: 142 MMOL/L (ref 133–143)
SP GR UR REFRACTOMETRY: 1.02 (ref 1–1.02)
T4 FREE SERPL-MCNC: 1.2 NG/DL (ref 0.78–1.46)
TRIGL SERPL-MCNC: 51 MG/DL (ref 35–150)
TSH, 3RD GENERATION: 2.32 UIU/ML (ref 0.36–3.74)
UROBILINOGEN UR QL STRIP.AUTO: 1 EU/DL (ref 0.2–1)
VLDLC SERPL CALC-MCNC: 10.2 MG/DL (ref 6–23)
WBC # BLD AUTO: 7.3 K/UL (ref 4.3–11.1)

## 2022-11-22 LAB
PSA FREE MFR SERPL: 22.5 %
PSA FREE SERPL-MCNC: 0.9 NG/ML
PSA SERPL-MCNC: 4 NG/ML

## 2022-11-28 ENCOUNTER — OFFICE VISIT (OUTPATIENT)
Dept: INTERNAL MEDICINE CLINIC | Facility: CLINIC | Age: 77
End: 2022-11-28
Payer: MEDICARE

## 2022-11-28 VITALS
RESPIRATION RATE: 16 BRPM | HEIGHT: 66 IN | WEIGHT: 146 LBS | DIASTOLIC BLOOD PRESSURE: 75 MMHG | BODY MASS INDEX: 23.46 KG/M2 | SYSTOLIC BLOOD PRESSURE: 128 MMHG | HEART RATE: 87 BPM | TEMPERATURE: 98.3 F | OXYGEN SATURATION: 99 %

## 2022-11-28 DIAGNOSIS — B44.1 PULMONARY ASPERGILLOSIS (HCC): ICD-10-CM

## 2022-11-28 DIAGNOSIS — E78.2 MIXED HYPERLIPIDEMIA: ICD-10-CM

## 2022-11-28 DIAGNOSIS — M31.31 GRANULOMATOSIS WITH POLYANGIITIS WITH RENAL INVOLVEMENT (HCC): ICD-10-CM

## 2022-11-28 DIAGNOSIS — D69.6 THROMBOCYTOPENIA (HCC): ICD-10-CM

## 2022-11-28 DIAGNOSIS — G81.94 LEFT HEMIPARESIS (HCC): ICD-10-CM

## 2022-11-28 DIAGNOSIS — S82.142A CLOSED BICONDYLAR FRACTURE OF LEFT TIBIAL PLATEAU: ICD-10-CM

## 2022-11-28 DIAGNOSIS — R97.20 ELEVATED PSA: ICD-10-CM

## 2022-11-28 DIAGNOSIS — I10 ESSENTIAL HYPERTENSION: Primary | ICD-10-CM

## 2022-11-28 DIAGNOSIS — Z86.73 HISTORY OF CVA (CEREBROVASCULAR ACCIDENT): ICD-10-CM

## 2022-11-28 DIAGNOSIS — Z91.81 AT HIGH RISK FOR FALLS: ICD-10-CM

## 2022-11-28 DIAGNOSIS — F17.200 SMOKER: ICD-10-CM

## 2022-11-28 DIAGNOSIS — M81.0 AGE-RELATED OSTEOPOROSIS WITHOUT CURRENT PATHOLOGICAL FRACTURE: ICD-10-CM

## 2022-11-28 PROBLEM — F10.10 ALCOHOL ABUSE: Status: RESOLVED | Noted: 2019-01-28 | Resolved: 2022-11-28

## 2022-11-28 PROCEDURE — G8484 FLU IMMUNIZE NO ADMIN: HCPCS | Performed by: INTERNAL MEDICINE

## 2022-11-28 PROCEDURE — 1123F ACP DISCUSS/DSCN MKR DOCD: CPT | Performed by: INTERNAL MEDICINE

## 2022-11-28 PROCEDURE — 4004F PT TOBACCO SCREEN RCVD TLK: CPT | Performed by: INTERNAL MEDICINE

## 2022-11-28 PROCEDURE — 3074F SYST BP LT 130 MM HG: CPT | Performed by: INTERNAL MEDICINE

## 2022-11-28 PROCEDURE — 99214 OFFICE O/P EST MOD 30 MIN: CPT | Performed by: INTERNAL MEDICINE

## 2022-11-28 PROCEDURE — G8427 DOCREV CUR MEDS BY ELIG CLIN: HCPCS | Performed by: INTERNAL MEDICINE

## 2022-11-28 PROCEDURE — 3078F DIAST BP <80 MM HG: CPT | Performed by: INTERNAL MEDICINE

## 2022-11-28 PROCEDURE — G8420 CALC BMI NORM PARAMETERS: HCPCS | Performed by: INTERNAL MEDICINE

## 2022-11-28 RX ORDER — TAMSULOSIN HYDROCHLORIDE 0.4 MG/1
1 CAPSULE ORAL DAILY
COMMUNITY
Start: 2022-08-21

## 2022-11-28 RX ORDER — ALENDRONATE SODIUM 70 MG/1
TABLET ORAL
COMMUNITY
Start: 2022-09-20

## 2022-11-28 ASSESSMENT — PATIENT HEALTH QUESTIONNAIRE - PHQ9
SUM OF ALL RESPONSES TO PHQ QUESTIONS 1-9: 0
SUM OF ALL RESPONSES TO PHQ9 QUESTIONS 1 & 2: 0
2. FEELING DOWN, DEPRESSED OR HOPELESS: 0
1. LITTLE INTEREST OR PLEASURE IN DOING THINGS: 0

## 2022-11-28 ASSESSMENT — ENCOUNTER SYMPTOMS
RHINORRHEA: 0
SHORTNESS OF BREATH: 0
COUGH: 0
ABDOMINAL PAIN: 0
BLOOD IN STOOL: 0

## 2022-11-28 NOTE — PROGRESS NOTES
[facility]      2022    Patient Name: Jordi Simmons  :  1945    Subjective:    Chief Complaint:  Chief Complaint   Patient presents with    Hypertension     Pt is here to review labs. HPI Here for f/u visit; patient had fasting labs done recently and results were reviewed in detail today; He has osteoporosis, saw Dr. Mehrdad Starkey in September, was prescribed weekly Fosamax at that time; he has Wegener's disease with renal involvement; currently no evidence of renal relapse; he is to remain off maintenance therapy; he has pulmonary lesions and upcoming CT to monitor; he is to f/u in 3 months with ANCA titers; records reviewed; he also saw Dr. Andree Whitehead, Nephrology, in September; he was advised to avoid NSAIDs, reduce red meat, etc, and f/u in 6 months; records reviewed; He has hx of closed bicondylar fracture of L tibial plateau, saw Dr. Benito Lima from f/u 11/10/22; he is s/p ORIF of L proximal tibia fracture 4.5 months ago; he is to f/u in 4 months for recheck, can be active as tolerated; records reviewed; He follows a healthy diet, avoids fried foods; He has cut back on smoking, down to half pack per day and is working on quitting;   HTN:  Patient compliant with taking blood pressure medications: Yes  Discussed importance of following low sodium DASH diet, increasing physical activity, taking medications as ordered, decreasing alcohol intake, keeping f/u visits to recheck blood pressure, monitoring blood pressure at home and keeping a log, with goal blood pressure <140/90.   Home bp readings are: good      Past Medical History:   Diagnosis Date    Elevated PSA     History of stress test     WNL    Hx of Wegener's granulomatosis     Hypercholesterolemia     Hypertension     Kidney disease     Stroke ([facility] [address] 2019    CVA       Past Surgical History:   Procedure Laterality Date    APPENDECTOMY      COLONOSCOPY  2009    THORACOTOMY  2007    Wegener's granulomatosis    TIBIA FRACTURE SURGERY Left        Family History   Problem Relation Age of Onset    Stroke Brother     Hypertension Brother     Cancer Father         smoker       Social History     Tobacco Use    Smoking status: Some Days     Packs/day: 1.50     Types: Cigarettes    Smokeless tobacco: Never    Tobacco comments:     Social situations   Vaping Use    Vaping Use: Never used   Substance Use Topics    Alcohol use: Not Currently     Alcohol/week: 3.0 standard drinks    Drug use: No                 Current Outpatient Medications:     alendronate (FOSAMAX) 70 MG tablet, Take 1 tablet (70 mg) by mouth once a week Take with 8 ounces of water. Do not lay down for at least 30 minutes after taking. Separate from other medications and food/drink by 30 minutes. , Disp: , Rfl:     tamsulosin (FLOMAX) 0.4 MG capsule, Take 1 capsule by mouth daily, Disp: , Rfl:     zoster recombinant adjuvanted vaccine (SHINGRIX) 50 MCG/0.5ML SUSR injection, Inject 0.5 mLs into the muscle See Admin Instructions 1 dose now and repeat in 2-6 months, Disp: 0.5 mL, Rfl: 0    Cholecalciferol (VITAMIN D3) 1.25 MG (96616 UT) CAPS, Take 1.25 capsules by mouth daily, Disp: , Rfl:     atorvastatin (LIPITOR) 80 MG tablet, Take 1 tablet by mouth daily, Disp: 90 tablet, Rfl: 3    amLODIPine (NORVASC) 5 MG tablet, Take 5 mg by mouth daily, Disp: , Rfl:     aspirin 81 MG EC tablet, Take 81 mg by mouth daily, Disp: , Rfl:     Allergies   Allergen Reactions    Penicillins Anaphylaxis, Other (See Comments) and Rash       Review of Systems   Constitutional:  Negative for chills, fatigue and fever. HENT:  Negative for congestion, postnasal drip and rhinorrhea. Eyes:  Negative for visual disturbance. Respiratory:  Negative for cough and shortness of breath. Cardiovascular:  Negative for chest pain and palpitations. Gastrointestinal:  Negative for abdominal pain and blood in stool. Genitourinary:  Negative for dysuria and frequency.    Musculoskeletal:  Negative for arthralgias and myalgias. Skin: Negative. Neurological:  Negative for numbness and headaches. Psychiatric/Behavioral:  Negative for dysphoric mood and sleep disturbance. The patient is not nervous/anxious. All other systems reviewed and are negative. Objective:  /75   Pulse 87   Temp 98.3 °F (36.8 °C) (Temporal)   Resp 16   Ht 5' 6\" (1.676 m)   Wt 146 lb (66.2 kg)   SpO2 99%   BMI 23.57 kg/m²     Examination:  Physical Exam  Vitals reviewed. Constitutional:       Appearance: Normal appearance. HENT:      Head: Normocephalic and atraumatic. Nose: Nose normal.      Mouth/Throat:      Mouth: Mucous membranes are moist.      Pharynx: Oropharynx is clear. Eyes:      Extraocular Movements: Extraocular movements intact. Conjunctiva/sclera: Conjunctivae normal.      Pupils: Pupils are equal, round, and reactive to light. Cardiovascular:      Rate and Rhythm: Normal rate and regular rhythm. Pulses: Normal pulses. Heart sounds: Normal heart sounds. Pulmonary:      Effort: Pulmonary effort is normal.      Breath sounds: Normal breath sounds. Abdominal:      General: Abdomen is flat. Bowel sounds are normal.      Palpations: Abdomen is soft. Musculoskeletal:         General: Normal range of motion. Cervical back: Normal range of motion and neck supple. Skin:     General: Skin is warm and dry. Neurological:      General: No focal deficit present. Mental Status: He is alert and oriented to person, place, and time. Mental status is at baseline. Psychiatric:         Mood and Affect: Mood normal.         Behavior: Behavior normal.         Assessment/Plan:    Abisai Salazar was seen today for hypertension. Diagnoses and all orders for this visit:    Essential hypertension  Stable, continue medication, diet. 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 History of CVA (cerebrovascular accident)  Stable, continue medication, diet. Pulmonary aspergillosis (HCC)  Stable. Left hemiparesis ([address]  Stable, continue medication, diet. Age-related osteoporosis without current pathological fracture  Stable on present medications, continue as prescribed. He is taking daily calcium and vitamin D supplement, tolerating Fosamax well;     Closed bicondylar fracture of left tibial plateau  Stable. Thrombocytopenia ([address]  Resolved. Smoker  Discussed smoking cessation in detail; he has cut back to half pack per day and is working on quitting;     Mixed hyperlipidemia  Stable, continue medication, diet. Elevated PSA  Stable. Follow-up and Dispositions    Return in about 6 months (around 5/28/2023), or if symptoms worsen or fail to improve, for medicare annual w/ labs, keep appt in May/June. Medication Reconciliation:  Current Medications Verified: Current medications/ immunizations were reviewed, including purpose, with patient. Family History, Social History, Current and Past Medical History was reviewed with patient and updated at today's office visit. Medication Reconciliation list was given to patient/ family. Patient was advised to discard old medication lists and provide all providers with current list at each visit and carry list with them in case of emergency.     Completed By:   Olena Jane MD    [facility]  [address] [place] [address], [address], [address]  [phone]  [phone] fax  10:34 AM

## 2022-12-02 DIAGNOSIS — I10 ESSENTIAL HYPERTENSION: Primary | ICD-10-CM

## 2022-12-02 RX ORDER — AMLODIPINE BESYLATE 5 MG/1
5 TABLET ORAL DAILY
Qty: 90 TABLET | Refills: 3 | Status: SHIPPED | OUTPATIENT
Start: 2022-12-02

## 2023-03-14 ENCOUNTER — OFFICE VISIT (OUTPATIENT)
Dept: ORTHOPEDIC SURGERY | Age: 78
End: 2023-03-14

## 2023-03-14 DIAGNOSIS — S82.142A CLOSED BICONDYLAR FRACTURE OF LEFT TIBIAL PLATEAU: Primary | ICD-10-CM

## 2023-03-14 NOTE — PROGRESS NOTES
Progress Note    Patient: Arcelia Dakins MRN: [id number]  SSN: [id number]    YOB: 1945  Age: 69 y.o. Sex: male        3/14/2023      Subjective:     Patient is now about 8 months out from plate and screw fixation of a left proximal tibia fracture. He seems like he is doing pretty well. He says he has some occasional aching in his knee but in general it feels that he is doing pretty well he has no instability    Objective: There were no vitals filed for this visit. Physical Exam:     Skin - incision is well healed with no redness or drainage  Motor and sensory function intact in LEFT LOWER extremity  Pulses palpable in LEFT LOWER extremity     XRAY FINDINGS:  Dhfuivzzgom-icnwxj-hf left proximal tibial metaphyseal fracture, findings-AP and lateral views of the left knee shows that the left proximal tibial metaphyseal fracture appears to show significant evidence of healing compared to his previous x-rays. This is most notable on the lateral and he even has healed quite a bit through the area of his tubercle which was still a pretty distinct fracture line at his last x-ray in November 2022 and now this is healing quite a bit. The hardware is intact with no evidence of loosening or failure. He is in a slight amount of varus but he seems to be tolerating this well impression #healing reasonably well aligned left proximal tibia fracture    Assessment:     Healing left proximal tibia fracture    Plan: At this point I think he can be activity as tolerated I think he can follow-up on as-needed basis. Just trying to be thorough I have talked him a little bit about the fact that if he has increasing pain then I would like to see him back and just make sure that nothing has happened such as a failure of his hardware but as long as he is comfortable he does not need to return.     Signed By: Donovan Bloom MD     March 14, 2023

## 2023-04-08 RX ORDER — TAMSULOSIN HYDROCHLORIDE 0.4 MG/1
CAPSULE ORAL DAILY
Qty: 90 CAPSULE | Refills: 3 | OUTPATIENT
Start: 2023-04-08

## 2023-05-02 DIAGNOSIS — E78.2 MIXED HYPERLIPIDEMIA: ICD-10-CM

## 2023-05-02 RX ORDER — ATORVASTATIN CALCIUM 80 MG/1
TABLET, FILM COATED ORAL
Qty: 90 TABLET | Refills: 3 | OUTPATIENT
Start: 2023-05-02

## 2023-05-06 DIAGNOSIS — E78.2 MIXED HYPERLIPIDEMIA: ICD-10-CM

## 2023-05-08 RX ORDER — ATORVASTATIN CALCIUM 80 MG/1
TABLET, FILM COATED ORAL
Qty: 90 TABLET | Refills: 3 | OUTPATIENT
Start: 2023-05-08

## 2023-05-19 DIAGNOSIS — E78.2 MIXED HYPERLIPIDEMIA: ICD-10-CM

## 2023-05-19 RX ORDER — ATORVASTATIN CALCIUM 80 MG/1
TABLET, FILM COATED ORAL
Qty: 90 TABLET | Refills: 3 | OUTPATIENT
Start: 2023-05-19

## 2023-05-30 ENCOUNTER — HOSPITAL ENCOUNTER (OUTPATIENT)
Dept: LAB | Age: 78
Discharge: HOME | End: 2023-06-02

## 2023-05-30 DIAGNOSIS — I10 ESSENTIAL HYPERTENSION: ICD-10-CM

## 2023-05-30 LAB
ALBUMIN SERPL-MCNC: 3.6 G/DL (ref 3.2–4.6)
ALBUMIN/GLOB SERPL: 1.2 (ref 0.4–1.6)
ALP SERPL-CCNC: 155 U/L (ref 50–136)
ALT SERPL-CCNC: 22 U/L (ref 12–65)
ANION GAP SERPL CALC-SCNC: 7 MMOL/L (ref 2–11)
APPEARANCE UR: CLEAR
AST SERPL-CCNC: 12 U/L (ref 15–37)
BACTERIA URNS QL MICRO: NEGATIVE /HPF
BASOPHILS # BLD: 0.1 K/UL (ref 0–0.2)
BASOPHILS NFR BLD: 1 % (ref 0–2)
BILIRUB SERPL-MCNC: 0.5 MG/DL (ref 0.2–1.1)
BILIRUB UR QL: NEGATIVE
BUN SERPL-MCNC: 15 MG/DL (ref 8–23)
CALCIUM SERPL-MCNC: 9.3 MG/DL (ref 8.3–10.4)
CASTS URNS QL MICRO: ABNORMAL /LPF (ref 0–2)
CHLORIDE SERPL-SCNC: 107 MMOL/L (ref 101–110)
CHOLEST SERPL-MCNC: 108 MG/DL
CO2 SERPL-SCNC: 27 MMOL/L (ref 21–32)
COLOR UR: ABNORMAL
CREAT SERPL-MCNC: 0.9 MG/DL (ref 0.8–1.5)
DIFFERENTIAL METHOD BLD: NORMAL
EOSINOPHIL # BLD: 0.3 K/UL (ref 0–0.8)
EOSINOPHIL NFR BLD: 3 % (ref 0.5–7.8)
EPI CELLS #/AREA URNS HPF: ABNORMAL /HPF (ref 0–5)
ERYTHROCYTE [DISTWIDTH] IN BLOOD BY AUTOMATED COUNT: 13.5 % (ref 11.9–14.6)
GLOBULIN SER CALC-MCNC: 2.9 G/DL (ref 2.8–4.5)
GLUCOSE SERPL-MCNC: 101 MG/DL (ref 65–100)
GLUCOSE UR STRIP.AUTO-MCNC: NEGATIVE MG/DL
HCT VFR BLD AUTO: 45.8 % (ref 41.1–50.3)
HDLC SERPL-MCNC: 49 MG/DL (ref 40–60)
HDLC SERPL: 2.2
HGB BLD-MCNC: 14.7 G/DL (ref 13.6–17.2)
HGB UR QL STRIP: NEGATIVE
IMM GRANULOCYTES # BLD AUTO: 0 K/UL (ref 0–0.5)
IMM GRANULOCYTES NFR BLD AUTO: 0 % (ref 0–5)
KETONES UR QL STRIP.AUTO: ABNORMAL MG/DL
LDLC SERPL CALC-MCNC: 51 MG/DL
LEUKOCYTE ESTERASE UR QL STRIP.AUTO: NEGATIVE
LYMPHOCYTES # BLD: 2.5 K/UL (ref 0.5–4.6)
LYMPHOCYTES NFR BLD: 26 % (ref 13–44)
MCH RBC QN AUTO: 31.1 PG (ref 26.1–32.9)
MCHC RBC AUTO-ENTMCNC: 32.1 G/DL (ref 31.4–35)
MCV RBC AUTO: 97 FL (ref 82–102)
MONOCYTES # BLD: 0.8 K/UL (ref 0.1–1.3)
MONOCYTES NFR BLD: 8 % (ref 4–12)
NEUTS SEG # BLD: 6.2 K/UL (ref 1.7–8.2)
NEUTS SEG NFR BLD: 62 % (ref 43–78)
NITRITE UR QL STRIP.AUTO: NEGATIVE
NRBC # BLD: 0 K/UL (ref 0–0.2)
PH UR STRIP: 6 (ref 5–9)
PLATELET # BLD AUTO: 236 K/UL (ref 150–450)
PMV BLD AUTO: 10.5 FL (ref 9.4–12.3)
POTASSIUM SERPL-SCNC: 4.6 MMOL/L (ref 3.5–5.1)
PROT SERPL-MCNC: 6.5 G/DL (ref 6.3–8.2)
PROT UR STRIP-MCNC: ABNORMAL MG/DL
RBC # BLD AUTO: 4.72 M/UL (ref 4.23–5.6)
RBC #/AREA URNS HPF: ABNORMAL /HPF (ref 0–5)
SODIUM SERPL-SCNC: 141 MMOL/L (ref 133–143)
SP GR UR REFRACTOMETRY: 1.02 (ref 1–1.02)
TRIGL SERPL-MCNC: 40 MG/DL (ref 35–150)
TSH, 3RD GENERATION: 1.38 UIU/ML (ref 0.36–3.74)
UROBILINOGEN UR QL STRIP.AUTO: 1 EU/DL (ref 0.2–1)
VLDLC SERPL CALC-MCNC: 8 MG/DL (ref 6–23)
WBC # BLD AUTO: 9.9 K/UL (ref 4.3–11.1)
WBC URNS QL MICRO: ABNORMAL /HPF (ref 0–4)

## 2023-06-02 SDOH — ECONOMIC STABILITY: HOUSING INSECURITY
IN THE LAST 12 MONTHS, WAS THERE A TIME WHEN YOU DID NOT HAVE A STEADY PLACE TO SLEEP OR SLEPT IN A SHELTER (INCLUDING NOW)?: NO

## 2023-06-02 SDOH — HEALTH STABILITY: PHYSICAL HEALTH: ON AVERAGE, HOW MANY MINUTES DO YOU ENGAGE IN EXERCISE AT THIS LEVEL?: 0 MIN

## 2023-06-02 SDOH — HEALTH STABILITY: PHYSICAL HEALTH: ON AVERAGE, HOW MANY DAYS PER WEEK DO YOU ENGAGE IN MODERATE TO STRENUOUS EXERCISE (LIKE A BRISK WALK)?: 0 DAYS

## 2023-06-02 SDOH — ECONOMIC STABILITY: FOOD INSECURITY: WITHIN THE PAST 12 MONTHS, YOU WORRIED THAT YOUR FOOD WOULD RUN OUT BEFORE YOU GOT MONEY TO BUY MORE.: NEVER TRUE

## 2023-06-02 SDOH — ECONOMIC STABILITY: TRANSPORTATION INSECURITY
IN THE PAST 12 MONTHS, HAS LACK OF TRANSPORTATION KEPT YOU FROM MEETINGS, WORK, OR FROM GETTING THINGS NEEDED FOR DAILY LIVING?: NO

## 2023-06-02 SDOH — ECONOMIC STABILITY: INCOME INSECURITY: HOW HARD IS IT FOR YOU TO PAY FOR THE VERY BASICS LIKE FOOD, HOUSING, MEDICAL CARE, AND HEATING?: NOT HARD AT ALL

## 2023-06-02 SDOH — ECONOMIC STABILITY: FOOD INSECURITY: WITHIN THE PAST 12 MONTHS, THE FOOD YOU BOUGHT JUST DIDN'T LAST AND YOU DIDN'T HAVE MONEY TO GET MORE.: NEVER TRUE

## 2023-06-02 ASSESSMENT — LIFESTYLE VARIABLES
HOW OFTEN DO YOU HAVE SIX OR MORE DRINKS ON ONE OCCASION: 1
HOW MANY STANDARD DRINKS CONTAINING ALCOHOL DO YOU HAVE ON A TYPICAL DAY: 1
HOW OFTEN DO YOU HAVE A DRINK CONTAINING ALCOHOL: 2
HOW OFTEN DO YOU HAVE A DRINK CONTAINING ALCOHOL: MONTHLY OR LESS
HOW MANY STANDARD DRINKS CONTAINING ALCOHOL DO YOU HAVE ON A TYPICAL DAY: 1 OR 2

## 2023-06-02 ASSESSMENT — PATIENT HEALTH QUESTIONNAIRE - PHQ9
SUM OF ALL RESPONSES TO PHQ QUESTIONS 1-9: 1
SUM OF ALL RESPONSES TO PHQ9 QUESTIONS 1 & 2: 1
SUM OF ALL RESPONSES TO PHQ QUESTIONS 1-9: 1
1. LITTLE INTEREST OR PLEASURE IN DOING THINGS: 1
2. FEELING DOWN, DEPRESSED OR HOPELESS: 0

## 2023-06-05 ENCOUNTER — OFFICE VISIT (OUTPATIENT)
Dept: INTERNAL MEDICINE CLINIC | Facility: CLINIC | Age: 78
End: 2023-06-05
Payer: MEDICARE

## 2023-06-05 VITALS
HEIGHT: 66 IN | BODY MASS INDEX: 23.46 KG/M2 | RESPIRATION RATE: 16 BRPM | OXYGEN SATURATION: 99 % | HEART RATE: 88 BPM | WEIGHT: 146 LBS | DIASTOLIC BLOOD PRESSURE: 70 MMHG | TEMPERATURE: 98.6 F | SYSTOLIC BLOOD PRESSURE: 110 MMHG

## 2023-06-05 DIAGNOSIS — Z00.00 MEDICARE ANNUAL WELLNESS VISIT, SUBSEQUENT: Primary | ICD-10-CM

## 2023-06-05 DIAGNOSIS — Z23 NEED FOR SHINGLES VACCINE: ICD-10-CM

## 2023-06-05 DIAGNOSIS — Z86.73 HISTORY OF CVA (CEREBROVASCULAR ACCIDENT): ICD-10-CM

## 2023-06-05 DIAGNOSIS — R97.20 ELEVATED PSA: ICD-10-CM

## 2023-06-05 DIAGNOSIS — M81.0 AGE-RELATED OSTEOPOROSIS WITHOUT CURRENT PATHOLOGICAL FRACTURE: ICD-10-CM

## 2023-06-05 DIAGNOSIS — E78.2 MIXED HYPERLIPIDEMIA: ICD-10-CM

## 2023-06-05 DIAGNOSIS — I10 ESSENTIAL HYPERTENSION: ICD-10-CM

## 2023-06-05 DIAGNOSIS — B44.1 PULMONARY ASPERGILLOSIS (HCC): ICD-10-CM

## 2023-06-05 DIAGNOSIS — R05.2 SUBACUTE COUGH: ICD-10-CM

## 2023-06-05 DIAGNOSIS — D69.6 THROMBOCYTOPENIA (HCC): ICD-10-CM

## 2023-06-05 DIAGNOSIS — G81.94 LEFT HEMIPARESIS (HCC): ICD-10-CM

## 2023-06-05 DIAGNOSIS — F17.200 SMOKER: ICD-10-CM

## 2023-06-05 DIAGNOSIS — R73.03 PREDIABETES: ICD-10-CM

## 2023-06-05 DIAGNOSIS — M31.31 GRANULOMATOSIS WITH POLYANGIITIS WITH RENAL INVOLVEMENT (HCC): ICD-10-CM

## 2023-06-05 PROBLEM — M85.89 OSTEOPENIA OF MULTIPLE SITES: Status: RESOLVED | Noted: 2022-04-20 | Resolved: 2023-06-05

## 2023-06-05 PROBLEM — R04.2 HEMOPTYSIS: Status: RESOLVED | Noted: 2022-01-03 | Resolved: 2023-06-05

## 2023-06-05 PROCEDURE — 3074F SYST BP LT 130 MM HG: CPT | Performed by: INTERNAL MEDICINE

## 2023-06-05 PROCEDURE — 3078F DIAST BP <80 MM HG: CPT | Performed by: INTERNAL MEDICINE

## 2023-06-05 PROCEDURE — 1123F ACP DISCUSS/DSCN MKR DOCD: CPT | Performed by: INTERNAL MEDICINE

## 2023-06-05 PROCEDURE — G8420 CALC BMI NORM PARAMETERS: HCPCS | Performed by: INTERNAL MEDICINE

## 2023-06-05 PROCEDURE — 4004F PT TOBACCO SCREEN RCVD TLK: CPT | Performed by: INTERNAL MEDICINE

## 2023-06-05 PROCEDURE — G8427 DOCREV CUR MEDS BY ELIG CLIN: HCPCS | Performed by: INTERNAL MEDICINE

## 2023-06-05 PROCEDURE — 99214 OFFICE O/P EST MOD 30 MIN: CPT | Performed by: INTERNAL MEDICINE

## 2023-06-05 PROCEDURE — G0439 PPPS, SUBSEQ VISIT: HCPCS | Performed by: INTERNAL MEDICINE

## 2023-06-05 RX ORDER — ZOSTER VACCINE RECOMBINANT, ADJUVANTED 50 MCG/0.5
0.5 KIT INTRAMUSCULAR SEE ADMIN INSTRUCTIONS
Qty: 0.5 ML | Refills: 0 | Status: SHIPPED | OUTPATIENT
Start: 2023-06-05 | End: 2023-12-02

## 2023-06-05 RX ORDER — BENZONATATE 200 MG/1
200 CAPSULE ORAL 3 TIMES DAILY PRN
Qty: 30 CAPSULE | Refills: 1 | Status: SHIPPED | OUTPATIENT
Start: 2023-06-05 | End: 2023-06-15

## 2023-06-05 ASSESSMENT — ENCOUNTER SYMPTOMS
ABDOMINAL PAIN: 0
COUGH: 1
SHORTNESS OF BREATH: 0
RHINORRHEA: 0
BLOOD IN STOOL: 0

## 2023-06-05 NOTE — PROGRESS NOTES
Medications    Medication Sig Taking? Authorizing Provider   zoster recombinant adjuvanted vaccine (SHINGRIX) 50 MCG/0.5ML SUSR injection Inject 0.5 mLs into the muscle See Admin Instructions 1 dose now and repeat in 2-6 months Yes Lindy Garrett MD   benzonatate (TESSALON) 200 MG capsule Take 1 capsule by mouth 3 times daily as needed for Cough Yes Lindy Garrett MD   amLODIPine (NORVASC) 5 MG tablet Take 1 tablet by mouth daily Yes Lindy Garrett MD   alendronate (FOSAMAX) 70 MG tablet Take 1 tablet (70 mg) by mouth once a week Take with 8 ounces of water. Do not lay down for at least 30 minutes after taking. Separate from other medications and food/drink by 30 minutes.  Yes Historical Provider, MD   tamsulosin (FLOMAX) 0.4 MG capsule Take 1 capsule by mouth daily Yes Historical Provider, MD   Cholecalciferol (VITAMIN D3) 1.25 MG (37515 UT) CAPS Take 1.25 capsules by mouth daily Yes Historical Provider, MD   atorvastatin (LIPITOR) 80 MG tablet Take 1 tablet by mouth daily Yes Stephanie Aguirre, APRN - CNP   aspirin 81 MG EC tablet Take 1 tablet by mouth daily Yes Ar Automatic Reconciliation       CareTeam (Including outside providers/suppliers regularly involved in providing care):   Patient Care Team:  Lindy Garrett MD as PCP - Emma Isabel MD as PCP - Empaneled Provider     Reviewed and updated this visit:  Tobacco  Allergies  Meds  Problems  Med Hx  Surg Hx  Soc Hx  Fam Hx

## 2023-06-05 NOTE — PATIENT INSTRUCTIONS
Learning About Being Active as an Older Adult  Why is being active important as you get older? Being active is one of the best things you can do for your health. And it's never too late to start. Being active--or getting active, if you aren't already--has definite benefits. It can:  Give you more energy,  Keep your mind sharp. Improve balance to reduce your risk of falls. Help you manage chronic illness with fewer medicines. No matter how old you are, how fit you are, or what health problems you have, there is a form of activity that will work for you. And the more physical activity you can do, the better your overall health will be. What kinds of activity can help you stay healthy? Being more active will make your daily activities easier. Physical activity includes planned exercise and things you do in daily life. There are four types of activity:  Aerobic. Doing aerobic activity makes your heart and lungs strong. Includes walking, dancing, and gardening. Aim for at least 2½ hours spread throughout the week. It improves your energy and can help you sleep better. Muscle-strengthening. This type of activity can help maintain muscle and strengthen bones. Includes climbing stairs, using resistance bands, and lifting or carrying heavy loads. Aim for at least twice a week. It can help protect the knees and other joints. Stretching. Stretching gives you better range of motion in joints and muscles. Includes upper arm stretches, calf stretches, and gentle yoga. Aim for at least twice a week, preferably after your muscles are warmed up from other activities. It can help you function better in daily life. Balancing. This helps you stay coordinated and have good posture. Includes heel-to-toe walking, lorenzo chi, and certain types of yoga. Aim for at least 3 days a week. It can reduce your risk of falling.   Even if you have a hard time meeting the recommendations, it's better to be more active

## 2023-07-18 DIAGNOSIS — E78.2 MIXED HYPERLIPIDEMIA: ICD-10-CM

## 2023-07-18 RX ORDER — ATORVASTATIN CALCIUM 80 MG/1
TABLET, FILM COATED ORAL
Qty: 90 TABLET | Refills: 3 | OUTPATIENT
Start: 2023-07-18

## 2023-07-18 NOTE — TELEPHONE ENCOUNTER
Patient's wife called requesting refill of ATORVASTATIN 80 MG to [facility] [place], [place]. States the patient has about 7 pills left. Detail Level: Detailed Additional Notes: October Quality 110: Preventive Care And Screening: Influenza Immunization: Influenza Immunization Administered during Influenza season

## 2023-07-19 DIAGNOSIS — E78.2 MIXED HYPERLIPIDEMIA: ICD-10-CM

## 2023-07-19 RX ORDER — ATORVASTATIN CALCIUM 80 MG/1
80 TABLET, FILM COATED ORAL DAILY
Qty: 90 TABLET | Refills: 3 | Status: CANCELLED | OUTPATIENT
Start: 2023-07-19

## 2023-07-19 NOTE — TELEPHONE ENCOUNTER
Patient spouse called in requesting a refill for lipitor 80 mg to be sent to [facility] on [address].

## 2023-07-21 RX ORDER — ATORVASTATIN CALCIUM 80 MG/1
80 TABLET, FILM COATED ORAL DAILY
Qty: 90 TABLET | Refills: 3 | Status: SHIPPED | OUTPATIENT
Start: 2023-07-21

## 2023-09-14 DIAGNOSIS — R97.20 ELEVATED PSA: Primary | ICD-10-CM

## 2023-09-14 NOTE — TELEPHONE ENCOUNTER
Pt wife called, requ refill of flomax 0.4 mg capsule.     Sent to [facility]

## 2023-09-15 RX ORDER — TAMSULOSIN HYDROCHLORIDE 0.4 MG/1
0.4 CAPSULE ORAL DAILY
Qty: 90 CAPSULE | Refills: 3 | Status: SHIPPED | OUTPATIENT
Start: 2023-09-15

## 2023-09-15 NOTE — TELEPHONE ENCOUNTER
Medicare Wellness Visit  Plan for Preventive Care    A good way for you to stay healthy is to use preventive care. Medicare covers many services that can help you stay healthy. * The goal of these services is to find any health problems as quickly as possible. Finding problems early can help make them easier to treat. Your personal plan below lists the services you may need and when they are due. Health Maintenance Summary     Topic Due On Due Status Completed On    Immunization - Pneumococcal  Completed Claudio 15, 2018    [facility] Prescription sent to pharmacy, keep follow up appointment; the entire colon, seen through a flexible lighted tube. Â· Flexible Sigmoidoscopy: exam of the last third (sigmoid portion) of the colon and rectum, seen through a flexible lighted tube. Â· Cologuard DNA stool test: a sample of your stool is used to screen for cancer and unseen blood in your stool. Â· Fecal Occult Blood Test: a sample of your stool is studied to find any unseen blood    Flu Shot:  Â· An immunization that helps to prevent influenza (the flu). You should get this every year. The best time to get the shot is in the fall. Pneumococcal Shot:  â¢ Vaccines are available that can help prevent pneumococcal disease, which is any type of infection caused by Streptococcus pneumoniae bacteria. Their use can prevent some cases of pneumonia, meningitis, and sepsis. There are two types of pneumococcal vaccines:   o Conjugate vaccines (PCV-13 or Prevnar 13Â®) - helps protect against the 13 types of pneumococcal bacteria that are the most common causes of serious infections in children and adults. o Polysaccharide vaccine (PPSV23 or Vcryojren88Â®) - helps protect against 23 types of pneumococcal bacteria for patients who are recommended to get it. These vaccines should be given at least 12 months apart. A booster is usually not needed. Hepatitis B Shot:  Â· An immunization that helps to protect people from getting Hepatitis B. Hepatitis B is a virus that spreads through contact with infected blood or body fluids. Many people with the virus do not have symptoms. The virus can lead to serious problems, such as liver disease. Some people are at higher risk than others. Your doctor will tell you if you need this shot. Diabetes Screening:  Â· A test to measure sugar (glucose) in your blood is called a fasting blood sugar. Fasting means you cannot have food or drink for at least 8 hours before the test. This test can detect diabetes long before you may notice symptoms.     Glaucoma Screening:  Â· Glaucoma screening is performed by your eye doctor. The test measures the fluid pressure inside your eyes to determine if you have glaucoma. Hepatitis C Screening:  Â· A blood test to see if you have the hepatitis C virus. Hepatitis C attacks the liver and is a major cause of chronic liver disease. Medicare will cover a single screening for all adults born between [facility], or high risk patients (people who have injected illegal drugs or people who have had blood transfusions). High risk patients who continue to inject illegal drugs can be screened for Hepatitis C every year. Smoking and Tobacco-Use Cessation Counseling:  Â· Tobacco is the single greatest cause of disease and early death in our country today. Medication and counseling together can increase a personâs chance of quitting for good. Â· Medicare covers two quitting attempts per year, with four counseling sessions per attempt (eight sessions in a 12 month period)    Preventive Screening tests for Women    Screening Mammograms and Breast Exams:  Â· An x-ray of your breasts to check for breast cancer before you or your doctor may be able to feel it. If breast cancer is found early it can usually be treated with success. Pelvic Exams and Pap Tests:  Â· An exam to check for cervical and vaginal cancer. A Pap test is a lab test in which cells are taken from your cervix and sent to the lab to look for signs of cervical cancer. If cancer of the cervix is found early, chances for a cure are good. Testing can generally end at age 72, or if a woman has a hysterectomy for a benign condition. Your provider may recommend more frequent testing if certain abnormal results are found. Bone Mass Measurements:  Â· A painless x-ray of your bone density to see if you are at risk for a broken bone. Bone density refers to the thickness of bones or how tightly the bone tissue is packed.     Preventive Screening tests for Men    Prostate Screening:  Â· PSA - Prostate Cancer blood test.  Experts do not recommend routine screening of healthy men with no signs or symptoms of prostate disease. However, men should not ignore urinary symptoms, and should discuss their family history with their doctor. *Medicare pays for many preventive services to keep you healthy. For some of these services, you might have to pay a deductible, coinsurance, and / or copayment. The amounts vary depending on the type of services you need and the kind of Medicare health plan you have.

## 2023-11-08 ENCOUNTER — TELEPHONE (OUTPATIENT)
Dept: INTERNAL MEDICINE CLINIC | Facility: CLINIC | Age: 78
End: 2023-11-08

## 2023-11-08 NOTE — TELEPHONE ENCOUNTER
----- Message from April Nagle sent at 11/8/2023  9:07 AM EST -----  Subject: Message to Provider    QUESTIONS  Information for Provider? patients wife Trip Vega called in to let dr. Cindy Hayward   know patient is currently being treated for cancer, and gets his labs   drawn regularly at [facility] for this. patient does not want to come   to his lab appointment on 11/28 as he is due to get labs drawn at [facility]   that day for his cancer treatment. please call back to advise if this is   okay, thank you   ---------------------------------------------------------------------------  --------------  CALL BACK INFO  [phone]; OK to leave message on voicemail  ---------------------------------------------------------------------------  --------------  SCRIPT ANSWERS  Relationship to Patient? Spouse/Partner  Representative Name? Trip Vega  Is the representative on the Communication Release of Information (ONEAL)   form in Epic?  Yes

## 2023-11-09 NOTE — TELEPHONE ENCOUNTER
Talked to pt's spouse, Max Lopez, and informed her, per Dr. Socorro Thompson, that's fine, we can do additional labs on day of his appointment if needed.

## 2023-12-10 DIAGNOSIS — I10 ESSENTIAL HYPERTENSION: ICD-10-CM

## 2023-12-11 RX ORDER — AMLODIPINE BESYLATE 5 MG/1
5 TABLET ORAL DAILY
Qty: 90 TABLET | Refills: 3 | OUTPATIENT
Start: 2023-12-11

## 2023-12-26 ENCOUNTER — OFFICE VISIT (OUTPATIENT)
Dept: INTERNAL MEDICINE CLINIC | Facility: CLINIC | Age: 78
End: 2023-12-26
Payer: MEDICARE

## 2023-12-26 VITALS
BODY MASS INDEX: 21.89 KG/M2 | WEIGHT: 136.2 LBS | TEMPERATURE: 99.1 F | OXYGEN SATURATION: 99 % | DIASTOLIC BLOOD PRESSURE: 60 MMHG | HEART RATE: 97 BPM | SYSTOLIC BLOOD PRESSURE: 98 MMHG | RESPIRATION RATE: 16 BRPM | HEIGHT: 66 IN

## 2023-12-26 DIAGNOSIS — Z86.73 HISTORY OF CVA (CEREBROVASCULAR ACCIDENT): ICD-10-CM

## 2023-12-26 DIAGNOSIS — G81.94 LEFT HEMIPARESIS (HCC): ICD-10-CM

## 2023-12-26 DIAGNOSIS — R97.20 ELEVATED PSA: ICD-10-CM

## 2023-12-26 DIAGNOSIS — M31.31 GRANULOMATOSIS WITH POLYANGIITIS WITH RENAL INVOLVEMENT (HCC): ICD-10-CM

## 2023-12-26 DIAGNOSIS — F17.200 SMOKER: ICD-10-CM

## 2023-12-26 DIAGNOSIS — B44.1 PULMONARY ASPERGILLOSIS (HCC): ICD-10-CM

## 2023-12-26 DIAGNOSIS — M81.0 AGE-RELATED OSTEOPOROSIS WITHOUT CURRENT PATHOLOGICAL FRACTURE: ICD-10-CM

## 2023-12-26 DIAGNOSIS — E78.2 MIXED HYPERLIPIDEMIA: ICD-10-CM

## 2023-12-26 DIAGNOSIS — C34.90 ADENOCARCINOMA OF LUNG, UNSPECIFIED LATERALITY (HCC): ICD-10-CM

## 2023-12-26 DIAGNOSIS — D69.6 THROMBOCYTOPENIA (HCC): ICD-10-CM

## 2023-12-26 DIAGNOSIS — F51.01 PRIMARY INSOMNIA: ICD-10-CM

## 2023-12-26 DIAGNOSIS — I10 ESSENTIAL HYPERTENSION: Primary | ICD-10-CM

## 2023-12-26 DIAGNOSIS — R79.9 ABNORMAL BLOOD CHEMISTRY: ICD-10-CM

## 2023-12-26 PROCEDURE — 3074F SYST BP LT 130 MM HG: CPT | Performed by: INTERNAL MEDICINE

## 2023-12-26 PROCEDURE — G8420 CALC BMI NORM PARAMETERS: HCPCS | Performed by: INTERNAL MEDICINE

## 2023-12-26 PROCEDURE — 1123F ACP DISCUSS/DSCN MKR DOCD: CPT | Performed by: INTERNAL MEDICINE

## 2023-12-26 PROCEDURE — 4004F PT TOBACCO SCREEN RCVD TLK: CPT | Performed by: INTERNAL MEDICINE

## 2023-12-26 PROCEDURE — G8484 FLU IMMUNIZE NO ADMIN: HCPCS | Performed by: INTERNAL MEDICINE

## 2023-12-26 PROCEDURE — G8427 DOCREV CUR MEDS BY ELIG CLIN: HCPCS | Performed by: INTERNAL MEDICINE

## 2023-12-26 PROCEDURE — 3078F DIAST BP <80 MM HG: CPT | Performed by: INTERNAL MEDICINE

## 2023-12-26 PROCEDURE — 99214 OFFICE O/P EST MOD 30 MIN: CPT | Performed by: INTERNAL MEDICINE

## 2023-12-26 RX ORDER — AMLODIPINE BESYLATE 2.5 MG/1
2.5 TABLET ORAL DAILY
Qty: 90 TABLET | Refills: 3 | Status: SHIPPED | OUTPATIENT
Start: 2023-12-26

## 2023-12-26 RX ORDER — LIDOCAINE AND PRILOCAINE 25; 25 MG/G; MG/G
CREAM TOPICAL
COMMUNITY
Start: 2023-08-23

## 2023-12-26 RX ORDER — TIOTROPIUM BROMIDE AND OLODATEROL 3.124; 2.736 UG/1; UG/1
2 SPRAY, METERED RESPIRATORY (INHALATION) DAILY
COMMUNITY

## 2023-12-26 RX ORDER — SUCRALFATE ORAL 1 G/10ML
SUSPENSION ORAL
COMMUNITY

## 2023-12-26 RX ORDER — TRAZODONE HYDROCHLORIDE 50 MG/1
50 TABLET ORAL NIGHTLY
Qty: 90 TABLET | Refills: 1 | Status: SHIPPED | OUTPATIENT
Start: 2023-12-26

## 2023-12-26 NOTE — PROGRESS NOTES
with readings. Will decrease Amlodipine to 2.5 mg qd;        -     amLODIPine (NORVASC) 2.5 MG tablet; Take 1 tablet by mouth daily    Granulomatosis with polyangiitis with renal involvement ([address])  Has regular f/u w/ Rheumatology;     Pulmonary aspergillosis ([address])  Stable. Adenocarcinoma of lung, unspecified laterality [facility])  Has regular f/u w/ Oncology, repeat imaging scheduled in February; Left hemiparesis (HCC)  Stable. Age-related osteoporosis without current pathological fracture  -     Vitamin D 25 Hydroxy; Future    Thrombocytopenia (HCC)  -     CBC with Auto Differential; Future    Smoker  Discussed smoking cessation, is not yet ready to quit;     Mixed hyperlipidemia  Recommended low fat, low cholesterol diet, regular exercise. -     Comprehensive Metabolic Panel; Future  -     Lipid Panel; Future  -     T4, Free; Future  -     TSH; Future    History of CVA (cerebrovascular accident)  Stable, continue medication, diet. Elevated PSA  He would like to hold off on Urology referral for now;     -     PSA, Total and Free; Future    Abnormal blood chemistry  -     Hemoglobin A1C; Future    Primary insomnia  Instructed to take medications as prescribed, and to call if no improvement in symptoms. -     traZODone (DESYREL) 50 MG tablet; Take 1 tablet by mouth nightly          Follow-up and Dispositions    Return in about 6 months (around 6/26/2024), or if symptoms worsen or fail to improve, for medicare annual w/ labs. Medication Reconciliation:  Current Medications Verified: Current medications/ immunizations were reviewed, including purpose, with patient. Family History, Social History, Current and Past Medical History was reviewed with patient and updated at today's office visit. Medication Reconciliation list was given to patient/ family.   Patient was advised to discard old medication lists and provide all providers with current list at each visit and carry list with them in case

## 2024-06-18 DIAGNOSIS — F51.01 PRIMARY INSOMNIA: ICD-10-CM

## 2024-06-18 RX ORDER — TRAZODONE HYDROCHLORIDE 50 MG/1
50 TABLET ORAL NIGHTLY
Qty: 90 TABLET | Refills: 1 | OUTPATIENT
Start: 2024-06-18

## 2024-06-24 DIAGNOSIS — E78.2 MIXED HYPERLIPIDEMIA: ICD-10-CM

## 2024-06-24 RX ORDER — ATORVASTATIN CALCIUM 80 MG/1
80 TABLET, FILM COATED ORAL DAILY
Qty: 90 TABLET | Refills: 3 | OUTPATIENT
Start: 2024-06-24

## 2024-07-01 ENCOUNTER — TELEPHONE (OUTPATIENT)
Dept: INTERNAL MEDICINE CLINIC | Facility: CLINIC | Age: 79
End: 2024-07-01

## 2024-07-02 NOTE — TELEPHONE ENCOUNTER
Care Transitions Initial Follow Up Call    Outreach made within 2 business days of discharge: Yes    Patient: Kulwinder Gan Patient : 1945   MRN: [id number]  Reason for Admission: There are no discharge diagnoses documented for the most recent discharge.  Discharge Date: 22       Spoke with: Patient's Spouse    Discharge department/facility: [facility]    TCM Interactive Patient Contact:  Was patient able to fill all prescriptions: Yes  Was patient instructed to bring all medications to the follow-up visit: Yes  Is patient taking all medications as directed in the discharge summary? Yes  Does patient understand their discharge instructions: Yes  Does patient have questions or concerns that need addressed prior to 7-14 day follow up office visit: no    Scheduled appointment with PCP within 7-14 days    Follow Up  Future Appointments   Date Time Provider Department Center   2024  9:00 AM Sonia Ballard APRN - CNP [facility]       Lora De MA

## 2024-07-09 ENCOUNTER — OFFICE VISIT (OUTPATIENT)
Dept: INTERNAL MEDICINE CLINIC | Facility: CLINIC | Age: 79
End: 2024-07-09

## 2024-07-09 ENCOUNTER — NURSE ONLY (OUTPATIENT)
Dept: INTERNAL MEDICINE CLINIC | Facility: CLINIC | Age: 79
End: 2024-07-09

## 2024-07-09 VITALS
DIASTOLIC BLOOD PRESSURE: 78 MMHG | BODY MASS INDEX: 20.57 KG/M2 | SYSTOLIC BLOOD PRESSURE: 126 MMHG | OXYGEN SATURATION: 98 % | TEMPERATURE: 97.3 F | HEIGHT: 66 IN | HEART RATE: 100 BPM | WEIGHT: 128 LBS

## 2024-07-09 DIAGNOSIS — F51.01 PRIMARY INSOMNIA: ICD-10-CM

## 2024-07-09 DIAGNOSIS — Z09 HOSPITAL DISCHARGE FOLLOW-UP: Primary | ICD-10-CM

## 2024-07-09 DIAGNOSIS — E87.1 HYPONATREMIA: ICD-10-CM

## 2024-07-09 DIAGNOSIS — E27.40 ADRENAL INSUFFICIENCY (HCC): ICD-10-CM

## 2024-07-09 DIAGNOSIS — C34.90 ADENOCARCINOMA OF LUNG, UNSPECIFIED LATERALITY (HCC): ICD-10-CM

## 2024-07-09 LAB
ANION GAP SERPL CALC-SCNC: 12 MMOL/L (ref 9–18)
BUN SERPL-MCNC: 8 MG/DL (ref 8–23)
CALCIUM SERPL-MCNC: 9.6 MG/DL (ref 8.8–10.2)
CHLORIDE SERPL-SCNC: 103 MMOL/L (ref 98–107)
CO2 SERPL-SCNC: 25 MMOL/L (ref 20–28)
CREAT SERPL-MCNC: 0.87 MG/DL (ref 0.8–1.3)
GLUCOSE SERPL-MCNC: 82 MG/DL (ref 70–99)
POTASSIUM SERPL-SCNC: 4.5 MMOL/L (ref 3.5–5.1)
SODIUM SERPL-SCNC: 140 MMOL/L (ref 136–145)

## 2024-07-09 RX ORDER — SODIUM CHLORIDE 1 G/1
1000 TABLET ORAL 2 TIMES DAILY
COMMUNITY
Start: 2024-06-21

## 2024-07-09 RX ORDER — HYDROCORTISONE 10 MG/1
15 TABLET ORAL DAILY
COMMUNITY
Start: 2024-06-28 | End: 2024-07-28

## 2024-07-09 RX ORDER — TRAZODONE HYDROCHLORIDE 50 MG/1
50 TABLET ORAL NIGHTLY
Qty: 90 TABLET | Refills: 1 | Status: SHIPPED | OUTPATIENT
Start: 2024-07-09

## 2024-07-09 RX ORDER — ALBUTEROL SULFATE 90 UG/1
2 AEROSOL, METERED RESPIRATORY (INHALATION) EVERY 4 HOURS PRN
COMMUNITY
Start: 2024-05-14

## 2024-07-09 ASSESSMENT — ENCOUNTER SYMPTOMS
VOMITING: 0
COUGH: 0
CONSTIPATION: 0
DIARRHEA: 0
SHORTNESS OF BREATH: 0
NAUSEA: 0
ABDOMINAL PAIN: 0

## 2024-07-09 NOTE — PROGRESS NOTES
aspergillosis (HCC)    Sensory deficit, left    Elevated PSA    Lesion of right lung    Closed bicondylar fracture of left tibial plateau    Open fracture of distal end of fibula and tibia, left, type I or II, initial encounter    Thrombocytopenia (HCC)    Osteoporosis    Adenocarcinoma of lung (HCC)    Primary insomnia       Medication list at time of discharge reviewed: Yes    Medications marked \"taking\" at this time  Outpatient Medications Marked as Taking for the 7/9/24 encounter (Office Visit) with Sonia Ballard APRN - CNP   Medication Sig Dispense Refill    albuterol sulfate HFA (PROVENTIL;VENTOLIN;PROAIR) 108 (90 Base) MCG/ACT inhaler Inhale 2 puffs into the lungs every 4 hours as needed      hydrocortisone (CORTEF) 10 MG tablet Take 1.5 tablets by mouth daily      sodium chloride 1 g tablet Take 1 tablet by mouth 2 times daily      traZODone (DESYREL) 50 MG tablet Take 1 tablet by mouth nightly 90 tablet 1    lidocaine-prilocaine (EMLA) 2.5-2.5 % cream Apply topically as needed for mild pain (apply to port 30 min to 1 hr before chemo)      STIOLTO RESPIMAT 2.5-2.5 MCG/ACT AERS Inhale 2 puffs into the lungs daily      amLODIPine (NORVASC) 2.5 MG tablet Take 1 tablet by mouth daily 90 tablet 3    tamsulosin (FLOMAX) 0.4 MG capsule Take 1 capsule by mouth daily 90 capsule 3    atorvastatin (LIPITOR) 80 MG tablet Take 1 tablet by mouth daily 90 tablet 3    Cholecalciferol (VITAMIN D3) 1.25 MG (41196 UT) CAPS Take 1.25 capsules by mouth daily      aspirin 81 MG EC tablet Take 1 tablet by mouth daily          Medications patient taking as of now reconciled against medications ordered at time of hospital discharge: Yes    Review of Systems   Constitutional:  Negative for chills and fever.   Respiratory:  Negative for cough and shortness of breath.    Cardiovascular:  Negative for chest pain, palpitations and leg swelling.   Gastrointestinal:  Negative for abdominal pain, constipation, diarrhea, nausea and

## 2024-07-11 ENCOUNTER — TELEPHONE (OUTPATIENT)
Dept: INTERNAL MEDICINE CLINIC | Facility: CLINIC | Age: 79
End: 2024-07-11

## 2024-07-11 DIAGNOSIS — E27.40 ADRENAL INSUFFICIENCY (HCC): ICD-10-CM

## 2024-07-11 DIAGNOSIS — E87.1 HYPONATREMIA: Primary | ICD-10-CM

## 2024-07-11 NOTE — TELEPHONE ENCOUNTER
Called and lvm for patient to return my call.     Spoke with [facility] and there is not another endocrinologist that will accept a referral from our office. We need to know what endocrinologist patient is trying to get in and see.

## 2024-07-11 NOTE — TELEPHONE ENCOUNTER
Spoke with patients wife and she states patient cannot be seen by [facility] until October. Requesting referral to [facility] to see if they can see him any sooner. Referral pended.

## 2024-07-11 NOTE — TELEPHONE ENCOUNTER
Pt needs a referral to endocrinologist pe can't get an appt until Oct 3rd    Pt saw Sylvia loredo and was told if they can't get an earlier appt to call her.

## 2024-07-25 ENCOUNTER — OFFICE VISIT (OUTPATIENT)
Dept: ENDOCRINOLOGY | Age: 79
End: 2024-07-25

## 2024-07-25 VITALS
HEIGHT: 67 IN | DIASTOLIC BLOOD PRESSURE: 78 MMHG | WEIGHT: 132 LBS | OXYGEN SATURATION: 96 % | HEART RATE: 72 BPM | SYSTOLIC BLOOD PRESSURE: 116 MMHG | BODY MASS INDEX: 20.72 KG/M2

## 2024-07-25 DIAGNOSIS — E27.3 ADRENAL INSUFFICIENCY DUE TO CANCER THERAPY (HCC): Primary | ICD-10-CM

## 2024-07-25 PROBLEM — E27.49 SECONDARY ADRENAL INSUFFICIENCY (HCC): Status: ACTIVE | Noted: 2024-07-25

## 2024-07-25 RX ORDER — ONDANSETRON 8 MG/1
8 TABLET, ORALLY DISINTEGRATING ORAL 3 TIMES DAILY PRN
COMMUNITY

## 2024-07-25 RX ORDER — HYDROCODONE BITARTRATE AND ACETAMINOPHEN 5; 325 MG/1; MG/1
1 TABLET ORAL EVERY 6 HOURS PRN
COMMUNITY
Start: 2024-05-16

## 2024-07-25 RX ORDER — AZITHROMYCIN 250 MG/1
TABLET, FILM COATED ORAL
COMMUNITY
Start: 2024-05-16

## 2024-07-25 ASSESSMENT — ENCOUNTER SYMPTOMS
VOMITING: 0
COUGH: 0
ABDOMINAL DISTENTION: 0
COLOR CHANGE: 0
SHORTNESS OF BREATH: 0
ABDOMINAL PAIN: 0
CHEST TIGHTNESS: 0
NAUSEA: 1
CONSTIPATION: 0
DIARRHEA: 0

## 2024-07-25 NOTE — ASSESSMENT & PLAN NOTE
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  Additional contributing etiology could include steroids as well as opioids.  Workup included AM cortisol level <3 and ACTH level of 4.7 (x2).  Suspect severe ACTH deficiency and cortisol <3- adrenal glands do not respond normally to cosyntropin and therefore he does not need a test of ACTH reserve. Consider 3 months after completing his immunotherapy treatment.  Continue hydrocortisone 10 mg in the a.m. upon waking and 5 mg in the afternoon at 1400.  He does not need any refills at this time.  Significant time was spent at bedside discussing the timing of his hydrocortisone dose, sick day rules, and emergency injection.  Handouts were provided regarding sick day rules and hydrocortisone injection.  Also advised to get med alert bracelet with \" adrenal insufficiency on steroids.\"  No further testing needed at this time.

## 2024-08-28 DIAGNOSIS — E78.2 MIXED HYPERLIPIDEMIA: ICD-10-CM

## 2024-08-28 RX ORDER — ATORVASTATIN CALCIUM 80 MG/1
80 TABLET, FILM COATED ORAL DAILY
Qty: 90 TABLET | Refills: 0 | Status: SHIPPED | OUTPATIENT
Start: 2024-08-28

## 2024-08-28 NOTE — TELEPHONE ENCOUNTER
Medication Refill Request    Name of Medication : atorvastatin    Strength of Medication: 80 mg    Directions: once daily    30 day or 90 day supply: 90    [facility] [address]    Last Appt. Date: 7/9    Next Appt. Date: 10/9    Additional Information For Provider:

## 2024-08-30 DIAGNOSIS — R97.20 ELEVATED PSA: ICD-10-CM

## 2024-08-30 RX ORDER — TAMSULOSIN HYDROCHLORIDE 0.4 MG/1
CAPSULE ORAL DAILY
Qty: 90 CAPSULE | Refills: 3 | OUTPATIENT
Start: 2024-08-30

## 2024-10-09 ENCOUNTER — OFFICE VISIT (OUTPATIENT)
Dept: INTERNAL MEDICINE CLINIC | Facility: CLINIC | Age: 79
End: 2024-10-09

## 2024-10-09 VITALS
DIASTOLIC BLOOD PRESSURE: 78 MMHG | HEIGHT: 67 IN | TEMPERATURE: 99.5 F | BODY MASS INDEX: 21.63 KG/M2 | HEART RATE: 94 BPM | OXYGEN SATURATION: 99 % | WEIGHT: 137.8 LBS | SYSTOLIC BLOOD PRESSURE: 130 MMHG | RESPIRATION RATE: 16 BRPM

## 2024-10-09 DIAGNOSIS — C34.90 ADENOCARCINOMA OF LUNG, UNSPECIFIED LATERALITY (HCC): ICD-10-CM

## 2024-10-09 DIAGNOSIS — Z09 HOSPITAL DISCHARGE FOLLOW-UP: Primary | ICD-10-CM

## 2024-10-09 DIAGNOSIS — R79.9 ABNORMAL BLOOD CHEMISTRY: ICD-10-CM

## 2024-10-09 DIAGNOSIS — T18.128S FOOD IMPACTION OF ESOPHAGUS, SEQUELA: ICD-10-CM

## 2024-10-09 DIAGNOSIS — Z86.73 HISTORY OF CVA (CEREBROVASCULAR ACCIDENT): ICD-10-CM

## 2024-10-09 DIAGNOSIS — E78.2 MIXED HYPERLIPIDEMIA: ICD-10-CM

## 2024-10-09 DIAGNOSIS — E27.3 ADRENAL INSUFFICIENCY DUE TO CANCER THERAPY (HCC): ICD-10-CM

## 2024-10-09 DIAGNOSIS — I10 ESSENTIAL HYPERTENSION: ICD-10-CM

## 2024-10-09 DIAGNOSIS — E55.9 VITAMIN D INSUFFICIENCY: ICD-10-CM
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
## 2024-10-09 DIAGNOSIS — R97.20 ELEVATED PSA: ICD-10-CM

## 2024-10-09 DIAGNOSIS — G81.94 LEFT HEMIPARESIS (HCC): ICD-10-CM

## 2024-10-09 DIAGNOSIS — D69.6 THROMBOCYTOPENIA (HCC): ICD-10-CM

## 2024-10-09 DIAGNOSIS — F17.200 SMOKER: ICD-10-CM

## 2024-10-09 DIAGNOSIS — M81.0 AGE-RELATED OSTEOPOROSIS WITHOUT CURRENT PATHOLOGICAL FRACTURE: ICD-10-CM

## 2024-10-09 PROBLEM — B44.1 PULMONARY ASPERGILLOSIS (HCC): Status: RESOLVED | Noted: 2022-01-13 | Resolved: 2024-10-09

## 2024-10-09 RX ORDER — TAMSULOSIN HYDROCHLORIDE 0.4 MG/1
0.4 CAPSULE ORAL DAILY
Qty: 90 CAPSULE | Refills: 3 | Status: SHIPPED | OUTPATIENT
Start: 2024-10-09

## 2024-10-09 RX ORDER — HYDROCORTISONE 5 MG/1
TABLET ORAL
COMMUNITY
Start: 2024-09-14

## 2024-10-09 RX ORDER — HYDROCORTISONE 10 MG/1
TABLET ORAL
COMMUNITY
Start: 2024-09-16

## 2024-10-09 ASSESSMENT — ENCOUNTER SYMPTOMS
COUGH: 0
ABDOMINAL PAIN: 0
SHORTNESS OF BREATH: 0
BLOOD IN STOOL: 0
RHINORRHEA: 0

## 2024-10-09 NOTE — PROGRESS NOTES
Anticipated Discharge Disposition: SNF- [facility]     Action: LSW messaged Murray HUGHES requesting f/u with [facility] for bed availability and transport for today.     Pt discussed during morning rounds. Pt still medically cleared for transfer.     Barriers to Discharge: None     Plan: LSW to await bed availability/transport time, LSW to assist as needed     Addendum 0951  LSW informed by Murray HUGHES that [facility] can take pt today. However, LSW to coordinate transport.   LSW provided update to Dr. Baker and requested d/c summary.     Addendum 1000  LSW faxed transport request form, facesheet, and approved service with verbal consent from supervisor, Breanna Moise to [facility].   LSW also messaged [facility] via [employer] to inform that forms have been faxed.     LSW to await transport time.     Addendum 1112  LSW informed by Izabel with the [facility] that transport time has been pushed to [phone]. Bedside RN, Mayda villar.   LSW met with pt at bedside to provide update.     Addendum 1116  LSW called pt's spouse, Mercedes to provide update.     Addendum 1129  D/c summary is available. LSW able to complete transfer packet. LSW to place in pt's chart.    present medications, continue as prescribed.    Age-related osteoporosis without current pathological fracture  Stable.     Thrombocytopenia (HCC)  -     CBC with Auto Differential; Future    Mixed hyperlipidemia  -     Comprehensive Metabolic Panel; Future  -     Lipid Panel; Future  -     TSH; Future  -     T4, Free; Future  -     Urinalysis; Future    History of CVA (cerebrovascular accident)  Stable, continue medication, diet.     Smoker  Discussed smoking cessation in detail, is not yet ready to quit, but has cut back to half pack per day;     Abnormal blood chemistry  -     Hemoglobin A1C; Future    Vitamin D insufficiency  -     Vitamin D 25 Hydroxy; Future          Follow-up and Dispositions    Return in about 6 weeks (around 11/20/2024), or if symptoms worsen or fail to improve, for medicare annual w/ labs.         Medication Reconciliation:  Current Medications Verified: Current medications/ immunizations were reviewed, including purpose, with patient.  Family History, Social History, Current and Past Medical History was reviewed with patient and updated at today's office visit.  Medication Reconciliation list was given to patient/ family.  Patient was advised to discard old medication lists and provide all providers with current list at each visit and carry list with them in case of emergency.    Completed By:   Hannah Quintana MD    [facility]  [address] [place] [address], [address]  [place] [address]  [phone] fax  9:33 AM

## 2024-11-24 DIAGNOSIS — E78.2 MIXED HYPERLIPIDEMIA: ICD-10-CM

## 2024-11-24 RX ORDER — ATORVASTATIN CALCIUM 80 MG/1
80 TABLET, FILM COATED ORAL DAILY
Qty: 90 TABLET | Refills: 0 | OUTPATIENT
Start: 2024-11-24

## 2024-12-19 DIAGNOSIS — I10 ESSENTIAL HYPERTENSION: ICD-10-CM

## 2024-12-19 RX ORDER — AMLODIPINE BESYLATE 2.5 MG/1
2.5 TABLET ORAL DAILY
Qty: 90 TABLET | Refills: 3 | OUTPATIENT
Start: 2024-12-19

## 2024-12-21 DIAGNOSIS — F51.01 PRIMARY INSOMNIA: ICD-10-CM

## 2024-12-22 RX ORDER — TRAZODONE HYDROCHLORIDE 50 MG/1
50 TABLET ORAL NIGHTLY
Qty: 90 TABLET | Refills: 1 | OUTPATIENT
Start: 2024-12-22

## 2025-01-21 DIAGNOSIS — E78.2 MIXED HYPERLIPIDEMIA: ICD-10-CM

## 2025-01-21 DIAGNOSIS — R79.9 ABNORMAL BLOOD CHEMISTRY: ICD-10-CM

## 2025-01-21 DIAGNOSIS — D69.6 THROMBOCYTOPENIA (HCC): ICD-10-CM

## 2025-01-21 DIAGNOSIS — R97.20 ELEVATED PSA: ICD-10-CM

## 2025-01-21 DIAGNOSIS — E55.9 VITAMIN D INSUFFICIENCY: ICD-10-CM

## 2025-01-21 LAB
25(OH)D3 SERPL-MCNC: 62 NG/ML (ref 30–100)
ALBUMIN SERPL-MCNC: 3.2 G/DL (ref 3.2–4.6)
ALBUMIN/GLOB SERPL: 1.1 (ref 1–1.9)
ALP SERPL-CCNC: 92 U/L (ref 40–129)
ALT SERPL-CCNC: 11 U/L (ref 8–55)
ANION GAP SERPL CALC-SCNC: 12 MMOL/L (ref 7–16)
APPEARANCE UR: CLEAR
AST SERPL-CCNC: 14 U/L (ref 15–37)
BACTERIA URNS QL MICRO: 0 /HPF
BASOPHILS # BLD: 0.05 K/UL (ref 0–0.2)
BASOPHILS NFR BLD: 0.6 % (ref 0–2)
BILIRUB SERPL-MCNC: 0.5 MG/DL (ref 0–1.2)
BILIRUB UR QL: NEGATIVE
BUN SERPL-MCNC: 12 MG/DL (ref 8–23)
CALCIUM SERPL-MCNC: 9.3 MG/DL (ref 8.8–10.2)
CHLORIDE SERPL-SCNC: 101 MMOL/L (ref 98–107)
CHOLEST SERPL-MCNC: 111 MG/DL (ref 0–200)
CO2 SERPL-SCNC: 27 MMOL/L (ref 20–29)
COLOR UR: ABNORMAL
CREAT SERPL-MCNC: 0.88 MG/DL (ref 0.8–1.3)
DIFFERENTIAL METHOD BLD: NORMAL
EOSINOPHIL # BLD: 0.1 K/UL (ref 0–0.8)
EOSINOPHIL NFR BLD: 1.2 % (ref 0.5–7.8)
EPI CELLS #/AREA URNS HPF: ABNORMAL /HPF
ERYTHROCYTE [DISTWIDTH] IN BLOOD BY AUTOMATED COUNT: 12.7 % (ref 11.9–14.6)
EST. AVERAGE GLUCOSE BLD GHB EST-MCNC: 108 MG/DL
GLOBULIN SER CALC-MCNC: 2.9 G/DL (ref 2.3–3.5)
GLUCOSE SERPL-MCNC: 81 MG/DL (ref 70–99)
GLUCOSE UR STRIP.AUTO-MCNC: NEGATIVE MG/DL
HBA1C MFR BLD: 5.4 % (ref 0–5.6)
HCT VFR BLD AUTO: 44.7 % (ref 41.1–50.3)
HDLC SERPL-MCNC: 41 MG/DL (ref 40–60)
HDLC SERPL: 2.7 (ref 0–5)
HGB BLD-MCNC: 14.6 G/DL (ref 13.6–17.2)
HGB UR QL STRIP: ABNORMAL
IMM GRANULOCYTES # BLD AUTO: 0.03 K/UL (ref 0–0.5)
IMM GRANULOCYTES NFR BLD AUTO: 0.4 % (ref 0–5)
KETONES UR QL STRIP.AUTO: NEGATIVE MG/DL
LDLC SERPL CALC-MCNC: 57 MG/DL (ref 0–100)
LEUKOCYTE ESTERASE UR QL STRIP.AUTO: NEGATIVE
LYMPHOCYTES # BLD: 1.24 K/UL (ref 0.5–4.6)
LYMPHOCYTES NFR BLD: 14.8 % (ref 13–44)
MCH RBC QN AUTO: 31.6 PG (ref 26.1–32.9)
MCHC RBC AUTO-ENTMCNC: 32.7 G/DL (ref 31.4–35)
MCV RBC AUTO: 96.8 FL (ref 82–102)
MONOCYTES # BLD: 0.56 K/UL (ref 0.1–1.3)
MONOCYTES NFR BLD: 6.7 % (ref 4–12)
NEUTS SEG # BLD: 6.42 K/UL (ref 1.7–8.2)
NEUTS SEG NFR BLD: 76.3 % (ref 43–78)
NITRITE UR QL STRIP.AUTO: NEGATIVE
NRBC # BLD: 0 K/UL (ref 0–0.2)
OTHER OBSERVATIONS: ABNORMAL
PH UR STRIP: 8 (ref 5–9)
PLATELET # BLD AUTO: 234 K/UL (ref 150–450)
PMV BLD AUTO: 9.8 FL (ref 9.4–12.3)
POTASSIUM SERPL-SCNC: 5.1 MMOL/L (ref 3.5–5.1)
PROT SERPL-MCNC: 6.1 G/DL (ref 6.3–8.2)
PROT UR STRIP-MCNC: 30 MG/DL
PSA FREE MFR SERPL: 16.8 %
PSA FREE SERPL-MCNC: 1.4 NG/ML
PSA SERPL-MCNC: 8.1 NG/ML (ref 0–4)
RBC # BLD AUTO: 4.62 M/UL (ref 4.23–5.6)
RBC #/AREA URNS HPF: ABNORMAL /HPF
SODIUM SERPL-SCNC: 140 MMOL/L (ref 136–145)
SP GR UR REFRACTOMETRY: 1.01 (ref 1–1.02)
T4 FREE SERPL-MCNC: 1.4 NG/DL (ref 0.9–1.7)
TRIGL SERPL-MCNC: 65 MG/DL (ref 0–150)
TSH, 3RD GENERATION: 1.95 UIU/ML (ref 0.27–4.2)
UROBILINOGEN UR QL STRIP.AUTO: 1 EU/DL (ref 0.2–1)
VLDLC SERPL CALC-MCNC: 13 MG/DL (ref 6–23)
WBC # BLD AUTO: 8.4 K/UL (ref 4.3–11.1)
WBC URNS QL MICRO: ABNORMAL /HPF

## 2025-01-26 SDOH — ECONOMIC STABILITY: FOOD INSECURITY: WITHIN THE PAST 12 MONTHS, THE FOOD YOU BOUGHT JUST DIDN'T LAST AND YOU DIDN'T HAVE MONEY TO GET MORE.: NEVER TRUE

## 2025-01-26 SDOH — ECONOMIC STABILITY: INCOME INSECURITY: IN THE LAST 12 MONTHS, WAS THERE A TIME WHEN YOU WERE NOT ABLE TO PAY THE MORTGAGE OR RENT ON TIME?: NO

## 2025-01-26 SDOH — ECONOMIC STABILITY: FOOD INSECURITY: WITHIN THE PAST 12 MONTHS, YOU WORRIED THAT YOUR FOOD WOULD RUN OUT BEFORE YOU GOT MONEY TO BUY MORE.: NEVER TRUE

## 2025-01-26 SDOH — ECONOMIC STABILITY: TRANSPORTATION INSECURITY
IN THE PAST 12 MONTHS, HAS THE LACK OF TRANSPORTATION KEPT YOU FROM MEDICAL APPOINTMENTS OR FROM GETTING MEDICATIONS?: NO

## 2025-01-26 SDOH — HEALTH STABILITY: PHYSICAL HEALTH: ON AVERAGE, HOW MANY DAYS PER WEEK DO YOU ENGAGE IN MODERATE TO STRENUOUS EXERCISE (LIKE A BRISK WALK)?: 0 DAYS

## 2025-01-26 ASSESSMENT — LIFESTYLE VARIABLES
HOW MANY STANDARD DRINKS CONTAINING ALCOHOL DO YOU HAVE ON A TYPICAL DAY: PATIENT DOES NOT DRINK
HOW OFTEN DO YOU HAVE SIX OR MORE DRINKS ON ONE OCCASION: 1
HOW OFTEN DO YOU HAVE A DRINK CONTAINING ALCOHOL: NEVER
HOW MANY STANDARD DRINKS CONTAINING ALCOHOL DO YOU HAVE ON A TYPICAL DAY: 0
HOW OFTEN DO YOU HAVE A DRINK CONTAINING ALCOHOL: 1

## 2025-01-26 ASSESSMENT — PATIENT HEALTH QUESTIONNAIRE - PHQ9
2. FEELING DOWN, DEPRESSED OR HOPELESS: SEVERAL DAYS
SUM OF ALL RESPONSES TO PHQ QUESTIONS 1-9: 2
SUM OF ALL RESPONSES TO PHQ9 QUESTIONS 1 & 2: 2
1. LITTLE INTEREST OR PLEASURE IN DOING THINGS: SEVERAL DAYS

## 2025-01-27 ENCOUNTER — OFFICE VISIT (OUTPATIENT)
Dept: ENDOCRINOLOGY | Age: 80
End: 2025-01-27
Payer: MEDICARE

## 2025-01-27 ENCOUNTER — TELEPHONE (OUTPATIENT)
Dept: ENDOCRINOLOGY | Age: 80
End: 2025-01-27

## 2025-01-27 VITALS
HEART RATE: 106 BPM | RESPIRATION RATE: 12 BRPM | OXYGEN SATURATION: 98 % | BODY MASS INDEX: 21.28 KG/M2 | WEIGHT: 132.4 LBS | HEIGHT: 66 IN | DIASTOLIC BLOOD PRESSURE: 60 MMHG | SYSTOLIC BLOOD PRESSURE: 118 MMHG

## 2025-01-27 DIAGNOSIS — E27.3 ADRENAL INSUFFICIENCY DUE TO CANCER THERAPY (HCC): Primary | ICD-10-CM

## 2025-01-27 PROCEDURE — 1159F MED LIST DOCD IN RCRD: CPT | Performed by: STUDENT IN AN ORGANIZED HEALTH CARE EDUCATION/TRAINING PROGRAM

## 2025-01-27 PROCEDURE — 99215 OFFICE O/P EST HI 40 MIN: CPT | Performed by: STUDENT IN AN ORGANIZED HEALTH CARE EDUCATION/TRAINING PROGRAM

## 2025-01-27 PROCEDURE — 1160F RVW MEDS BY RX/DR IN RCRD: CPT | Performed by: STUDENT IN AN ORGANIZED HEALTH CARE EDUCATION/TRAINING PROGRAM

## 2025-01-27 PROCEDURE — 4004F PT TOBACCO SCREEN RCVD TLK: CPT | Performed by: STUDENT IN AN ORGANIZED HEALTH CARE EDUCATION/TRAINING PROGRAM

## 2025-01-27 PROCEDURE — G8420 CALC BMI NORM PARAMETERS: HCPCS | Performed by: STUDENT IN AN ORGANIZED HEALTH CARE EDUCATION/TRAINING PROGRAM

## 2025-01-27 PROCEDURE — 1123F ACP DISCUSS/DSCN MKR DOCD: CPT | Performed by: STUDENT IN AN ORGANIZED HEALTH CARE EDUCATION/TRAINING PROGRAM

## 2025-01-27 PROCEDURE — 3074F SYST BP LT 130 MM HG: CPT | Performed by: STUDENT IN AN ORGANIZED HEALTH CARE EDUCATION/TRAINING PROGRAM

## 2025-01-27 PROCEDURE — G8427 DOCREV CUR MEDS BY ELIG CLIN: HCPCS | Performed by: STUDENT IN AN ORGANIZED HEALTH CARE EDUCATION/TRAINING PROGRAM

## 2025-01-27 PROCEDURE — 3078F DIAST BP <80 MM HG: CPT | Performed by: STUDENT IN AN ORGANIZED HEALTH CARE EDUCATION/TRAINING PROGRAM

## 2025-01-27 PROCEDURE — G2211 COMPLEX E/M VISIT ADD ON: HCPCS | Performed by: STUDENT IN AN ORGANIZED HEALTH CARE EDUCATION/TRAINING PROGRAM

## 2025-01-27 RX ORDER — CEFUROXIME AXETIL 500 MG/1
500 TABLET ORAL 2 TIMES DAILY
COMMUNITY
Start: 2025-01-25 | End: 2025-02-01

## 2025-01-27 RX ORDER — HYDROCORTISONE 5 MG/1
TABLET ORAL
Qty: 100 TABLET | Refills: 1 | Status: SHIPPED | OUTPATIENT
Start: 2025-01-27 | End: 2025-01-28 | Stop reason: SDUPTHER

## 2025-01-27 RX ORDER — DOXYCYCLINE 100 MG/1
100 CAPSULE ORAL 2 TIMES DAILY
COMMUNITY
Start: 2025-01-25 | End: 2025-01-30

## 2025-01-27 RX ORDER — HYDROCORTISONE 10 MG/1
10 TABLET ORAL DAILY
Qty: 100 TABLET | Refills: 1 | Status: SHIPPED | OUTPATIENT
Start: 2025-01-27

## 2025-01-27 ASSESSMENT — ENCOUNTER SYMPTOMS
COUGH: 0
DIARRHEA: 0
VOMITING: 0
SHORTNESS OF BREATH: 0
CONSTIPATION: 0
COLOR CHANGE: 0
NAUSEA: 1
ABDOMINAL DISTENTION: 0
ABDOMINAL PAIN: 0
CHEST TIGHTNESS: 0

## 2025-01-27 NOTE — PROGRESS NOTES
DO [facility]  [address], [address]  [place] [address]        Kulwinder Gan is a 79 y.o. male who presents for follow up, evaluation and management of   Adrenal insufficiency due to cancer therapy  Est care/ LOV 7/25/2024    ASSESSMENT AND PLAN:    1. Adrenal insufficiency due to cancer therapy (HCC)  Overview:  7/2024 Significant time was spent at bedside discussing the timing of his hydrocortisone dose, sick day rules, and emergency injection.  Handouts were provided regarding sick day rules and hydrocortisone injection.  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  Additional contributing etiology could include steroids as well as opioids.  Workup included AM cortisol level <3 and ACTH level of 4.7 (x2).  Suspect severe ACTH deficiency and cortisol <3- adrenal glands do not respond normally to cosyntropin and therefore he does not need a test of ACTH reserve. Consider 3 months after completing his immunotherapy treatment.  Continue hydrocortisone 10 mg in the a.m. upon waking and 5 mg in the afternoon at 1400. Refills provided today.  No further testing needed at this time.  Wearing med alert bracelet in office today.  Has not used emergency injection.  He is currently on antibiotics for resp infxn, he will double steroid dose while on antibiotics.    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           Interval history:  9/2024- ED visit for choking on steak    10/7/2024- Reclast, rheum monitoring OP.     Durvalumab, completing on

## 2025-01-27 NOTE — TELEPHONE ENCOUNTER
Pharmacy needs specifics on amount of pills patient needs to take on the cortef it can't just say take between 2-3 pm for the 5 mg.

## 2025-01-27 NOTE — ASSESSMENT & PLAN NOTE
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  Additional contributing etiology could include steroids as well as opioids.  Workup included AM cortisol level <3 and ACTH level of 4.7 (x2).  Suspect severe ACTH deficiency and cortisol <3- adrenal glands do not respond normally to cosyntropin and therefore he does not need a test of ACTH reserve. Consider 3 months after completing his immunotherapy treatment.  Continue hydrocortisone 10 mg in the a.m. upon waking and 5 mg in the afternoon at 1400. Refills provided today.  No further testing needed at this time.  Wearing med alert bracelet in office today.  Has not used emergency injection.  He is currently on antibiotics for resp infxn, he will double steroid dose while on antibiotics.

## 2025-01-28 ENCOUNTER — OFFICE VISIT (OUTPATIENT)
Dept: INTERNAL MEDICINE CLINIC | Facility: CLINIC | Age: 80
End: 2025-01-28

## 2025-01-28 VITALS
SYSTOLIC BLOOD PRESSURE: 92 MMHG | TEMPERATURE: 98.1 F | DIASTOLIC BLOOD PRESSURE: 64 MMHG | RESPIRATION RATE: 16 BRPM | OXYGEN SATURATION: 98 % | WEIGHT: 132.6 LBS | HEIGHT: 65 IN | HEART RATE: 103 BPM | BODY MASS INDEX: 22.09 KG/M2

## 2025-01-28 DIAGNOSIS — I10 ESSENTIAL HYPERTENSION: ICD-10-CM

## 2025-01-28 DIAGNOSIS — E27.3 ADRENAL INSUFFICIENCY DUE TO CANCER THERAPY (HCC): ICD-10-CM

## 2025-01-28 DIAGNOSIS — M31.31 WEGENER'S GRANULOMATOSIS WITH RENAL INVOLVEMENT (HCC): ICD-10-CM

## 2025-01-28 DIAGNOSIS — Z86.73 HISTORY OF CVA (CEREBROVASCULAR ACCIDENT): ICD-10-CM

## 2025-01-28 DIAGNOSIS — Z00.00 MEDICARE ANNUAL WELLNESS VISIT, SUBSEQUENT: Primary | ICD-10-CM

## 2025-01-28 DIAGNOSIS — R31.29 OTHER MICROSCOPIC HEMATURIA: ICD-10-CM

## 2025-01-28 DIAGNOSIS — M81.0 AGE-RELATED OSTEOPOROSIS WITHOUT CURRENT PATHOLOGICAL FRACTURE: ICD-10-CM

## 2025-01-28 DIAGNOSIS — D69.6 THROMBOCYTOPENIA (HCC): ICD-10-CM

## 2025-01-28 DIAGNOSIS — C34.90 ADENOCARCINOMA OF LUNG, UNSPECIFIED LATERALITY (HCC): ICD-10-CM

## 2025-01-28 DIAGNOSIS — J18.9 PNEUMONIA OF LEFT UPPER LOBE DUE TO INFECTIOUS ORGANISM: ICD-10-CM

## 2025-01-28 DIAGNOSIS — F17.200 SMOKER: ICD-10-CM

## 2025-01-28 DIAGNOSIS — M31.31 GRANULOMATOSIS WITH POLYANGIITIS WITH RENAL INVOLVEMENT (HCC): ICD-10-CM

## 2025-01-28 DIAGNOSIS — R97.20 ELEVATED PSA: ICD-10-CM

## 2025-01-28 DIAGNOSIS — F51.01 PRIMARY INSOMNIA: ICD-10-CM

## 2025-01-28 DIAGNOSIS — G81.94 LEFT HEMIPARESIS (HCC): ICD-10-CM

## 2025-01-28 DIAGNOSIS — E78.2 MIXED HYPERLIPIDEMIA: ICD-10-CM

## 2025-01-28 PROBLEM — R91.1 LESION OF RIGHT LUNG: Status: RESOLVED | Noted: 2021-07-13 | Resolved: 2025-01-28

## 2025-01-28 RX ORDER — ZOLPIDEM TARTRATE 5 MG/1
5 TABLET ORAL NIGHTLY PRN
Qty: 30 TABLET | Refills: 2 | Status: SHIPPED | OUTPATIENT
Start: 2025-01-28 | End: 2025-04-28

## 2025-01-28 RX ORDER — HYDROCORTISONE 5 MG/1
TABLET ORAL
Qty: 100 TABLET | Refills: 1 | Status: SHIPPED | OUTPATIENT
Start: 2025-01-28

## 2025-01-28 ASSESSMENT — ENCOUNTER SYMPTOMS
RHINORRHEA: 0
BLOOD IN STOOL: 0
ABDOMINAL PAIN: 0
SHORTNESS OF BREATH: 0
COUGH: 1

## 2025-01-28 NOTE — PROGRESS NOTES
[facility]      [facility]    Patient Name: Kulwinder Gan  :  1945    Subjective:    Chief Complaint:  Chief Complaint   Patient presents with    Medicare AWV         HPI Here for Medicare Wellness visit; patient had fasting labs done recently and results were reviewed in detail today;   He has adrenal insufficiency due to cancer therapy, saw Dr. Gao for f/u yesterday; he is to continue hydrocortisone 10 mg in am and 5 mg in afternoon; recommended to wear med alert bracelet; he is to f/u in 3 months; records reviewed;  He was seen at [facility] 25 for cough, noted to have PEGGY pneumonia; he was prescribed Ceftin 500 mg bid x7 days and Doxy 100 mg bid x5 days; records reviewed; he is taking abxs as prescribed; he denies fever, chills, but still has cough productive yellow phlegm;   He has hx of adenocarcinoma of L lung, saw Dr. Dempsey for f/u 25; repeat imaging planned for next month; records reviewed;   He has hx of osteoporosis, Wegener's granulomatosis, saw Dr. Jones for f/u 25; he is to continue Reclast; no current symptoms of active vasculitis; he is to f/u in 6 months; records reviewed;   He is still smoking, down to half pack per day; he is not interested in quitting;   He has elevated PSA on recent labs, 8.1; he would like to hold off on referral to Urology for now;   He is not resting well at night, taking Trazodone 50 mg qhs; he feels very tired during the day; he has trouble falling and staying asleep;   Colonoscopy: , declined further  Eye exam:   Dental exam:   Pneumovax 23:   Prevnar 13: 2020  Prevnar 20:   Shingrix:   Tdap:   Fluvax: 10/14/2024  Pfizer Covid 19 booster: 10/14/2024  HTN:  Patient compliant with taking blood pressure medications: Yes  Discussed importance of following low sodium DASH diet, increasing physical activity, taking medications as ordered, decreasing alcohol intake, keeping f/u visits to recheck blood pressure,

## 2025-02-12 DIAGNOSIS — E78.2 MIXED HYPERLIPIDEMIA: ICD-10-CM

## 2025-02-12 RX ORDER — ATORVASTATIN CALCIUM 80 MG/1
80 TABLET, FILM COATED ORAL DAILY
Qty: 90 TABLET | Refills: 0 | OUTPATIENT
Start: 2025-02-12

## 2025-02-27 ENCOUNTER — TELEPHONE (OUTPATIENT)
Dept: INTERNAL MEDICINE CLINIC | Facility: CLINIC | Age: 80
End: 2025-02-27

## 2025-03-12 ENCOUNTER — TELEPHONE (OUTPATIENT)
Dept: INTERNAL MEDICINE CLINIC | Facility: CLINIC | Age: 80
End: 2025-03-12

## 2025-03-12 DIAGNOSIS — E78.2 MIXED HYPERLIPIDEMIA: ICD-10-CM

## 2025-03-12 NOTE — TELEPHONE ENCOUNTER
Medication Refill Request    Name of Medication : atorvastatin    Strength of Medication: 80 mg    Directions: once daily    30 day or 90 day supply: 90    [facility] [address]    Last Appt. Date: 1/28/25    Next Appt. Date: 7/29/25    Additional Information For Provider: 6 left

## 2025-03-13 RX ORDER — ATORVASTATIN CALCIUM 80 MG/1
80 TABLET, FILM COATED ORAL DAILY
Qty: 90 TABLET | Refills: 1 | Status: SHIPPED | OUTPATIENT
Start: 2025-03-13

## 2025-03-24 ENCOUNTER — TELEPHONE (OUTPATIENT)
Dept: INTERNAL MEDICINE CLINIC | Facility: CLINIC | Age: 80
End: 2025-03-24

## 2025-03-24 NOTE — TELEPHONE ENCOUNTER
Tcm needs to be completed once the patient dc on 3/26/25. [facility]  Thank you, Radha

## 2025-03-26 NOTE — TELEPHONE ENCOUNTER
Care Transitions Initial Follow Up Call    Outreach made within 2 business days of discharge: Yes    Patient: Kulwinder Gan Patient : 1945   MRN: [id number]  Reason for Admission: Septic shock  Discharge Date: 3/26/2025       Spoke with: LVM on 3/26/25 @ 5441 to have pt's spouse return my call.     Discharge department/facility: [facility]        Scheduled appointment with PCP within 7-14 days    Follow Up  Future Appointments   Date Time Provider Department Center   4/3/2025 10:00 AM Sonia Ballard APRN - CNP [facility] DEP   2025 10:40 AM MayJeremy, DO END [facility] AMB   2025  8:45 AM MAT LAB [facility] DEP   2025  8:40 AM Hannah Quintana MD [facility] DEP       Lola Wilkinson LPN

## 2025-03-26 NOTE — TELEPHONE ENCOUNTER
Care Transitions Initial Follow Up Call    Outreach made within 2 business days of discharge: Yes    Patient: Kulwinder Gan Patient : 1945   MRN: [id number]  Reason for Admission: Septic shock  Discharge Date: 3/26/2025       Spoke with: LVM on 3/26/25 @ 1206.    Discharge department/facility: [facility]        Scheduled appointment with PCP within 7-14 days    Follow Up  Future Appointments   Date Time Provider Department Center   4/3/2025 10:00 AM Sonia Ballard APRN - CNP MAT [facility] DEP   2025 10:40 AM Jeremy Gao, DO [facility] AMB   2025  8:45 AM MAT LAB MAT [facility] DEP   2025  8:40 AM Hannah Quintana MD [facility] DEP       Lola Wilkinson LPN

## 2025-04-03 ENCOUNTER — LAB (OUTPATIENT)
Dept: INTERNAL MEDICINE CLINIC | Facility: CLINIC | Age: 80
End: 2025-04-03

## 2025-04-03 ENCOUNTER — OFFICE VISIT (OUTPATIENT)
Dept: INTERNAL MEDICINE CLINIC | Facility: CLINIC | Age: 80
End: 2025-04-03

## 2025-04-03 VITALS
BODY MASS INDEX: 20.76 KG/M2 | HEART RATE: 111 BPM | SYSTOLIC BLOOD PRESSURE: 102 MMHG | TEMPERATURE: 98.4 F | WEIGHT: 124.6 LBS | RESPIRATION RATE: 16 BRPM | DIASTOLIC BLOOD PRESSURE: 68 MMHG | HEIGHT: 65 IN | OXYGEN SATURATION: 99 %

## 2025-04-03 DIAGNOSIS — N18.9 ACUTE RENAL FAILURE SUPERIMPOSED ON CHRONIC KIDNEY DISEASE, UNSPECIFIED ACUTE RENAL FAILURE TYPE, UNSPECIFIED CKD STAGE: ICD-10-CM

## 2025-04-03 DIAGNOSIS — R53.81 DEBILITY: ICD-10-CM

## 2025-04-03 DIAGNOSIS — R63.0 POOR APPETITE: ICD-10-CM

## 2025-04-03 DIAGNOSIS — I95.9 HYPOTENSION, UNSPECIFIED HYPOTENSION TYPE: ICD-10-CM

## 2025-04-03 DIAGNOSIS — N17.9 ACUTE RENAL FAILURE SUPERIMPOSED ON CHRONIC KIDNEY DISEASE, UNSPECIFIED ACUTE RENAL FAILURE TYPE, UNSPECIFIED CKD STAGE: ICD-10-CM

## 2025-04-03 DIAGNOSIS — S37.012A HEMATOMA OF LEFT KIDNEY, INITIAL ENCOUNTER: ICD-10-CM

## 2025-04-03 DIAGNOSIS — I51.7: ICD-10-CM

## 2025-04-03 DIAGNOSIS — M31.31 GRANULOMATOSIS WITH POLYANGIITIS WITH RENAL INVOLVEMENT (HCC): ICD-10-CM

## 2025-04-03 DIAGNOSIS — K21.9 GASTROESOPHAGEAL REFLUX DISEASE, UNSPECIFIED WHETHER ESOPHAGITIS PRESENT: ICD-10-CM

## 2025-04-03 DIAGNOSIS — E27.3 ADRENAL INSUFFICIENCY DUE TO CANCER THERAPY: ICD-10-CM

## 2025-04-03 DIAGNOSIS — Z09 HOSPITAL DISCHARGE FOLLOW-UP: Primary | ICD-10-CM

## 2025-04-03 LAB
ANION GAP SERPL CALC-SCNC: 11 MMOL/L (ref 7–16)
BASOPHILS # BLD: 0.06 K/UL (ref 0–0.2)
BASOPHILS NFR BLD: 0.7 % (ref 0–2)
BUN SERPL-MCNC: 42 MG/DL (ref 8–23)
CALCIUM SERPL-MCNC: 8.7 MG/DL (ref 8.8–10.2)
CHLORIDE SERPL-SCNC: 106 MMOL/L (ref 98–107)
CO2 SERPL-SCNC: 25 MMOL/L (ref 20–29)
CREAT SERPL-MCNC: 2.78 MG/DL (ref 0.8–1.3)
DIFFERENTIAL METHOD BLD: ABNORMAL
EOSINOPHIL # BLD: 0.04 K/UL (ref 0–0.8)
EOSINOPHIL NFR BLD: 0.4 % (ref 0.5–7.8)
ERYTHROCYTE [DISTWIDTH] IN BLOOD BY AUTOMATED COUNT: 17.6 % (ref 11.9–14.6)
GLUCOSE SERPL-MCNC: 88 MG/DL (ref 70–99)
HCT VFR BLD AUTO: 30.8 % (ref 41.1–50.3)
HGB BLD-MCNC: 9.7 G/DL (ref 13.6–17.2)
IMM GRANULOCYTES # BLD AUTO: 0.06 K/UL (ref 0–0.5)
IMM GRANULOCYTES NFR BLD AUTO: 0.7 % (ref 0–5)
LYMPHOCYTES # BLD: 0.89 K/UL (ref 0.5–4.6)
LYMPHOCYTES NFR BLD: 10 % (ref 13–44)
MCH RBC QN AUTO: 31.7 PG (ref 26.1–32.9)
MCHC RBC AUTO-ENTMCNC: 31.5 G/DL (ref 31.4–35)
MCV RBC AUTO: 100.7 FL (ref 82–102)
MONOCYTES # BLD: 0.49 K/UL (ref 0.1–1.3)
MONOCYTES NFR BLD: 5.5 % (ref 4–12)
NEUTS SEG # BLD: 7.38 K/UL (ref 1.7–8.2)
NEUTS SEG NFR BLD: 82.7 % (ref 43–78)
NRBC # BLD: 0 K/UL (ref 0–0.2)
PLATELET # BLD AUTO: 182 K/UL (ref 150–450)
PMV BLD AUTO: 10.7 FL (ref 9.4–12.3)
POTASSIUM SERPL-SCNC: 5.7 MMOL/L (ref 3.5–5.1)
RBC # BLD AUTO: 3.06 M/UL (ref 4.23–5.6)
SODIUM SERPL-SCNC: 142 MMOL/L (ref 136–145)
WBC # BLD AUTO: 8.9 K/UL (ref 4.3–11.1)

## 2025-04-03 RX ORDER — OMEPRAZOLE 20 MG/1
CAPSULE, DELAYED RELEASE ORAL DAILY
COMMUNITY
Start: 2025-03-04 | End: 2025-04-03 | Stop reason: SDUPTHER

## 2025-04-03 RX ORDER — AVACOPAN 10 MG/1
30 CAPSULE ORAL 2 TIMES DAILY
COMMUNITY
Start: 2025-03-05

## 2025-04-03 RX ORDER — OMEPRAZOLE 20 MG/1
20 CAPSULE, DELAYED RELEASE ORAL DAILY
Qty: 90 CAPSULE | Refills: 1 | Status: SHIPPED | OUTPATIENT
Start: 2025-04-03

## 2025-04-03 RX ORDER — PREDNISONE 5 MG/1
25 TABLET ORAL
COMMUNITY
Start: 2025-03-20 | End: 2025-04-19

## 2025-04-03 RX ORDER — SULFAMETHOXAZOLE AND TRIMETHOPRIM 800; 160 MG/1; MG/1
TABLET ORAL
COMMUNITY
Start: 2025-03-05 | End: 2025-06-03

## 2025-04-03 NOTE — PROGRESS NOTES
Insufficiency  Chronic issue entering hospital secondary to Keytruda treatment for NSCLC. Received stress dose steroids but was able to return to home dose as mentioned above.    Debility  Greatly improved compared to presentation, going to post acute care for continued improvement.    Anorexia  Appetite reduced on presentation but has since improved to what is assumed baseline.         Patient was discharged on 3/19/25 and transferred to a [facility] post-acute rehab facility where he stayed until 3/26/25. Back at home now. Working with home health PT/OT. Checking BP at home a couple of times weekly. Wife reports that BP has been consistently in the  systolic range. He is scheduled for labs with nephrology on 4/4/25 and will see the nephrologist on 4/10/25. Cardiology follow-up scheduled for 5/22/25.   Endocrinology follow-up for AI scheduled for 5/1/25. On prednisone 15 mg daily currently. Rheumatology 4/22/25.       Patient Active Problem List   Diagnosis    Smoker    History of CVA (cerebrovascular accident)    Essential hypertension    Left hemiparesis (HCC)    Hyperlipidemia    Granulomatosis with polyangiitis (HCC)    Sensory deficit, left    Elevated PSA    Closed bicondylar fracture of left tibial plateau    Open fracture of distal end of fibula and tibia, left, type I or II, initial encounter    Thrombocytopenia    Osteoporosis    Adenocarcinoma of lung (HCC)    Primary insomnia    Adrenal insufficiency due to cancer therapy    Wegener's granulomatosis with renal involvement       Medication list at time of discharge reviewed: Yes    Medications marked \"taking\" at this time  Outpatient Medications Marked as Taking for the 4/3/25 encounter (Office Visit) with Sonia Ballard APRN - CNP   Medication Sig Dispense Refill    Avacopan (TAVNEOS) 10 MG CAPS Take 30 mg by mouth 2 times daily      predniSONE (DELTASONE) 5 MG tablet Take 5 tablets by mouth daily (with breakfast)

## 2025-04-05 ASSESSMENT — ENCOUNTER SYMPTOMS
VOMITING: 0
COUGH: 0
DIARRHEA: 0
ABDOMINAL PAIN: 0
WHEEZING: 0
SHORTNESS OF BREATH: 0
SORE THROAT: 0
NAUSEA: 0

## 2025-04-08 ENCOUNTER — RESULTS FOLLOW-UP (OUTPATIENT)
Dept: INTERNAL MEDICINE CLINIC | Facility: CLINIC | Age: 80
End: 2025-04-08

## 2025-04-08 DIAGNOSIS — N18.9 ACUTE RENAL FAILURE SUPERIMPOSED ON CHRONIC KIDNEY DISEASE, UNSPECIFIED ACUTE RENAL FAILURE TYPE, UNSPECIFIED CKD STAGE: Primary | ICD-10-CM

## 2025-04-08 DIAGNOSIS — N17.9 ACUTE RENAL FAILURE SUPERIMPOSED ON CHRONIC KIDNEY DISEASE, UNSPECIFIED ACUTE RENAL FAILURE TYPE, UNSPECIFIED CKD STAGE: Primary | ICD-10-CM

## 2025-04-08 DIAGNOSIS — E87.5 HYPERKALEMIA: ICD-10-CM

## 2025-05-01 ENCOUNTER — OFFICE VISIT (OUTPATIENT)
Dept: ENDOCRINOLOGY | Age: 80
End: 2025-05-01
Payer: MEDICARE

## 2025-05-01 VITALS
OXYGEN SATURATION: 97 % | SYSTOLIC BLOOD PRESSURE: 132 MMHG | DIASTOLIC BLOOD PRESSURE: 76 MMHG | BODY MASS INDEX: 20.18 KG/M2 | HEART RATE: 102 BPM | HEIGHT: 67 IN | WEIGHT: 128.6 LBS

## 2025-05-01 DIAGNOSIS — E27.3 ADRENAL INSUFFICIENCY DUE TO CANCER THERAPY: Primary | ICD-10-CM

## 2025-05-01 PROCEDURE — G8420 CALC BMI NORM PARAMETERS: HCPCS | Performed by: STUDENT IN AN ORGANIZED HEALTH CARE EDUCATION/TRAINING PROGRAM

## 2025-05-01 PROCEDURE — 1159F MED LIST DOCD IN RCRD: CPT | Performed by: STUDENT IN AN ORGANIZED HEALTH CARE EDUCATION/TRAINING PROGRAM

## 2025-05-01 PROCEDURE — 1123F ACP DISCUSS/DSCN MKR DOCD: CPT | Performed by: STUDENT IN AN ORGANIZED HEALTH CARE EDUCATION/TRAINING PROGRAM

## 2025-05-01 PROCEDURE — 3075F SYST BP GE 130 - 139MM HG: CPT | Performed by: STUDENT IN AN ORGANIZED HEALTH CARE EDUCATION/TRAINING PROGRAM

## 2025-05-01 PROCEDURE — G8427 DOCREV CUR MEDS BY ELIG CLIN: HCPCS | Performed by: STUDENT IN AN ORGANIZED HEALTH CARE EDUCATION/TRAINING PROGRAM

## 2025-05-01 PROCEDURE — 4004F PT TOBACCO SCREEN RCVD TLK: CPT | Performed by: STUDENT IN AN ORGANIZED HEALTH CARE EDUCATION/TRAINING PROGRAM

## 2025-05-01 PROCEDURE — G2211 COMPLEX E/M VISIT ADD ON: HCPCS | Performed by: STUDENT IN AN ORGANIZED HEALTH CARE EDUCATION/TRAINING PROGRAM

## 2025-05-01 PROCEDURE — 3078F DIAST BP <80 MM HG: CPT | Performed by: STUDENT IN AN ORGANIZED HEALTH CARE EDUCATION/TRAINING PROGRAM

## 2025-05-01 PROCEDURE — 1160F RVW MEDS BY RX/DR IN RCRD: CPT | Performed by: STUDENT IN AN ORGANIZED HEALTH CARE EDUCATION/TRAINING PROGRAM

## 2025-05-01 PROCEDURE — 99215 OFFICE O/P EST HI 40 MIN: CPT | Performed by: STUDENT IN AN ORGANIZED HEALTH CARE EDUCATION/TRAINING PROGRAM

## 2025-05-01 RX ORDER — PREDNISONE 5 MG/1
TABLET ORAL
COMMUNITY
Start: 2025-04-10 | End: 2025-05-01

## 2025-05-01 RX ORDER — PREDNISONE 5 MG/1
TABLET ORAL
Qty: 90 TABLET | Refills: 3 | Status: SHIPPED | OUTPATIENT
Start: 2025-05-01

## 2025-05-01 ASSESSMENT — ENCOUNTER SYMPTOMS
COUGH: 0
DIARRHEA: 0
COLOR CHANGE: 0
NAUSEA: 1
ABDOMINAL DISTENTION: 0
CHEST TIGHTNESS: 0
SHORTNESS OF BREATH: 0
CONSTIPATION: 0
ABDOMINAL PAIN: 0
VOMITING: 0

## 2025-05-01 NOTE — PROGRESS NOTES
DO Kenroy Coleman [facility]  [address], [address]  [place] [address]        Kulwinder Gan is a 79 y.o. male who presents for follow up, evaluation and management of   Adrenal insufficiency due to cancer therapy  [facility] care 7/25/2024  LOV 1/27/2025    Assessment & Plan  Adrenal insufficiency 2/2 cancer treatment:  - Prednisone taper initiated by rheumatology for the next 6 weeks.  - Prescription for prednisone 5 mg hereafter; or resume hydrocortisone at 15-20 mg total daily, discussed with patient and wife- they like the convenience of dosing Prednisone once daily.  - Reviewed sick day rules: double dose during mild illness, triple if considering hospital visit.  - Administer additional 50 mg hydrocortisone prior to hospital admission.  - Steroid injection for severe symptoms or fall from bed.  - Hydrocortisone regimen if prednisone runs out: 10 mg AM, 5 mg PM. Extra steroid dose for fatigue, lethargy, or sluggishness is okay midday.    Follow-up: 4 to 5 months.    Follow-up and Dispositions    Return in about 5 months (around 10/1/2025) for AI 2/2 cancer.       Orders Placed This Encounter    DISCONTD: predniSONE (DELTASONE) 5 MG tablet     Sig: Prednisone Taper - Current 12.50 mg daily    predniSONE (DELTASONE) 5 MG tablet     Sig: Take 1 tablet AM upon waking.     Dispense:  90 tablet     Refill:  3       Subjective    History of Present Illness  The patient is a 79-year-old male with a complex medical history, including adrenal insufficiency secondary to cancer therapy, and a history of granulomatosis with polyangiitis with recent reoccurrence. His current health status is approximately 80% since hospitalization in Feb and March for MELODIE and sepsis, reports that he received stress dose steroids in hospital and IVF.    Adrenal Insufficiency  - Managed with hydrocortisone 10 mg in the morning and 5 mg in the evening  - Hospitalized in February 2025 for acute renal failure of unclear

## 2025-07-22 ENCOUNTER — LAB (OUTPATIENT)
Dept: INTERNAL MEDICINE CLINIC | Facility: CLINIC | Age: 80
End: 2025-07-22

## 2025-07-22 DIAGNOSIS — R31.29 OTHER MICROSCOPIC HEMATURIA: ICD-10-CM

## 2025-07-22 DIAGNOSIS — E78.2 MIXED HYPERLIPIDEMIA: ICD-10-CM

## 2025-07-22 DIAGNOSIS — R97.20 ELEVATED PSA: ICD-10-CM

## 2025-07-22 DIAGNOSIS — D69.6 THROMBOCYTOPENIA: ICD-10-CM

## 2025-07-22 LAB
APPEARANCE UR: ABNORMAL
BACTERIA URNS QL MICRO: ABNORMAL /HPF
BASOPHILS # BLD: 0.11 K/UL (ref 0–0.2)
BASOPHILS NFR BLD: 1.2 % (ref 0–2)
BILIRUB UR QL: NEGATIVE
CASTS URNS QL MICRO: ABNORMAL /LPF
COLOR UR: ABNORMAL
DIFFERENTIAL METHOD BLD: ABNORMAL
EOSINOPHIL # BLD: 0.37 K/UL (ref 0–0.8)
EOSINOPHIL NFR BLD: 4 % (ref 0.5–7.8)
EPI CELLS #/AREA URNS HPF: ABNORMAL /HPF
ERYTHROCYTE [DISTWIDTH] IN BLOOD BY AUTOMATED COUNT: 14.2 % (ref 11.9–14.6)
GLUCOSE UR STRIP.AUTO-MCNC: NEGATIVE MG/DL
HCT VFR BLD AUTO: 39.5 % (ref 41.1–50.3)
HGB BLD-MCNC: 13.1 G/DL (ref 13.6–17.2)
HGB UR QL STRIP: ABNORMAL
IMM GRANULOCYTES # BLD AUTO: 0.04 K/UL (ref 0–0.5)
IMM GRANULOCYTES NFR BLD AUTO: 0.4 % (ref 0–5)
KETONES UR QL STRIP.AUTO: ABNORMAL MG/DL
LEUKOCYTE ESTERASE UR QL STRIP.AUTO: ABNORMAL
LYMPHOCYTES # BLD: 2.6 K/UL (ref 0.5–4.6)
LYMPHOCYTES NFR BLD: 28.1 % (ref 13–44)
MCH RBC QN AUTO: 32.5 PG (ref 26.1–32.9)
MCHC RBC AUTO-ENTMCNC: 33.2 G/DL (ref 31.4–35)
MCV RBC AUTO: 98 FL (ref 82–102)
MONOCYTES # BLD: 0.8 K/UL (ref 0.1–1.3)
MONOCYTES NFR BLD: 8.6 % (ref 4–12)
NEUTS SEG # BLD: 5.33 K/UL (ref 1.7–8.2)
NEUTS SEG NFR BLD: 57.7 % (ref 43–78)
NITRITE UR QL STRIP.AUTO: NEGATIVE
NRBC # BLD: 0 K/UL (ref 0–0.2)
OTHER OBSERVATIONS: ABNORMAL
PH UR STRIP: 5.5 (ref 5–9)
PLATELET # BLD AUTO: 241 K/UL (ref 150–450)
PMV BLD AUTO: 11.6 FL (ref 9.4–12.3)
PROT UR STRIP-MCNC: 300 MG/DL
RBC # BLD AUTO: 4.03 M/UL (ref 4.23–5.6)
SP GR UR REFRACTOMETRY: 1.02 (ref 1–1.02)
UROBILINOGEN UR QL STRIP.AUTO: 1 EU/DL (ref 0.2–1)
WBC # BLD AUTO: 9.3 K/UL (ref 4.3–11.1)
WBC URNS QL MICRO: >100 /HPF

## 2025-07-23 LAB
ALBUMIN SERPL-MCNC: 3.4 G/DL (ref 3.2–4.6)
ALBUMIN/GLOB SERPL: 1.5 (ref 1–1.9)
ALP SERPL-CCNC: 185 U/L (ref 40–129)
ALT SERPL-CCNC: 18 U/L (ref 8–55)
ANION GAP SERPL CALC-SCNC: 13 MMOL/L (ref 7–16)
AST SERPL-CCNC: 21 U/L (ref 15–37)
BILIRUB SERPL-MCNC: 0.4 MG/DL (ref 0–1.2)
BUN SERPL-MCNC: 28 MG/DL (ref 8–23)
CALCIUM SERPL-MCNC: 9.8 MG/DL (ref 8.8–10.2)
CHLORIDE SERPL-SCNC: 102 MMOL/L (ref 98–107)
CHOLEST SERPL-MCNC: 152 MG/DL (ref 0–200)
CO2 SERPL-SCNC: 26 MMOL/L (ref 20–29)
CREAT SERPL-MCNC: 2.17 MG/DL (ref 0.8–1.3)
GLOBULIN SER CALC-MCNC: 2.2 G/DL (ref 2.3–3.5)
GLUCOSE SERPL-MCNC: 96 MG/DL (ref 70–99)
HDLC SERPL-MCNC: 76 MG/DL (ref 40–60)
HDLC SERPL: 2 (ref 0–5)
LDLC SERPL CALC-MCNC: 58 MG/DL (ref 0–100)
POTASSIUM SERPL-SCNC: 3.9 MMOL/L (ref 3.5–5.1)
PROT SERPL-MCNC: 5.5 G/DL (ref 6.3–8.2)
PSA FREE MFR SERPL: 21.3 %
PSA FREE SERPL-MCNC: 1.7 NG/ML
PSA SERPL-MCNC: 8 NG/ML (ref 0–4)
SODIUM SERPL-SCNC: 140 MMOL/L (ref 136–145)
TRIGL SERPL-MCNC: 89 MG/DL (ref 0–150)
TSH, 3RD GENERATION: 3.47 UIU/ML (ref 0.27–4.2)
VLDLC SERPL CALC-MCNC: 18 MG/DL (ref 6–23)

## 2025-07-29 ENCOUNTER — OFFICE VISIT (OUTPATIENT)
Dept: INTERNAL MEDICINE CLINIC | Facility: CLINIC | Age: 80
End: 2025-07-29
Payer: MEDICARE

## 2025-07-29 VITALS
TEMPERATURE: 97.9 F | WEIGHT: 127.8 LBS | OXYGEN SATURATION: 97 % | SYSTOLIC BLOOD PRESSURE: 122 MMHG | HEART RATE: 106 BPM | BODY MASS INDEX: 20.02 KG/M2 | DIASTOLIC BLOOD PRESSURE: 62 MMHG

## 2025-07-29 DIAGNOSIS — K21.9 GASTROESOPHAGEAL REFLUX DISEASE, UNSPECIFIED WHETHER ESOPHAGITIS PRESENT: ICD-10-CM

## 2025-07-29 DIAGNOSIS — M81.0 AGE-RELATED OSTEOPOROSIS WITHOUT CURRENT PATHOLOGICAL FRACTURE: ICD-10-CM

## 2025-07-29 DIAGNOSIS — E78.2 MIXED HYPERLIPIDEMIA: ICD-10-CM

## 2025-07-29 DIAGNOSIS — N30.00 ACUTE CYSTITIS WITHOUT HEMATURIA: ICD-10-CM

## 2025-07-29 DIAGNOSIS — E27.3 ADRENAL INSUFFICIENCY DUE TO CANCER THERAPY: ICD-10-CM

## 2025-07-29 DIAGNOSIS — F17.200 SMOKER: ICD-10-CM

## 2025-07-29 DIAGNOSIS — I10 ESSENTIAL HYPERTENSION: Primary | ICD-10-CM

## 2025-07-29 DIAGNOSIS — F32.1 CURRENT MODERATE EPISODE OF MAJOR DEPRESSIVE DISORDER WITHOUT PRIOR EPISODE (HCC): ICD-10-CM

## 2025-07-29 DIAGNOSIS — M31.31 WEGENER'S GRANULOMATOSIS WITH RENAL INVOLVEMENT (HCC): ICD-10-CM

## 2025-07-29 DIAGNOSIS — R97.20 ELEVATED PSA: ICD-10-CM

## 2025-07-29 DIAGNOSIS — C34.90 ADENOCARCINOMA OF LUNG, UNSPECIFIED LATERALITY (HCC): ICD-10-CM

## 2025-07-29 DIAGNOSIS — M31.31 GRANULOMATOSIS WITH POLYANGIITIS WITH RENAL INVOLVEMENT (HCC): ICD-10-CM

## 2025-07-29 DIAGNOSIS — G81.94 LEFT HEMIPARESIS (HCC): ICD-10-CM

## 2025-07-29 PROCEDURE — 3078F DIAST BP <80 MM HG: CPT | Performed by: INTERNAL MEDICINE

## 2025-07-29 PROCEDURE — G8420 CALC BMI NORM PARAMETERS: HCPCS | Performed by: INTERNAL MEDICINE

## 2025-07-29 PROCEDURE — 4004F PT TOBACCO SCREEN RCVD TLK: CPT | Performed by: INTERNAL MEDICINE

## 2025-07-29 PROCEDURE — 1123F ACP DISCUSS/DSCN MKR DOCD: CPT | Performed by: INTERNAL MEDICINE

## 2025-07-29 PROCEDURE — 99214 OFFICE O/P EST MOD 30 MIN: CPT | Performed by: INTERNAL MEDICINE

## 2025-07-29 PROCEDURE — 1159F MED LIST DOCD IN RCRD: CPT | Performed by: INTERNAL MEDICINE

## 2025-07-29 PROCEDURE — G8427 DOCREV CUR MEDS BY ELIG CLIN: HCPCS | Performed by: INTERNAL MEDICINE

## 2025-07-29 PROCEDURE — 1160F RVW MEDS BY RX/DR IN RCRD: CPT | Performed by: INTERNAL MEDICINE

## 2025-07-29 PROCEDURE — 3074F SYST BP LT 130 MM HG: CPT | Performed by: INTERNAL MEDICINE

## 2025-07-29 RX ORDER — ATORVASTATIN CALCIUM 80 MG/1
80 TABLET, FILM COATED ORAL DAILY
Qty: 90 TABLET | Refills: 3 | Status: SHIPPED | OUTPATIENT
Start: 2025-07-29

## 2025-07-29 RX ORDER — TAMSULOSIN HYDROCHLORIDE 0.4 MG/1
0.4 CAPSULE ORAL DAILY
Qty: 90 CAPSULE | Refills: 3 | Status: SHIPPED | OUTPATIENT
Start: 2025-07-29

## 2025-07-29 RX ORDER — OMEPRAZOLE 20 MG/1
20 CAPSULE, DELAYED RELEASE ORAL DAILY
Qty: 90 CAPSULE | Refills: 3 | Status: SHIPPED | OUTPATIENT
Start: 2025-07-29

## 2025-07-29 RX ORDER — PAROXETINE 10 MG/1
10 TABLET, FILM COATED ORAL EVERY MORNING
Qty: 90 TABLET | Refills: 3 | Status: SHIPPED | OUTPATIENT
Start: 2025-07-29

## 2025-07-29 RX ORDER — ZOLPIDEM TARTRATE 5 MG/1
5 TABLET ORAL NIGHTLY PRN
COMMUNITY
Start: 2025-07-21

## 2025-07-29 SDOH — ECONOMIC STABILITY: FOOD INSECURITY: WITHIN THE PAST 12 MONTHS, YOU WORRIED THAT YOUR FOOD WOULD RUN OUT BEFORE YOU GOT MONEY TO BUY MORE.: NEVER TRUE

## 2025-07-29 SDOH — ECONOMIC STABILITY: FOOD INSECURITY: WITHIN THE PAST 12 MONTHS, THE FOOD YOU BOUGHT JUST DIDN'T LAST AND YOU DIDN'T HAVE MONEY TO GET MORE.: NEVER TRUE

## 2025-07-29 ASSESSMENT — PATIENT HEALTH QUESTIONNAIRE - PHQ9
SUM OF ALL RESPONSES TO PHQ QUESTIONS 1-9: 0
SUM OF ALL RESPONSES TO PHQ QUESTIONS 1-9: 0
2. FEELING DOWN, DEPRESSED OR HOPELESS: NOT AT ALL
SUM OF ALL RESPONSES TO PHQ QUESTIONS 1-9: 0
1. LITTLE INTEREST OR PLEASURE IN DOING THINGS: NOT AT ALL
SUM OF ALL RESPONSES TO PHQ QUESTIONS 1-9: 0

## 2025-07-29 ASSESSMENT — ENCOUNTER SYMPTOMS
RHINORRHEA: 0
SHORTNESS OF BREATH: 0
ABDOMINAL PAIN: 0
BLOOD IN STOOL: 0
COUGH: 0

## 2025-07-29 NOTE — PROGRESS NOTES
[facility]      2025    Patient Name: Kulwinder Gan  :  1945    Subjective:    Chief Complaint:  Chief Complaint   Patient presents with    Follow-up         HPI Here for f/u visit; patient had fasting labs done recently and results were reviewed in detail today;   He has hx of Wegener's granulomatosis, adrenal insufficiency, pulmonary aspergillosis, adenocarcinoma of the lung, saw Dr. 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 Chapa for f/u in ; he also saw Dr. Dempsey at [facility] in May; CT negative for recurrent disease, is to f/u in 3 months w/ repeat CT chest and labs; records reviewed; he saw Dr. Shirley, Pulmonary, in May, is to f/u in 3 months; records reviewed;   He has LVH, saw Dr. Randall in May; currently asymptomatic, repeat echo planned w/ f/u in 1 year; records reviewed;   He has hx of elevated PSA, had cystoscopy in May, per Dr. Reyes; MRI and MRI guided prostate biopsy if abnormal lesion found discussed, and to be scheduled; records reviewed; he is hesitant to do the biopsy at this time, has f/u in September;   He has adrenal insufficiency secondary to cancer tx, saw Dr. Gao for f/u 25; Prednisone 5 mg qd prescribed, is to f/u in 5 months; records reviewed;   He was noted to have UTI on recent labs, taking abx as prescribed; he has had some urinary frequency, denies fever, chills;   He sleeps a lot, feeling depressed at times; he is resting well at night; he's had decreased appetite; he has not taken medication in the past;   He is still smoking, not ready to

## 2025-08-04 ENCOUNTER — TELEPHONE (OUTPATIENT)
Dept: ENDOCRINOLOGY | Age: 80
End: 2025-08-04

## 2025-08-29 ENCOUNTER — OFFICE VISIT (OUTPATIENT)
Dept: INTERNAL MEDICINE CLINIC | Facility: CLINIC | Age: 80
End: 2025-08-29
Payer: MEDICARE

## 2025-08-29 VITALS
SYSTOLIC BLOOD PRESSURE: 142 MMHG | DIASTOLIC BLOOD PRESSURE: 88 MMHG | RESPIRATION RATE: 14 BRPM | OXYGEN SATURATION: 97 % | WEIGHT: 132.6 LBS | HEIGHT: 67 IN | HEART RATE: 90 BPM | BODY MASS INDEX: 20.81 KG/M2 | TEMPERATURE: 98.6 F

## 2025-08-29 DIAGNOSIS — R35.0 URINARY FREQUENCY: ICD-10-CM

## 2025-08-29 DIAGNOSIS — F32.4 MAJOR DEPRESSIVE DISORDER WITH SINGLE EPISODE, IN PARTIAL REMISSION: Primary | ICD-10-CM

## 2025-08-29 DIAGNOSIS — R97.20 BPH WITH ELEVATED PSA AND LOWER URINARY TRACT SYMPTOMS: ICD-10-CM

## 2025-08-29 DIAGNOSIS — N39.0 ACUTE UTI: ICD-10-CM

## 2025-08-29 DIAGNOSIS — R31.29 MICROSCOPIC HEMATURIA: ICD-10-CM

## 2025-08-29 DIAGNOSIS — N40.1 BPH WITH ELEVATED PSA AND LOWER URINARY TRACT SYMPTOMS: ICD-10-CM

## 2025-08-29 LAB
BILIRUBIN, URINE, POC: NEGATIVE
BLOOD URINE, POC: ABNORMAL
GLUCOSE URINE, POC: NEGATIVE
KETONES, URINE, POC: NEGATIVE
LEUKOCYTE ESTERASE, URINE, POC: ABNORMAL
NITRITE, URINE, POC: NEGATIVE
PH, URINE, POC: 5.5 (ref 4.6–8)
PROTEIN,URINE, POC: ABNORMAL
SPECIFIC GRAVITY, URINE, POC: 1.02 (ref 1–1.03)
URINALYSIS CLARITY, POC: CLEAR
URINALYSIS COLOR, POC: YELLOW
UROBILINOGEN, POC: ABNORMAL MG/DL

## 2025-08-29 PROCEDURE — 99214 OFFICE O/P EST MOD 30 MIN: CPT | Performed by: PHYSICIAN ASSISTANT

## 2025-08-29 PROCEDURE — 3077F SYST BP >= 140 MM HG: CPT | Performed by: PHYSICIAN ASSISTANT

## 2025-08-29 PROCEDURE — G8420 CALC BMI NORM PARAMETERS: HCPCS | Performed by: PHYSICIAN ASSISTANT

## 2025-08-29 PROCEDURE — 3079F DIAST BP 80-89 MM HG: CPT | Performed by: PHYSICIAN ASSISTANT

## 2025-08-29 PROCEDURE — 4004F PT TOBACCO SCREEN RCVD TLK: CPT | Performed by: PHYSICIAN ASSISTANT

## 2025-08-29 PROCEDURE — G8427 DOCREV CUR MEDS BY ELIG CLIN: HCPCS | Performed by: PHYSICIAN ASSISTANT

## 2025-08-29 PROCEDURE — 1123F ACP DISCUSS/DSCN MKR DOCD: CPT | Performed by: PHYSICIAN ASSISTANT

## 2025-08-29 PROCEDURE — 1159F MED LIST DOCD IN RCRD: CPT | Performed by: PHYSICIAN ASSISTANT

## 2025-08-29 PROCEDURE — 1126F AMNT PAIN NOTED NONE PRSNT: CPT | Performed by: PHYSICIAN ASSISTANT

## 2025-08-29 PROCEDURE — 81001 URINALYSIS AUTO W/SCOPE: CPT | Performed by: PHYSICIAN ASSISTANT

## 2025-08-29 ASSESSMENT — ENCOUNTER SYMPTOMS: GASTROINTESTINAL NEGATIVE: 1

## 2025-08-29 ASSESSMENT — PATIENT HEALTH QUESTIONNAIRE - PHQ9
SUM OF ALL RESPONSES TO PHQ QUESTIONS 1-9: 1
SUM OF ALL RESPONSES TO PHQ QUESTIONS 1-9: 1
4. FEELING TIRED OR HAVING LITTLE ENERGY: SEVERAL DAYS
5. POOR APPETITE OR OVEREATING: NOT AT ALL
9. THOUGHTS THAT YOU WOULD BE BETTER OFF DEAD, OR OF HURTING YOURSELF: NOT AT ALL
6. FEELING BAD ABOUT YOURSELF - OR THAT YOU ARE A FAILURE OR HAVE LET YOURSELF OR YOUR FAMILY DOWN: NOT AT ALL
1. LITTLE INTEREST OR PLEASURE IN DOING THINGS: NOT AT ALL
10. IF YOU CHECKED OFF ANY PROBLEMS, HOW DIFFICULT HAVE THESE PROBLEMS MADE IT FOR YOU TO DO YOUR WORK, TAKE CARE OF THINGS AT HOME, OR GET ALONG WITH OTHER PEOPLE: NOT DIFFICULT AT ALL
3. TROUBLE FALLING OR STAYING ASLEEP: NOT AT ALL
SUM OF ALL RESPONSES TO PHQ QUESTIONS 1-9: 1
7. TROUBLE CONCENTRATING ON THINGS, SUCH AS READING THE NEWSPAPER OR WATCHING TELEVISION: NOT AT ALL
2. FEELING DOWN, DEPRESSED OR HOPELESS: NOT AT ALL
SUM OF ALL RESPONSES TO PHQ QUESTIONS 1-9: 1
8. MOVING OR SPEAKING SO SLOWLY THAT OTHER PEOPLE COULD HAVE NOTICED. OR THE OPPOSITE, BEING SO FIGETY OR RESTLESS THAT YOU HAVE BEEN MOVING AROUND A LOT MORE THAN USUAL: NOT AT ALL

## 2025-08-31 LAB
BACTERIA SPEC CULT: ABNORMAL
SERVICE CMNT-IMP: ABNORMAL

## 2025-09-02 ENCOUNTER — RESULTS FOLLOW-UP (OUTPATIENT)
Dept: INTERNAL MEDICINE CLINIC | Facility: CLINIC | Age: 80
End: 2025-09-02

## 2025-09-02 DIAGNOSIS — N39.0 ACUTE UTI: Primary | ICD-10-CM

## 2025-09-02 RX ORDER — NITROFURANTOIN 25; 75 MG/1; MG/1
100 CAPSULE ORAL 2 TIMES DAILY
Qty: 14 CAPSULE | Refills: 0 | Status: SHIPPED | OUTPATIENT
Start: 2025-09-02 | End: 2025-09-09